# Patient Record
Sex: MALE | Race: WHITE | NOT HISPANIC OR LATINO | Employment: OTHER | URBAN - METROPOLITAN AREA
[De-identification: names, ages, dates, MRNs, and addresses within clinical notes are randomized per-mention and may not be internally consistent; named-entity substitution may affect disease eponyms.]

---

## 2017-04-18 ENCOUNTER — OFFICE VISIT (OUTPATIENT)
Dept: FAMILY MEDICINE CLINIC | Facility: CLINIC | Age: 67
End: 2017-04-18

## 2017-04-18 VITALS
SYSTOLIC BLOOD PRESSURE: 166 MMHG | DIASTOLIC BLOOD PRESSURE: 100 MMHG | HEART RATE: 68 BPM | BODY MASS INDEX: 24.14 KG/M2 | WEIGHT: 178 LBS | RESPIRATION RATE: 16 BRPM | TEMPERATURE: 97.8 F

## 2017-04-18 DIAGNOSIS — R05.9 COUGH: Primary | ICD-10-CM

## 2017-04-18 PROCEDURE — 99213 OFFICE O/P EST LOW 20 MIN: CPT | Performed by: FAMILY MEDICINE

## 2017-04-18 RX ORDER — PROMETHAZINE HYDROCHLORIDE AND CODEINE PHOSPHATE 6.25; 1 MG/5ML; MG/5ML
5 SYRUP ORAL EVERY 6 HOURS PRN
Qty: 118 ML | Refills: 0 | Status: SHIPPED | OUTPATIENT
Start: 2017-04-18 | End: 2018-08-31
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
## 2017-06-06 ENCOUNTER — TELEPHONE (OUTPATIENT)
Dept: FAMILY MEDICINE CLINIC | Facility: CLINIC | Age: 67
End: 2017-06-06

## 2017-06-06 RX ORDER — SILDENAFIL 100 MG/1
100 TABLET, FILM COATED ORAL DAILY PRN
Qty: 5 TABLET | Refills: 1 | Status: SHIPPED | OUTPATIENT
Start: 2017-06-06 | End: 2018-08-31

## 2017-06-06 NOTE — TELEPHONE ENCOUNTER
----- Message from Stevo Ledesma Rep sent at 6/6/2017  9:44 AM EDT -----  Regarding: med request  Contact: [phone]  Pt called and said thinks he wants the Viagra rx now    Going on a cruise next week and says needs that before then     [facility] [place]     Per Dr. Leyva, rx sent in for viagra 100 mg qty of 5.  BBmohsen, CMA

## 2017-06-29 RX ORDER — LEVOTHYROXINE SODIUM 0.03 MG/1
25 TABLET ORAL DAILY
Qty: 30 TABLET | Refills: 3 | Status: SHIPPED | OUTPATIENT
Start: 2017-06-29 | End: 2017-08-12 | Stop reason: SDUPTHER

## 2017-08-14 RX ORDER — LEVOTHYROXINE SODIUM 0.03 MG/1
TABLET ORAL
Qty: 30 TABLET | Refills: 3 | Status: SHIPPED | OUTPATIENT
Start: 2017-08-14 | End: 2018-03-03 | Stop reason: SDUPTHER

## 2018-03-04 RX ORDER — LEVOTHYROXINE SODIUM 0.03 MG/1
TABLET ORAL
Qty: 30 TABLET | Refills: 3 | Status: SHIPPED | OUTPATIENT
Start: 2018-03-04 | End: 2018-06-13 | Stop reason: SDUPTHER

## 2018-06-13 RX ORDER — LEVOTHYROXINE SODIUM 0.03 MG/1
TABLET ORAL
Qty: 90 TABLET | Refills: 0 | Status: SHIPPED | OUTPATIENT
Start: 2018-06-13 | End: 2018-08-31 | Stop reason: SDUPTHER

## 2018-08-30 RX ORDER — LEVOTHYROXINE SODIUM 0.03 MG/1
TABLET ORAL
Qty: 90 TABLET | Refills: 0 | OUTPATIENT
Start: 2018-08-30

## 2018-08-31 ENCOUNTER — OFFICE VISIT (OUTPATIENT)
Dept: FAMILY MEDICINE CLINIC | Facility: CLINIC | Age: 68
End: 2018-08-31

## 2018-08-31 VITALS
TEMPERATURE: 97.7 F | DIASTOLIC BLOOD PRESSURE: 70 MMHG | OXYGEN SATURATION: 92 % | WEIGHT: 184 LBS | RESPIRATION RATE: 16 BRPM | BODY MASS INDEX: 24.95 KG/M2 | HEART RATE: 72 BPM | SYSTOLIC BLOOD PRESSURE: 110 MMHG

## 2018-08-31 DIAGNOSIS — E03.9 HYPOTHYROIDISM, UNSPECIFIED TYPE: Primary | ICD-10-CM

## 2018-08-31 PROCEDURE — 84439 ASSAY OF FREE THYROXINE: CPT | Performed by: FAMILY MEDICINE

## 2018-08-31 PROCEDURE — 36415 COLL VENOUS BLD VENIPUNCTURE: CPT | Performed by: FAMILY MEDICINE

## 2018-08-31 PROCEDURE — 99213 OFFICE O/P EST LOW 20 MIN: CPT | Performed by: FAMILY MEDICINE

## 2018-08-31 PROCEDURE — 84443 ASSAY THYROID STIM HORMONE: CPT | Performed by: FAMILY MEDICINE

## 2018-08-31 PROCEDURE — 80048 BASIC METABOLIC PNL TOTAL CA: CPT | Performed by: FAMILY MEDICINE

## 2018-08-31 RX ORDER — LEVOTHYROXINE SODIUM 0.03 MG/1
25 TABLET ORAL DAILY
Qty: 90 TABLET | Refills: 2 | Status: SHIPPED | OUTPATIENT
Start: 2018-08-31 | End: 2019-06-21 | Stop reason: SDUPTHER

## 2019-04-17 ENCOUNTER — HOSPITAL ENCOUNTER (OUTPATIENT)
Dept: GENERAL RADIOLOGY | Facility: HOSPITAL | Age: 69
Discharge: HOME | End: 2019-04-17
Admitting: FAMILY MEDICINE

## 2019-04-17 ENCOUNTER — OFFICE VISIT (OUTPATIENT)
Dept: FAMILY MEDICINE CLINIC | Facility: CLINIC | Age: 69
End: 2019-04-17

## 2019-04-17 VITALS
OXYGEN SATURATION: 97 % | RESPIRATION RATE: 20 BRPM | DIASTOLIC BLOOD PRESSURE: 60 MMHG | HEART RATE: 85 BPM | BODY MASS INDEX: 23.19 KG/M2 | SYSTOLIC BLOOD PRESSURE: 90 MMHG | TEMPERATURE: 97.8 F | WEIGHT: 171 LBS

## 2019-04-17 DIAGNOSIS — J69.0 ASPIRATION PNEUMONIA DUE TO GASTRIC SECRETIONS, UNSPECIFIED LATERALITY, UNSPECIFIED PART OF LUNG (HCC): ICD-10-CM

## 2019-04-17 DIAGNOSIS — M25.552 LEFT HIP PAIN: ICD-10-CM

## 2019-04-17 DIAGNOSIS — M25.552 LEFT HIP PAIN: Primary | ICD-10-CM

## 2019-04-17 PROBLEM — J18.9 PNEUMONIA DUE TO INFECTIOUS ORGANISM: Status: ACTIVE | Noted: 2019-04-17

## 2019-04-17 PROCEDURE — 99214 OFFICE O/P EST MOD 30 MIN: CPT | Performed by: FAMILY MEDICINE

## 2019-04-17 PROCEDURE — 73502 X-RAY EXAM HIP UNI 2-3 VIEWS: CPT

## 2019-04-17 PROCEDURE — 71046 X-RAY EXAM CHEST 2 VIEWS: CPT

## 2019-04-17 RX ORDER — DICLOFENAC SODIUM 75 MG/1
75 TABLET, DELAYED RELEASE ORAL 2 TIMES DAILY
Qty: 14 TABLET | Refills: 0 | Status: SHIPPED | OUTPATIENT
Start: 2019-04-17 | End: 2019-04-25

## 2019-04-17 NOTE — PROGRESS NOTES
Subjective   Lucian Bermudez is a 69 y.o. male seen today for Hip Pain.     Hip Pain    The incident occurred more than 1 week ago (2 weeks ago). Incident location: playing golf. The injury mechanism was a twisting injury. The pain is present in the left hip. The quality of the pain is described as aching. The pain is at a severity of 8/10. Associated symptoms include an inability to bear weight. The symptoms are aggravated by weight bearing. He has tried non-weight bearing, rest, immobilization and NSAIDs for the symptoms. The treatment provided moderate relief.        The following portions of the patient's history were reviewed and updated as appropriate: allergies, current medications, past social history and problem list.    Review of Systems    Objective   BP 90/60   Pulse 85   Temp 97.8 °F (36.6 °C)   Resp 20   Wt 77.6 kg (171 lb)   SpO2 97%   BMI 23.19 kg/m²   Physical Exam   Constitutional: He is oriented to person, place, and time.   The patient is a thin middle-aged  male in no acute distress.  His voice is indistinct.  He has a history of prior surgery for throat cancer.   Pulmonary/Chest:   There are some mild bibasilar rhonchi in both lung fields.  There is no wheezing or actual rales.   Abdominal:   There is a ostomy for PEG tube.   Musculoskeletal:   The patient is in no acute distress.  He does have an antalgic gait.  There is some tenderness with deep compression at the posterior aspect of the left hip and in the area of the initial tuberosity.  There is no tenderness over the lateral aspect or anterior aspect of the left hip.  He does have good range of motion.  He has pain with weightbearing.   Neurological: He is alert and oriented to person, place, and time.       Assessment/Plan   Problem List Items Addressed This Visit        Respiratory    Pneumonia due to infectious organism      Other Visit Diagnoses     Left hip pain    -  Primary      The patient was recently hospitalized  for aspiration pneumonia in [place].  He stayed overnight.  That was about a month ago.  He says his symptoms have resolved including cough and fever.  He finished his course of antibiotics.  He did have bibasilar infiltrates on his chest x-ray in [place].    The patient has left hip pain following an injury that was sustained while playing golf 2 weeks ago.  He did hear a pop at that time.  He has pain with ambulation.  I believe we need to rule out a bony fracture.  We will check an AP and lateral x-ray of the left hip as well as of the pelvis.  We will try him on diclofenac 75 mg twice a day for a week.  If he fails to improve then will need to refer either to orthopedics or physical therapy.        Drink plenty fluids.    Check an xray of the left hip with pelvis. Check a chest xray. Report results by letter.    Rx for Diclofenac 75 mg twice a day #14+0.  Stay off the Aleve while on the Diclofenac.    Follow up in August for a 45 minute visit fasting for general lab studies.                  Scribed for Dr Neil Hui by Nayely Vila [facility].  \        I, Neil Hui MD, personally performed the services described in this documentation, as scribed by Nayely Vila in my presence, and is both accurate and complete.        (Please note that portions of this note were completed with a voice recognition program. Efforts were made to edit the dictations,but occasionally words are mis transcribed.)

## 2019-04-19 DIAGNOSIS — M25.552 LEFT HIP PAIN: Primary | ICD-10-CM

## 2019-04-25 ENCOUNTER — OFFICE VISIT (OUTPATIENT)
Dept: FAMILY MEDICINE CLINIC | Facility: CLINIC | Age: 69
End: 2019-04-25

## 2019-04-25 VITALS
HEART RATE: 76 BPM | OXYGEN SATURATION: 96 % | TEMPERATURE: 97.7 F | BODY MASS INDEX: 23.73 KG/M2 | DIASTOLIC BLOOD PRESSURE: 60 MMHG | SYSTOLIC BLOOD PRESSURE: 94 MMHG | WEIGHT: 175 LBS | RESPIRATION RATE: 16 BRPM

## 2019-04-25 DIAGNOSIS — M16.12 PRIMARY OSTEOARTHRITIS OF LEFT HIP: Primary | ICD-10-CM

## 2019-04-25 PROCEDURE — 20552 NJX 1/MLT TRIGGER POINT 1/2: CPT | Performed by: FAMILY MEDICINE

## 2019-04-25 PROCEDURE — 99213 OFFICE O/P EST LOW 20 MIN: CPT | Performed by: FAMILY MEDICINE

## 2019-04-25 RX ORDER — TRAMADOL HYDROCHLORIDE 50 MG/1
50 TABLET ORAL EVERY 6 HOURS PRN
Qty: 25 TABLET | Refills: 0
Start: 2019-04-25 | End: 2019-05-08

## 2019-04-25 RX ORDER — METHYLPREDNISOLONE ACETATE 80 MG/ML
80 INJECTION, SUSPENSION INTRA-ARTICULAR; INTRALESIONAL; INTRAMUSCULAR; SOFT TISSUE ONCE
Status: COMPLETED | OUTPATIENT
Start: 2019-04-25 | End: 2019-04-25

## 2019-04-25 RX ADMIN — METHYLPREDNISOLONE ACETATE 80 MG: 80 INJECTION, SUSPENSION INTRA-ARTICULAR; INTRALESIONAL; INTRAMUSCULAR; SOFT TISSUE at 15:43

## 2019-04-25 NOTE — PROGRESS NOTES
Subjective   Lucian Bermudez is a 69 y.o. male seen today for Hip Pain.     Hip Pain    The incident occurred more than 1 week ago (about 3 -4 weeks.). Incident location: playing golf. The injury mechanism was a twisting injury. The pain is present in the left hip. The pain is at a severity of 8/10. The pain is moderate. The pain has been worsening since onset. The symptoms are aggravated by movement. He has tried NSAIDs for the symptoms. The treatment provided no relief.        The following portions of the patient's history were reviewed and updated as appropriate: allergies, current medications, past social history and problem list.    Review of Systems   Respiratory: Negative for shortness of breath.    Cardiovascular: Negative for chest pain.       Objective   BP 94/60   Pulse 76   Temp 97.7 °F (36.5 °C)   Resp 16   Wt 79.4 kg (175 lb)   SpO2 96%   BMI 23.73 kg/m²   Physical Exam   Constitutional: He appears well-developed and well-nourished.   Nursing note and vitals reviewed.      Assessment/Plan   Problem List Items Addressed This Visit     None      Visit Diagnoses     Primary osteoarthritis of left hip    -  Primary    Relevant Medications    methylPREDNISolone acetate (DEPO-medrol) injection 80 mg (Completed)    traMADol (ULTRAM) 50 MG tablet    Other Relevant Orders    Inject Trigger Point, 1 or 2        Inject Trigger Point, 1 or 2  Date/Time: 4/25/2019 2:58 PM  Performed by: Neil Hui MD  Authorized by: Neil Hui MD   Consent: Verbal consent obtained.  Consent given by: patient  Patient understanding: patient states understanding of the procedure being performed  Patient consent: the patient's understanding of the procedure matches consent given  Patient identity confirmed: verbally with patient  Local anesthesia used: yes    Anesthesia:  Local anesthesia used: yes  Local Anesthetic: bupivacaine 0.25% without epinephrine  Anesthetic total: 0.2 mL    Sedation:  Patient sedated:  no    Patient tolerance: Patient tolerated the procedure well with no immediate complications  Comments: Posterior aspect of left hip injected depo medrol 80 mg and 0.25% Marcaine without epinephrine.            Drink plenty fluids.    Continue medications as doing.    Written Rx for Tramadol 50 mg every 6 hours if needed #25+0.  Navin# [id number] reviewed.    Refer to physical therapy.    Follow up as needed.                Scribed for Dr Neil Hui by Nayely Vila CMA.          I, Neil Hui MD, personally performed the services described in this documentation, as scribed by Nayely Vila in my presence, and is both accurate and complete.        (Please note that portions of this note were completed with a voice recognition program. Efforts were made to edit the dictations,but occasionally words are mis transcribed.)

## 2019-05-01 ENCOUNTER — HOSPITAL ENCOUNTER (OUTPATIENT)
Dept: PHYSICAL THERAPY | Facility: HOSPITAL | Age: 69
Setting detail: THERAPIES SERIES
Discharge: HOME | End: 2019-05-01

## 2019-05-01 ENCOUNTER — TELEPHONE (OUTPATIENT)
Dept: FAMILY MEDICINE CLINIC | Facility: CLINIC | Age: 69
End: 2019-05-01

## 2019-05-01 DIAGNOSIS — M25.552 LEFT HIP PAIN: Primary | ICD-10-CM

## 2019-05-01 PROCEDURE — 97161 PT EVAL LOW COMPLEX 20 MIN: CPT | Performed by: PHYSICAL THERAPIST

## 2019-05-01 NOTE — TELEPHONE ENCOUNTER
----- Message from Stevo Evans Rep sent at 5/1/2019  9:12 AM EDT -----  Regarding: [facility] REFERRAL  Contact: [phone]  PT SAID THE PHYSICAL THERAPY IS NOT HELPING MUCH FOR HIS HIP AND WOULD LIKE TO KNOW IF HE CAN BE REFERRED TO ORTHO?      Referral has been placed and appt has been made. Nichole, CMA

## 2019-05-08 ENCOUNTER — OFFICE VISIT (OUTPATIENT)
Dept: ORTHOPEDIC SURGERY | Facility: CLINIC | Age: 69
End: 2019-05-08

## 2019-05-08 VITALS — HEART RATE: 77 BPM | HEIGHT: 72 IN | BODY MASS INDEX: 23.71 KG/M2 | OXYGEN SATURATION: 96 % | WEIGHT: 175.04 LBS

## 2019-05-08 DIAGNOSIS — S76.012A HIP STRAIN, LEFT, INITIAL ENCOUNTER: Primary | ICD-10-CM

## 2019-05-08 PROCEDURE — 99203 OFFICE O/P NEW LOW 30 MIN: CPT | Performed by: ORTHOPAEDIC SURGERY

## 2019-05-08 NOTE — PROGRESS NOTES
[facility] [place] [facility]    Subjective     Chief Complaint   Patient presents with   • Left Hip - Pain     Patient says he was playing golf while in [place], around five weeks ago. Patient felt a pain then, but it was gotten worse. Saw PCP who did Xray. Cortisone injection given around two weeks ago.        HPI      Lucian Bermudez is a 69 y.o. male.  He injured his left hip playing golf in 4 to 5 weeks ago.  He felt a pop in his posterior hip buttock area.  Pain is 3-8 out of 10 at times.  He went to physical therapy one day.  It is throbbing stabbing and unchanged.        Past Medical History:   Diagnosis Date   • Cancer (CMS/HCC)    • H/O colonoscopy       Past Surgical History:   Procedure Laterality Date   • CATARACT EXTRACTION     • COLONOSCOPY        History reviewed. No pertinent family history.  Social History     Socioeconomic History   • Marital status:      Spouse name: Not on file   • Number of children: Not on file   • Years of education: Not on file   • Highest education level: Not on file   Tobacco Use   • Smoking status: Never Smoker   • Smokeless tobacco: Never Used      Current Outpatient Medications on File Prior to Visit   Medication Sig Dispense Refill   • levothyroxine (SYNTHROID, LEVOTHROID) 25 MCG tablet Take 1 tablet by mouth Daily. 90 tablet 2   • [DISCONTINUED] traMADol (ULTRAM) 50 MG tablet Take 1 tablet by mouth Every 6 (Six) Hours As Needed for Moderate Pain . 25 tablet 0     No current facility-administered medications on file prior to visit.       No Known Allergies     The following portions of the patient's history were reviewed and updated as appropriate: allergies, current medications, past family history, past medical history, past social history, past surgical history and problem list.    Review of Systems   Constitutional: Positive for activity change.   HENT: Negative.    Eyes: Negative.    Respiratory: Negative.    Cardiovascular: Negative.   "  Gastrointestinal: Negative.    Endocrine: Negative.    Genitourinary: Negative.    Musculoskeletal: Positive for arthralgias and gait problem.   Skin: Negative.    Allergic/Immunologic: Negative.    Hematological: Negative.    Psychiatric/Behavioral: Negative.         Objective      Physical Exam  Pulse 77   Ht 182.9 cm (72\")   Wt 79.4 kg (175 lb 0.7 oz)   SpO2 96%   BMI 23.74 kg/m²     Body mass index is 23.74 kg/m².        GENERAL APPEARANCE: awake, alert & oriented x 3, in no acute distress and well developed, well nourished  PSYCH: normal mood and affect  LUNGS:  breathing nonlabored, no wheezing  EYES: sclera anicteric, pupils equal  CARDIOVASCULAR: palpable pulses dorsalis pedis, palpable posterior tibial bilaterally. Capillary refill less than 2 seconds  INTEGUMENTARY: skin intact, no clubbing, cyanosis  NEUROLOGIC:  Normal Sensation and reflexes             Ortho Exam  Peripheral Vascular  Lower Extremity   Edema - None bilaterally    Musculoskeletal  Lower Extremity   Left Hip    Normal sensation and coordination    No crepitus, instability, subluxation or laxity    No known fractures or dislocations   Right Hip    Normal sensation and coordination    No crepitus, instability, subluxation or laxity    No known fractures or dislocations     Inspection and palpation    Tenderness -      Right - none     Left - none    Tissue tension/texture is pliable and soft bilaterally     Normal warmth bilaterally   Strength and Tone    Left hip flexors - 5/5     Right hip flexors - 5/5   Deformities/Postures/Misalignments/Discrepancies    No leg length discrepancy   Functional Testing    Stinchfield test negative bilaterally    90-90 straight leg raise negative bilaterally    Imaging/Studies  Imaging Results (last 7 days)     ** No results found for the last 168 hours. **      I viewed his x-rays from April 17 which are negative    Assessment/Plan        ICD-10-CM ICD-9-CM   1. Hip strain, left, initial encounter " S76.012A 843.9       Orders Placed This Encounter   Procedures   • Ambulatory Referral to Physical Therapy      He will go to physical therapy and follow-up in 4 weeks.  He should get better for the soft tissue injury within a few months.    Medical Decision Making  Management Options : over-the-counter medicine and physical/occupational therapy  Data/Risk: radiology tests and independent visualization of imaging, lab tests, or EMG/NCV    Neil Hyman MD  05/08/19  10:07 AM         EMR Dragon/Transcription disclaimer:  Much of this encounter note is an electronic transcription of spoken language to printed text. Electronic transcription of spoken language may permit erroneous, or at times, nonsensical words or phrases to be inadvertently transcribed. Although I have reviewed the note for such errors, some may still exist.

## 2019-05-09 ENCOUNTER — HOSPITAL ENCOUNTER (OUTPATIENT)
Dept: PHYSICAL THERAPY | Facility: HOSPITAL | Age: 69
Setting detail: THERAPIES SERIES
Discharge: HOME | End: 2019-05-09

## 2019-05-09 DIAGNOSIS — M25.552 LEFT HIP PAIN: Primary | ICD-10-CM

## 2019-05-09 PROCEDURE — 97110 THERAPEUTIC EXERCISES: CPT | Performed by: PHYSICAL THERAPIST

## 2019-05-09 NOTE — THERAPY TREATMENT NOTE
Outpatient Physical Therapy Ortho Treatment Note  [facility]     Patient Name: Lucian Bermudez  : 1950  MRN: [id number]  Today's Date: 2019      Visit Date: 2019    Visit Dx:    ICD-10-CM ICD-9-CM   1. Left hip pain M25.552 719.45       Patient Active Problem List   Diagnosis   • Hypothyroidism   • Pneumonia due to infectious organism        Past Medical History:   Diagnosis Date   • Cancer (CMS/HCC)    • H/O colonoscopy         Past Surgical History:   Procedure Laterality Date   • CATARACT EXTRACTION     • COLONOSCOPY         PT Ortho     Row Name 19 1100       Subjective Comments    Subjective Comments  Pt. saw ortho MD and is assured that he does not need surgery on his hip.  He has done his HEP, some exercises more than others.  He reports no pain when he is not on his feet.  He continues to have hip pain with walking and distal LE symptoms with prolonged walking.  -ANDRÉS      User Key  (r) = Recorded By, (t) = Taken By, (c) = Cosigned By    Initials Name Provider Type    Louann Batista, FRANSISCO Physical Therapist                      PT Assessment/Plan     Row Name 19 1100          PT Assessment    Assessment Comments  Possible facet restriction with intermittent nerve compression/irritation.  Positions of flexion and contralateral sidebending provide relief while extension and ipsilateral sidebend increase symptoms.  This would suggest lumbar origin of symptoms.  -ANDRÉS        PT Plan    PT Plan Comments  Continue PT.  Progress and modify as symptoms indicate.  -ADNRÉS       User Key  (r) = Recorded By, (t) = Taken By, (c) = Cosigned By    Initials Name Provider Type    Louann Batista PT Physical Therapist            Exercises     Row Name 19 1100             Subjective Comments    Subjective Comments  Pt. saw ortho MD and is assured that he does not need surgery on his hip.  He has done his HEP, some exercises more than others.  He reports no pain when he is not on  his feet.  He continues to have hip pain with walking and distal LE symptoms with prolonged walking.  -ANDRÉS         Total Minutes    05048 - PT Therapeutic Exercise Minutes  35  -ANDRÉS         Exercise 1    Exercise Name 1  Reviewed existing HEP and practiced new exercises as a part of ongoing assessment of symptoms.  Added lumbar right rotation/L posterior lateral hip stretch, sidelying IT band stretch off edge of bed, and abdominal exercises to HEP after practice in clinic today.  -ANDRÉS        User Key  (r) = Recorded By, (t) = Taken By, (c) = Cosigned By    Initials Name Provider Type    Louann Batista, PT Physical Therapist                                          Time Calculation:   Start Time: 1115  Therapy Charges for Today     Code Description Service Date Service Provider Modifiers Qty    [id number]  PT THER PROC EA 15 MIN 5/9/2019 Louann Machado, PT GP 2                    Louann Machado, PT  5/9/2019

## 2019-05-22 ENCOUNTER — HOSPITAL ENCOUNTER (OUTPATIENT)
Dept: PHYSICAL THERAPY | Facility: HOSPITAL | Age: 69
Setting detail: THERAPIES SERIES
Discharge: HOME | End: 2019-05-22

## 2019-05-22 DIAGNOSIS — M25.552 LEFT HIP PAIN: Primary | ICD-10-CM

## 2019-05-22 PROCEDURE — 97110 THERAPEUTIC EXERCISES: CPT | Performed by: PHYSICAL THERAPIST

## 2019-05-22 NOTE — THERAPY TREATMENT NOTE
Outpatient Physical Therapy Ortho Treatment Note  [facility]     Patient Name: Lucian Bermudez  : 1950  MRN: [id number]  Today's Date: 2019      Visit Date: 2019    Visit Dx:    ICD-10-CM ICD-9-CM   1. Left hip pain M25.552 719.45       Patient Active Problem List   Diagnosis   • Hypothyroidism   • Pneumonia due to infectious organism        Past Medical History:   Diagnosis Date   • Cancer (CMS/HCC)    • H/O colonoscopy         Past Surgical History:   Procedure Laterality Date   • CATARACT EXTRACTION     • COLONOSCOPY         PT Ortho     Row Name 19 1600       Subjective Comments    Subjective Comments  Pt. continues to report LLE pain with standing and walking.  HE has begun seeing a chiropractor.  He thinks the treatments help his symptoms at least temporarily.  He continues to do his home exercises and they do not hurt, but he is not sure that they help.  He wants to know what he should do.  -ANDRÉS      User Key  (r) = Recorded By, (t) = Taken By, (c) = Cosigned By    Initials Name Provider Type    Louann Batista, PT Physical Therapist                      PT Assessment/Plan     Row Name 19 1600          PT Assessment    Assessment Comments  No significant change in symptom presentation after 2 weeks of HEP performance.  Pt. seems to want to try chiropractic care.    -ANDRÉS        PT Plan    PT Plan Comments  PT follow up scheduled in 4 weeks.  Pt. will explore chiropractic treatment per his request and will return for reassessment if symptoms have not resolved or improved in 4 weeks.  -ANDRÉS       User Key  (r) = Recorded By, (t) = Taken By, (c) = Cosigned By    Initials Name Provider Type    Louann Batista PT Physical Therapist            Exercises     Row Name 19 1600             Subjective Comments    Subjective Comments  Pt. continues to report LLE pain with standing and walking.  HE has begun seeing a chiropractor.  He thinks the treatments help his  symptoms at least temporarily.  He continues to do his home exercises and they do not hurt, but he is not sure that they help.  He wants to know what he should do.  -ANDRÉS         Total Minutes    29003 - PT Therapeutic Exercise Minutes  25  -ANDRÉS         Exercise 1    Exercise Name 1  Mobility assessment in a variety of positions continue to indicate symptoms reduction in flexion and symptom exacerbation in extension or with standing or walking.  -ANDRÉS        User Key  (r) = Recorded By, (t) = Taken By, (c) = Cosigned By    Initials Name Provider Type    Louann Batista, PT Physical Therapist                                          Time Calculation:   Start Time: 1430  Total Timed Code Minutes- PT: 25 minute(s)  Therapy Charges for Today     Code Description Service Date Service Provider Modifiers Qty    81435975382  PT THER PROC EA 15 MIN 5/22/2019 Louann Machado, PT GP 2                    Louann Machado, PT  5/22/2019

## 2019-05-30 ENCOUNTER — APPOINTMENT (OUTPATIENT)
Dept: PHYSICAL THERAPY | Facility: HOSPITAL | Age: 69
End: 2019-05-30

## 2019-06-04 ENCOUNTER — APPOINTMENT (OUTPATIENT)
Dept: PHYSICAL THERAPY | Facility: HOSPITAL | Age: 69
End: 2019-06-04

## 2019-06-05 ENCOUNTER — OFFICE VISIT (OUTPATIENT)
Dept: ORTHOPEDIC SURGERY | Facility: CLINIC | Age: 69
End: 2019-06-05

## 2019-06-05 VITALS — HEART RATE: 83 BPM | OXYGEN SATURATION: 98 % | WEIGHT: 175.04 LBS | BODY MASS INDEX: 23.71 KG/M2 | HEIGHT: 72 IN

## 2019-06-05 DIAGNOSIS — S76.012A HIP STRAIN, LEFT, INITIAL ENCOUNTER: ICD-10-CM

## 2019-06-05 DIAGNOSIS — M51.36 LUMBAR DEGENERATIVE DISC DISEASE: ICD-10-CM

## 2019-06-05 DIAGNOSIS — M54.5 LOW BACK PAIN, UNSPECIFIED BACK PAIN LATERALITY, UNSPECIFIED CHRONICITY, WITH SCIATICA PRESENCE UNSPECIFIED: Primary | ICD-10-CM

## 2019-06-05 PROCEDURE — 99213 OFFICE O/P EST LOW 20 MIN: CPT | Performed by: ORTHOPAEDIC SURGERY

## 2019-06-05 NOTE — PROGRESS NOTES
[facility] [place] [facility]    Subjective     Chief Complaint   Patient presents with   • Follow-up     4 weeks - Left Hip Strain        HPI      Lucian Bermudez is a 69 y.o. male.  His pain has continued for 2 and half months.  It radiates from his hip down towards his foot.  Is worse with walking standing climbing stairs and better with sitting.  He is tried physical therapy and a chiropractor.  Pain is 8 out of 10 at its worse.  He has minimal back pain.  He has a history of thyroid cancer.        Past Medical History:   Diagnosis Date   • Cancer (CMS/HCC)    • H/O colonoscopy       Past Surgical History:   Procedure Laterality Date   • CATARACT EXTRACTION     • COLONOSCOPY        History reviewed. No pertinent family history.  Social History     Socioeconomic History   • Marital status:      Spouse name: Not on file   • Number of children: Not on file   • Years of education: Not on file   • Highest education level: Not on file   Tobacco Use   • Smoking status: Never Smoker   • Smokeless tobacco: Never Used      Current Outpatient Medications on File Prior to Visit   Medication Sig Dispense Refill   • levothyroxine (SYNTHROID, LEVOTHROID) 25 MCG tablet Take 1 tablet by mouth Daily. 90 tablet 2     No current facility-administered medications on file prior to visit.       No Known Allergies     The following portions of the patient's history were reviewed and updated as appropriate: allergies, current medications, past family history, past medical history, past social history, past surgical history and problem list.    Review of Systems   Constitutional: Negative.    HENT: Negative.    Eyes: Negative.    Respiratory: Negative.    Cardiovascular: Negative.    Gastrointestinal: Negative.    Endocrine: Negative.    Genitourinary: Negative.    Musculoskeletal: Positive for arthralgias.   Skin: Negative.    Allergic/Immunologic: Negative.    Hematological: Negative.    Psychiatric/Behavioral:  "Negative.         Objective      Physical Exam  Pulse 83   Ht 182.9 cm (72.01\")   Wt 79.4 kg (175 lb 0.7 oz)   SpO2 98%   BMI 23.74 kg/m²     Body mass index is 23.74 kg/m².        GENERAL APPEARANCE: awake, alert & oriented x 3, in no acute distress and well developed, well nourished  PSYCH: normal mood and affect      Ortho Exam  Peripheral Vascular   Upper Extremities    No cyanotic nail beds    Pink nail beds and rapid capillary refill   Palpation    Radial Pulse - Bilaterally normal    Neurologic   Sensory - normal   Muscle Strength and Tone    Right iliopsoas - 5/5    Left iliopsoas - 5/5    Right quadriceps - 5/5     Left quadriceps - 5/5    Right hamstrings - 5/5     Left hamstrings - 5/5    Right tibialis anterior - 5/5    Left tibialis anterior - 5/5    Right gastroc-soleus - 5/5    Left gastroc-soleus - 5/5    Right EHL - 5/5    Left EHL 5/5     Reflexes    Right knee- 2+     Left knee - 2+     Right ankle - 2+    Left ankle - 2+    Musculoskeletal   Lumbosacral Spine    Inspection and Palpation - no swelling   ROJM    Flexion - 70 degrees   Deformities/Malalignments/Discrepancies  - lumbar lordosis   Functional Testing    Straight Leg Raising Test negative     Hips   Right Hip - no tenderness to palpation, no pain    Left Hip - no tenderness to palpation, no pain           Imaging/Studies  Imaging Results (last 7 days)     Procedure Component Value Units Date/Time    XR Spine Lumbar 2 or 3 View [[id number]] Resulted:  06/05/19 0949     Updated:  06/05/19 0950    Narrative:       Lumbar Spine X-Ray  Indication: Pain  AP and Lateral views    Findings: Degenerative changes  No fracture  No bony lesion  Normal soft tissues    No prior studies were available for comparison.            Assessment/Plan        ICD-10-CM ICD-9-CM   1. Low back pain, unspecified back pain laterality, unspecified chronicity, with sciatica presence unspecified M54.5 724.2   2. Hip strain, left, initial encounter S76.012A 843.9 "   3. Lumbar degenerative disc disease M51.36 722.52       Orders Placed This Encounter   Procedures   • XR Spine Lumbar 2 or 3 View   • MRI Lumbar Spine Without Contrast      I have ordered an MRI of his lumbar spine.  Not only has he had sciatica radiculopathy he has had a history of cancer.  I am concerned about that and the possibility of metastases.  I will see him back after the MRI of the lumbar spine.  He may hold off on therapy in the meantime.    Medical Decision Making  Management Options : over-the-counter medicine  Data/Risk: radiology tests and independent visualization of imaging, lab tests, or EMG/NCV    Neil Hyman MD  06/05/19  9:52 AM         EMR Dragon/Transcription disclaimer:  Much of this encounter note is an electronic transcription of spoken language to printed text. Electronic transcription of spoken language may permit erroneous, or at times, nonsensical words or phrases to be inadvertently transcribed. Although I have reviewed the note for such errors, some may still exist.

## 2019-06-18 ENCOUNTER — HOSPITAL ENCOUNTER (OUTPATIENT)
Dept: MRI IMAGING | Facility: HOSPITAL | Age: 69
Discharge: HOME | End: 2019-06-18
Admitting: ORTHOPAEDIC SURGERY

## 2019-06-18 DIAGNOSIS — M51.36 LUMBAR DEGENERATIVE DISC DISEASE: ICD-10-CM

## 2019-06-18 PROCEDURE — 72148 MRI LUMBAR SPINE W/O DYE: CPT

## 2019-06-19 ENCOUNTER — OFFICE VISIT (OUTPATIENT)
Dept: ORTHOPEDIC SURGERY | Facility: CLINIC | Age: 69
End: 2019-06-19

## 2019-06-19 ENCOUNTER — HOSPITAL ENCOUNTER (OUTPATIENT)
Dept: PHYSICAL THERAPY | Facility: HOSPITAL | Age: 69
Setting detail: THERAPIES SERIES
Discharge: HOME | End: 2019-06-19

## 2019-06-19 VITALS — HEART RATE: 88 BPM | WEIGHT: 175.04 LBS | BODY MASS INDEX: 23.71 KG/M2 | OXYGEN SATURATION: 98 % | HEIGHT: 72 IN

## 2019-06-19 DIAGNOSIS — M51.36 DDD (DEGENERATIVE DISC DISEASE), LUMBAR: Primary | ICD-10-CM

## 2019-06-19 DIAGNOSIS — M54.16 RADICULOPATHY OF LUMBAR REGION: ICD-10-CM

## 2019-06-19 DIAGNOSIS — M25.552 LEFT HIP PAIN: Primary | ICD-10-CM

## 2019-06-19 PROCEDURE — 99213 OFFICE O/P EST LOW 20 MIN: CPT | Performed by: ORTHOPAEDIC SURGERY

## 2019-06-19 PROCEDURE — 97110 THERAPEUTIC EXERCISES: CPT | Performed by: PHYSICAL THERAPIST

## 2019-06-19 NOTE — PROGRESS NOTES
[facility]opaedic Surgery Clinic Note    Subjective     Chief Complaint   Patient presents with   • Follow-up     after MRI L spine 06/18/2019        HPI  Lucian Bermudez is a 69 y.o. male.  His follow-up MRI lumbar spine.  Now his whole left leg is hurting.  He is here with his wife.  Pain is 3-8 out of 10 radiating down his left leg.  It is dull.  Is getting worse.  He has no true back pain today.  He has normal bowel bladder function.    Past Medical History:   Diagnosis Date   • Cancer (CMS/HCC)    • H/O colonoscopy       Past Surgical History:   Procedure Laterality Date   • CATARACT EXTRACTION     • COLONOSCOPY        History reviewed. No pertinent family history.  Social History     Socioeconomic History   • Marital status:      Spouse name: Not on file   • Number of children: Not on file   • Years of education: Not on file   • Highest education level: Not on file   Tobacco Use   • Smoking status: Never Smoker   • Smokeless tobacco: Never Used      Current Outpatient Medications on File Prior to Visit   Medication Sig Dispense Refill   • levothyroxine (SYNTHROID, LEVOTHROID) 25 MCG tablet Take 1 tablet by mouth Daily. 90 tablet 2     No current facility-administered medications on file prior to visit.       No Known Allergies     The following portions of the patient's history were reviewed and updated as appropriate: allergies, current medications, past family history, past medical history, past social history, past surgical history and problem list.    Review of Systems   Constitutional: Negative.    HENT: Negative.    Eyes: Negative.    Respiratory: Negative.    Cardiovascular: Negative.    Gastrointestinal: Negative.    Endocrine: Negative.    Genitourinary: Negative.    Musculoskeletal: Positive for arthralgias.   Skin: Negative.    Allergic/Immunologic: Negative.    Neurological: Negative.    Hematological: Negative.    Psychiatric/Behavioral: Negative.         Objective      Physical  "Exam  Pulse 88   Ht 182.9 cm (72.01\")   Wt 79.4 kg (175 lb 0.7 oz)   SpO2 98%   BMI 23.74 kg/m²     Body mass index is 23.74 kg/m².    GENERAL APPEARANCE: awake, alert & oriented x 3, in no acute distress and well developed, well nourished  PSYCH: normal mood and affect      Ortho Exam  Peripheral Vascular   Upper Extremities    No cyanotic nail beds    Pink nail beds and rapid capillary refill   Palpation    Radial Pulse - Bilaterally normal    Neurologic   Sensory - normal   Muscle Strength and Tone    Right iliopsoas - 5/5    Left iliopsoas - 5/5    Right quadriceps - 5/5     Left quadriceps - 5/5    Right hamstrings - 5/5     Left hamstrings - 5/5    Right tibialis anterior - 5/5    Left tibialis anterior - 5/5    Right gastroc-soleus - 5/5    Left gastroc-soleus - 5/5    Right EHL - 5/5    Left EHL 5/5     Reflexes    Right knee- 2+     Left knee - 2+     Right ankle - 2+    Left ankle - 2+    Musculoskeletal   Lumbosacral Spine    Inspection and Palpation - no swelling   ROJM    Flexion - 70 degrees   Deformities/Malalignments/Discrepancies  - lumbar lordosis   Functional Testing    Straight Leg Raising Test negative     Hips   Right Hip - no tenderness to palpation, no pain    Left Hip - no tenderness to palpation, no pain           Imaging/Studies  Imaging Results (last 7 days)     ** No results found for the last 168 hours. **        I viewed his MRI of the lumbar spine from June 18 which shows an L4-5 disc protrusion and degenerative changes throughout the lumbar spine  Assessment/Plan        ICD-10-CM ICD-9-CM   1. DDD (degenerative disc disease), lumbar M51.36 722.52   2. Radiculopathy of lumbar region M54.16 724.4       Orders Placed This Encounter   Procedures   • Ambulatory Referral to Spine Surgery      I referred him to spine.  Patient and his family would like to see Dr. Kevin Swann.  I will see him back as needed.  He will continue physical therapy.    Medical Decision Making  Management " Options : over-the-counter medicine and physical/occupational therapy  Data/Risk: radiology tests and independent visualization of imaging, lab tests, or EMG/NCV    Neil Hyman MD  06/19/19  10:11 AM         EMR Dragon/Transcription disclaimer:  Much of this encounter note is an electronic transcription of spoken language to printed text. Electronic transcription of spoken language may permit erroneous, or at times, nonsensical words or phrases to be inadvertently transcribed. Although I have reviewed the note for such errors, some may still exist.

## 2019-06-19 NOTE — THERAPY PROGRESS REPORT/RE-CERT
Outpatient Physical Therapy Ortho Progress Note   [facility]     Patient Name: Lucian Bermudez  : 1950  MRN: [id number]  Today's Date: 2019      Visit Date: 2019    Visit Dx:    ICD-10-CM ICD-9-CM   1. Left hip pain M25.552 719.45       Patient Active Problem List   Diagnosis   • Hypothyroidism   • Pneumonia due to infectious organism        Past Medical History:   Diagnosis Date   • Cancer (CMS/HCC)    • H/O colonoscopy         Past Surgical History:   Procedure Laterality Date   • CATARACT EXTRACTION     • COLONOSCOPY         PT Ortho     Row Name 19 1600       Subjective Comments    Subjective Comments  Pt. followed up with ortho MD after MRI.  He has been referred to neurosurgeon.  He is to continue PT.  -ANDRÉS       DTR- Lower Quarter Clearing    Patellar tendon (L2-4)  Bilateral:;2- Normal response  -ANDRÉS    Achilles tendon (S1-2)  Bilateral:;2- Normal response  -ANDRÉS       Sensory Screen for Light Touch- Lower Quarter Clearing    L1 (inguinal area)  Intact  -ANDRÉS    L2 (anterior mid thigh)  Intact  -ANDRÉS    L3 (distal anterior thigh)  Intact  -ANDRÉS    L4 (medial lower leg/foot)  Intact  -ANDRÉS    L5 (lateral lower leg/great toe)  Intact  -ANDRÉS    S1 (bottom of foot)  Intact  -ANDRÉS       Myotomal Screen- Lower Quarter Clearing    Hip flexion (L2)  Bilateral:;5 (Normal)  -ANDRÉS    Knee extension (L3)  Bilateral:;5 (Normal)  -ANDRÉS    Ankle DF (L4)  Bilateral:;5 (Normal)  -ANDRÉS    Great toe extension (L5)  Bilateral:;5 (Normal)  -ANDRÉS    Ankle PF (S1)  Bilateral:;5 (Normal)  -ANDRÉS    Knee flexion (S2)  Bilateral:;5 (Normal)  -ANDRÉS       Lumbar ROM Screen- Lower Quarter Clearing    Lumbar Flexion  Normal  -ANDRÉS    Lumbar Extension  Impaired limited ROM and increases LE pain  -ANDRÉS    Lumbar Lateral Flexion  Impaired To L increases L pain, to R reduces L LE pain.  -ANDRÉS      User Key  (r) = Recorded By, (t) = Taken By, (c) = Cosigned By    Initials Name Provider Type    Louann Batista PT Physical Therapist                       PT Assessment/Plan     Row Name 06/19/19 1643 06/19/19 1631       PT Assessment    Functional Limitations  --  Impaired gait;Impaired locomotion;Performance in work activities;Performance in sport activities;Performance in leisure activities  -    Impairments  --  Poor body mechanics;Posture;Muscle strength;Range of motion;Pain;Joint mobility  -    Assessment Comments  --  Pt. was last seen nearly 1 month ago and reports no improvement in symptoms.  Today is his fourth visit since May 1, 2019.  He is here to resume care after recent MRI and MD follow up.  MRI indicates lateral disc protrusion and facet DJD producing L foraminal narrowing.  Best pain relief in clinic today was in prone over pillow .He may benefit from PT intervention if he is able to consistently comply with recommendations for symptoms management.    -ANDRÉS    Please refer to paper survey for additional self-reported information  Yes  -ANDRÉS  --    Rehab Potential  Fair  -ANDRÉS  --    Patient/caregiver participated in establishment of treatment plan and goals  Yes  -ANDRÉS  --    Patient would benefit from skilled therapy intervention  Yes  -ANDRÉS  --       PT Plan    PT Frequency  2x/week  -ANDRÉS  --    Predicted Duration of Therapy Intervention (Therapy Eval)  8 visits  -ANDRÉS  --    Planned CPT's?  PT EVAL LOW COMPLEXITY: 81254;PT THER PROC EA 15 MIN: 61800;PT MANUAL THERAPY EA 15 MIN: 81578;PT NEUROMUSC RE-EDUCATION EA 15 MIN: 17481;PT HOT OR COLD PACK TREAT MCARE  -ANDRÉS  --    PT Plan Comments  --  Resume PT when pt. returns from vacation.  -ANDRÉS      User Key  (r) = Recorded By, (t) = Taken By, (c) = Cosigned By    Initials Name Provider Type    Louann Batista, PT Physical Therapist            Exercises     Row Name 06/19/19 1600             Subjective Comments    Subjective Comments  Pt. followed up with ortho MD after MRI.  He has been referred to neurosurgeon.  He is to continue PT.  -         Total Minutes    26950 - PT Therapeutic  Exercise Minutes  45  -ANDRÉS         Exercise 1    Exercise Name 1  Reassessment completed today.  Pt. indicates no improvement with current HEP.  He continues to complain of LLE pain in hip nad anterior thigh  and front of leg to ankle.  He is leaving for [place] later this week and will be gone until after July 4.  Best pain relief achieved today laying prone over pillow.  -ANDRÉS        User Key  (r) = Recorded By, (t) = Taken By, (c) = Cosigned By    Initials Name Provider Type    Louann Batista PT Physical Therapist                       PT OP Goals     Row Name 06/19/19 1600          PT Short Term Goals    STG Date to Achieve  07/17/19  -ANDRÉS     STG 1  client will be independent with initial HEP program  -ANDRÉS     STG 1 Progress  Ongoing  -ANDRÉS     STG 2  cleint will complete ADLs without pain  -ANDRÉS     STG 2 Progress  Ongoing  -ANDRÉS     STG 3  client will demonstrate left hip strength 4+/5  -ANDRÉS     STG 3 Progress  Ongoing  -ANDRÉS     STG 4  Pt. reports reduction in LLE pain intensity and distribution.  -ANDRÉS        Long Term Goals    LTG Date to Achieve  08/14/19  -ANDRÉS     LTG 1  client will be independent with comprehensive program  -ANDRÉS     LTG 1 Progress  Ongoing  -ANDRÉS     LTG 2  client will demonstrate normalized gait mechanics without pain   -ANDRÉS     LTG 2 Progress  Ongoing  -ANDRÉS     LTG 3  client will report LEFS increased to > 45/80  -ANDRÉS     LTG 3 Progress  Progressing;Ongoing  -ANDRÉS        Time Calculation    PT Goal Re-Cert Due Date  09/17/19  -ANDRÉS       User Key  (r) = Recorded By, (t) = Taken By, (c) = Cosigned By    Initials Name Provider Type    Louann Batista, PT Physical Therapist          Therapy Education  Given: HEP, Symptoms/condition management  Program: New  How Provided: Verbal, Demonstration, Written  Provided to: Patient  Level of Understanding: Verbalized, Demonstrated    Outcome Measure Options: Lower Extremity Functional Scale (LEFS), Anurag Perez  Lower Extremity Functional Index  Any of  your usual work, housework or school activities: Moderate difficulty  Your usual hobbies, recreational or sporting activities: Extreme difficulty or unable to perform activity  Getting into or out of the bath: Moderate difficulty  Walking between rooms: A little bit of difficulty  Putting on your shoes or socks: No difficulty  Squatting: A little bit of difficulty  Lifting an object, like a bag of groceries from the floor: A little bit of difficulty  Performing light activities around your home: A little bit of difficulty  Performing heavy activities around your home: Extreme difficulty or unable to perform activity  Getting into or out of a car: A little bit of difficulty  Walking 2 blocks: Quite a bit of difficulty  Walking a mile: Extreme difficulty or unable to perform activity  Going up or down 10 stairs (about 1 flight of stairs): Moderate difficulty  Standing for 1 hour: Extreme difficulty or unable to perform activity  Sitting for 1 hour: No difficulty  Running on even ground: Extreme difficulty or unable to perform activity  Running on uneven ground: Extreme difficulty or unable to perform activity  Making sharp turns while running fast: Extreme difficulty or unable to perform activity  Hopping: Quite a bit of difficulty  Rolling over in bed: A little bit of difficulty  Total: 34  Modified Oswestry  Modified Oswestry Score/Comments: 22/50=44%      Time Calculation:   Start Time: 1430  Total Timed Code Minutes- PT: 45 minute(s)  Therapy Charges for Today     Code Description Service Date Service Provider Modifiers Qty    [id number]  PT THER PROC EA 15 MIN 6/19/2019 Louann Machado, PT GP 3          PT G-Codes  Outcome Measure Options: Lower Extremity Functional Scale (LEFS), Anurag Perez  Total: 34  Modified Oswestry Score/Comments: 22/50=44%         Louann Machado, PT  6/19/2019

## 2019-06-21 DIAGNOSIS — E03.9 HYPOTHYROIDISM, UNSPECIFIED TYPE: ICD-10-CM

## 2019-06-21 RX ORDER — LEVOTHYROXINE SODIUM 0.03 MG/1
25 TABLET ORAL DAILY
Qty: 90 TABLET | Refills: 0 | Status: SHIPPED | OUTPATIENT
Start: 2019-06-21 | End: 2019-09-23 | Stop reason: SDUPTHER

## 2019-06-26 ENCOUNTER — APPOINTMENT (OUTPATIENT)
Dept: PHYSICAL THERAPY | Facility: HOSPITAL | Age: 69
End: 2019-06-26

## 2019-07-15 ENCOUNTER — DOCUMENTATION (OUTPATIENT)
Dept: PHYSICAL THERAPY | Facility: HOSPITAL | Age: 69
End: 2019-07-15

## 2019-07-15 DIAGNOSIS — M25.552 LEFT HIP PAIN: Primary | ICD-10-CM

## 2019-07-15 NOTE — THERAPY DISCHARGE NOTE
Outpatient Physical Therapy Discharge Summary         Patient Name: Lucian Bermudez  : 1950  MRN: [id number]    Today's Date: 7/15/2019    Visit Dx:    ICD-10-CM ICD-9-CM   1. Left hip pain M25.552 719.45       PT OP Goals     Row Name 07/15/19 0900          PT Short Term Goals    STG Date to Achieve  19  -ANDRÉS     STG 1  client will be independent with initial HEP program  -ANDRÉS     STG 1 Progress  Ongoing  -ANDRÉS     STG 2  cleint will complete ADLs without pain  -ANDRÉS     STG 2 Progress  Ongoing  -ANDRÉS     STG 3  client will demonstrate left hip strength 4+/5  -ANDRÉS     STG 3 Progress  Ongoing  -ANDRÉS     STG 4  Pt. reports reduction in LLE pain intensity and distribution.  -ANDRÉS        Long Term Goals    LTG Date to Achieve  19  -ANDRÉS     LTG 1  client will be independent with comprehensive program  -ANDRÉS     LTG 1 Progress  Ongoing  -ANDRÉS     LTG 2  client will demonstrate normalized gait mechanics without pain   -ADNRÉS     LTG 2 Progress  Ongoing  -ANDRÉS     LTG 3  client will report LEFS increased to > 45/80  -ANDRÉS     LTG 3 Progress  Progressing;Ongoing  -ANDRÉS       User Key  (r) = Recorded By, (t) = Taken By, (c) = Cosigned By    Initials Name Provider Type    Louann Batista, PT Physical Therapist          OP PT Discharge Summary  Date of Discharge: 07/15/19  Reason for Discharge: Lack of progress  Outcomes Achieved: Unable to make functional progress toward goals at this time  Discharge Destination: Home with home program      Time Calculation:                    Louann Machado, FRANSISCO  7/15/2019

## 2019-09-23 DIAGNOSIS — E03.9 HYPOTHYROIDISM, UNSPECIFIED TYPE: ICD-10-CM

## 2019-09-24 RX ORDER — LEVOTHYROXINE SODIUM 0.03 MG/1
TABLET ORAL
Qty: 90 TABLET | Refills: 0 | Status: SHIPPED | OUTPATIENT
Start: 2019-09-24 | End: 2019-12-19

## 2019-12-19 DIAGNOSIS — E03.9 HYPOTHYROIDISM, UNSPECIFIED TYPE: ICD-10-CM

## 2019-12-19 RX ORDER — LEVOTHYROXINE SODIUM 0.03 MG/1
TABLET ORAL
Qty: 90 TABLET | Refills: 0 | Status: SHIPPED | OUTPATIENT
Start: 2019-12-19 | End: 2020-01-17 | Stop reason: SDUPTHER

## 2019-12-19 NOTE — TELEPHONE ENCOUNTER
Patient left a message stating that he will be leaving [place] and won't be back until the middle of April.    [facility], [place]

## 2019-12-22 RX ORDER — AMOXICILLIN AND CLAVULANATE POTASSIUM 400; 57 MG/5ML; MG/5ML
POWDER, FOR SUSPENSION ORAL
Qty: 220 ML | Refills: 0 | Status: SHIPPED | OUTPATIENT
Start: 2019-12-22 | End: 2020-07-23

## 2020-01-17 ENCOUNTER — TELEPHONE (OUTPATIENT)
Dept: FAMILY MEDICINE CLINIC | Facility: CLINIC | Age: 70
End: 2020-01-17

## 2020-01-17 DIAGNOSIS — E03.9 HYPOTHYROIDISM, UNSPECIFIED TYPE: ICD-10-CM

## 2020-01-17 RX ORDER — LEVOTHYROXINE SODIUM 0.03 MG/1
25 TABLET ORAL DAILY
Qty: 90 TABLET | Refills: 0 | Status: CANCELLED | OUTPATIENT
Start: 2020-01-17

## 2020-01-17 RX ORDER — LEVOTHYROXINE SODIUM 0.03 MG/1
25 TABLET ORAL DAILY
Qty: 90 TABLET | Refills: 1 | Status: SHIPPED | OUTPATIENT
Start: 2020-01-17 | End: 2020-07-14

## 2020-01-17 NOTE — TELEPHONE ENCOUNTER
Patient called to request a refill for the levothyroxine (SYNTHROID, LEVOTHROID) 25 MCG tablet. The patient said that his pharmacy informed him that there are no more refills left for his current prescription and they could only refill it if they received authorization to do so from Dr. Hui. The patient said that he only has 5 or 6 tablets left of this medication.    The patient said that he is currently in [place], [place] and will be there until the end of April, so he cannot come in office to be seen until May. The patient made an appointment to come in office when he gets back to [place] - the appointment is scheduled for 5/4/20 at 10:00 AM with Dr. Hui.    If his medication can be refilled before he returns to [place], the patient requested for the prescription to be sent to the pharmacy at [facility] in [place], [place] on [address].    If there are any questions or concerns please call the patient back at [phone] or the pharmacy at [facility] at [phone].

## 2020-02-07 DIAGNOSIS — R05.9 COUGH: Primary | ICD-10-CM

## 2020-07-14 DIAGNOSIS — E03.9 HYPOTHYROIDISM, UNSPECIFIED TYPE: ICD-10-CM

## 2020-07-14 RX ORDER — LEVOTHYROXINE SODIUM 0.03 MG/1
TABLET ORAL
Qty: 30 TABLET | Refills: 0 | Status: SHIPPED | OUTPATIENT
Start: 2020-07-14 | End: 2020-07-23 | Stop reason: SDUPTHER

## 2020-07-14 NOTE — TELEPHONE ENCOUNTER
Caller: Lucian Bermudez    Relationship: Self    Best call back number: [phone]     Medication needed:   Requested Prescriptions     Pending Prescriptions Disp Refills   • levothyroxine (SYNTHROID, LEVOTHROID) 25 MCG tablet [Pharmacy Med Name: Levothyroxine Sodium 25 MCG Oral Tablet] 90 tablet 0     Sig: TAKE 1 TABLET BY MOUTH ONCE DAILY . APPOINTMENT REQUIRED FOR FUTURE REFILLS       When do you need the refill by: SOON    What details did the patient provide when requesting the medication: PT HAS AN APPT ON 7/23.    Does the patient have less than a 3 day supply:  [] Yes  [x] No    What is the patient's preferred pharmacy: [facility] [address] [place] [address] [phone] [place] [phone] FX

## 2020-07-23 ENCOUNTER — OFFICE VISIT (OUTPATIENT)
Dept: FAMILY MEDICINE CLINIC | Facility: CLINIC | Age: 70
End: 2020-07-23

## 2020-07-23 VITALS
HEART RATE: 76 BPM | WEIGHT: 164 LBS | RESPIRATION RATE: 18 BRPM | TEMPERATURE: 97.3 F | HEIGHT: 71 IN | OXYGEN SATURATION: 99 % | BODY MASS INDEX: 22.96 KG/M2 | DIASTOLIC BLOOD PRESSURE: 70 MMHG | SYSTOLIC BLOOD PRESSURE: 100 MMHG

## 2020-07-23 DIAGNOSIS — Z13.220 ENCOUNTER FOR LIPID SCREENING FOR CARDIOVASCULAR DISEASE: ICD-10-CM

## 2020-07-23 DIAGNOSIS — Z12.5 SCREENING FOR PROSTATE CANCER: Primary | ICD-10-CM

## 2020-07-23 DIAGNOSIS — E03.9 HYPOTHYROIDISM, UNSPECIFIED TYPE: ICD-10-CM

## 2020-07-23 DIAGNOSIS — Z13.6 ENCOUNTER FOR LIPID SCREENING FOR CARDIOVASCULAR DISEASE: ICD-10-CM

## 2020-07-23 LAB
ALBUMIN SERPL-MCNC: 4 G/DL (ref 3.5–5.2)
ALBUMIN/GLOB SERPL: 1.3 G/DL
ALP SERPL-CCNC: 65 U/L (ref 39–117)
ALT SERPL W P-5'-P-CCNC: 22 U/L (ref 1–41)
ANION GAP SERPL CALCULATED.3IONS-SCNC: 9.2 MMOL/L (ref 5–15)
AST SERPL-CCNC: 24 U/L (ref 1–40)
BASOPHILS # BLD AUTO: 0.02 10*3/MM3 (ref 0–0.2)
BASOPHILS NFR BLD AUTO: 0.3 % (ref 0–1.5)
BILIRUB SERPL-MCNC: 1.2 MG/DL (ref 0–1.2)
BUN SERPL-MCNC: 22 MG/DL (ref 8–23)
BUN/CREAT SERPL: 25 (ref 7–25)
CALCIUM SPEC-SCNC: 9.7 MG/DL (ref 8.6–10.5)
CHLORIDE SERPL-SCNC: 99 MMOL/L (ref 98–107)
CHOLEST SERPL-MCNC: 114 MG/DL (ref 0–200)
CO2 SERPL-SCNC: 30.8 MMOL/L (ref 22–29)
CREAT SERPL-MCNC: 0.88 MG/DL (ref 0.76–1.27)
DEPRECATED RDW RBC AUTO: 43.7 FL (ref 37–54)
EOSINOPHIL # BLD AUTO: 0.13 10*3/MM3 (ref 0–0.4)
EOSINOPHIL NFR BLD AUTO: 2.3 % (ref 0.3–6.2)
ERYTHROCYTE [DISTWIDTH] IN BLOOD BY AUTOMATED COUNT: 12.4 % (ref 12.3–15.4)
GFR SERPL CREATININE-BSD FRML MDRD: 86 ML/MIN/1.73
GLOBULIN UR ELPH-MCNC: 3.2 GM/DL
GLUCOSE SERPL-MCNC: 86 MG/DL (ref 65–99)
HCT VFR BLD AUTO: 44.1 % (ref 37.5–51)
HDLC SERPL-MCNC: 49 MG/DL (ref 40–60)
HGB BLD-MCNC: 14.2 G/DL (ref 13–17.7)
IMM GRANULOCYTES # BLD AUTO: 0.01 10*3/MM3 (ref 0–0.05)
IMM GRANULOCYTES NFR BLD AUTO: 0.2 % (ref 0–0.5)
LDLC SERPL CALC-MCNC: 44 MG/DL (ref 0–100)
LDLC/HDLC SERPL: 0.9 {RATIO}
LYMPHOCYTES # BLD AUTO: 0.96 10*3/MM3 (ref 0.7–3.1)
LYMPHOCYTES NFR BLD AUTO: 16.8 % (ref 19.6–45.3)
MCH RBC QN AUTO: 30.2 PG (ref 26.6–33)
MCHC RBC AUTO-ENTMCNC: 32.2 G/DL (ref 31.5–35.7)
MCV RBC AUTO: 93.8 FL (ref 79–97)
MONOCYTES # BLD AUTO: 0.35 10*3/MM3 (ref 0.1–0.9)
MONOCYTES NFR BLD AUTO: 6.1 % (ref 5–12)
NEUTROPHILS NFR BLD AUTO: 4.25 10*3/MM3 (ref 1.7–7)
NEUTROPHILS NFR BLD AUTO: 74.3 % (ref 42.7–76)
NRBC BLD AUTO-RTO: 0 /100 WBC (ref 0–0.2)
PLATELET # BLD AUTO: 217 10*3/MM3 (ref 140–450)
PMV BLD AUTO: 11.1 FL (ref 6–12)
POTASSIUM SERPL-SCNC: 4.5 MMOL/L (ref 3.5–5.2)
PROT SERPL-MCNC: 7.2 G/DL (ref 6–8.5)
PSA SERPL-MCNC: 2.4 NG/ML (ref 0–4)
RBC # BLD AUTO: 4.7 10*6/MM3 (ref 4.14–5.8)
SODIUM SERPL-SCNC: 139 MMOL/L (ref 136–145)
TRIGL SERPL-MCNC: 104 MG/DL (ref 0–150)
VLDLC SERPL-MCNC: 20.8 MG/DL (ref 5–40)
WBC # BLD AUTO: 5.72 10*3/MM3 (ref 3.4–10.8)

## 2020-07-23 PROCEDURE — G0103 PSA SCREENING: HCPCS | Performed by: FAMILY MEDICINE

## 2020-07-23 PROCEDURE — 85025 COMPLETE CBC W/AUTO DIFF WBC: CPT | Performed by: FAMILY MEDICINE

## 2020-07-23 PROCEDURE — 80061 LIPID PANEL: CPT | Performed by: FAMILY MEDICINE

## 2020-07-23 PROCEDURE — 99213 OFFICE O/P EST LOW 20 MIN: CPT | Performed by: FAMILY MEDICINE

## 2020-07-23 PROCEDURE — 80053 COMPREHEN METABOLIC PANEL: CPT | Performed by: FAMILY MEDICINE

## 2020-07-23 RX ORDER — LEVOTHYROXINE SODIUM 0.05 MG/1
25 TABLET ORAL DAILY
Qty: 90 TABLET | Refills: 3 | Status: SHIPPED | OUTPATIENT
Start: 2020-07-23 | End: 2020-09-16 | Stop reason: ALTCHOICE

## 2020-07-23 NOTE — PROGRESS NOTES
"Subjective   Lucian Bermudez is a 70 y.o. male seen today for Hypothyroidism.     History of Present Illness   The patient is here today for a follow up on Hypothyroidism.    States he is doing better. He lives in [facility] in the winter months. States he had pneumonia in December and was in the hospital for 5 days then released with antibiotics.  States he started to feel bad again in February and went to his doctor in [place]. He did another chest xray and then MRI of the chest that revealed a baseball size spot. 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 Saw his Oncologist last month and he did another MRI that was slightly improved. He is scheduled for a repeat MRI with contrast next month.  Denies any chest pain or shortness of breath.    Taking Levothyroxine 25 mcg daily.    The following portions of the patient's history were reviewed and updated as appropriate: allergies, current medications, past social history and problem list.    Review of Systems   Respiratory: Negative for shortness of breath.    Cardiovascular: Negative for chest pain.       Objective   /70   Pulse 76   Temp 97.3 °F (36.3 °C)   Resp 18   Ht 180.3 cm (71\")   Wt 74.4 kg (164 lb)   SpO2 99%   BMI 22.87 kg/m²   Physical Exam   Constitutional: He appears well-developed and well-nourished.   Cardiovascular: Normal rate and regular rhythm.   Pulmonary/Chest: Effort normal.   Some congestion in   Nursing note and vitals reviewed.      Assessment/Plan   Problem List Items Addressed This Visit        Endocrine    Hypothyroidism              Drink plenty fluids.    Increase the Levothyroxine to 50 mcg daily. #30+11.    Check a CBC,CMP,Lipids,PSA, Free T 4,and TSH. Report results by letter.    With regards to lab results, patient is instructed to call the office if they have not received test results within 2 weeks time.    Follow up in 6 months.          Scribed for Dr Neli Hui by " Nayely Vila CMA.          I, Neil Hui MD, personally performed the services described in this documentation, as scribed by Nayely Vila in my presence, and is both accurate and complete.        (Please note that portions of this note were completed with a voice recognition program. Efforts were made to edit the dictations,but occasionally words are mis transcribed.)

## 2020-09-02 ENCOUNTER — LAB (OUTPATIENT)
Dept: FAMILY MEDICINE CLINIC | Facility: CLINIC | Age: 70
End: 2020-09-02

## 2020-09-02 DIAGNOSIS — E03.9 HYPOTHYROIDISM, UNSPECIFIED TYPE: Primary | ICD-10-CM

## 2020-09-02 LAB
T4 FREE SERPL-MCNC: 0.98 NG/DL (ref 0.93–1.7)
TSH SERPL DL<=0.05 MIU/L-ACNC: 11.9 UIU/ML (ref 0.27–4.2)

## 2020-09-02 PROCEDURE — 84439 ASSAY OF FREE THYROXINE: CPT | Performed by: FAMILY MEDICINE

## 2020-09-02 PROCEDURE — 84443 ASSAY THYROID STIM HORMONE: CPT | Performed by: FAMILY MEDICINE

## 2020-09-16 ENCOUNTER — TELEPHONE (OUTPATIENT)
Dept: FAMILY MEDICINE CLINIC | Facility: CLINIC | Age: 70
End: 2020-09-16

## 2020-09-16 DIAGNOSIS — E03.9 ACQUIRED HYPOTHYROIDISM: Primary | ICD-10-CM

## 2020-09-16 RX ORDER — LEVOTHYROXINE SODIUM 0.1 MG/1
100 TABLET ORAL DAILY
Qty: 30 TABLET | Refills: 2 | Status: SHIPPED | OUTPATIENT
Start: 2020-09-16 | End: 2020-12-16 | Stop reason: DRUGHIGH

## 2020-09-16 NOTE — TELEPHONE ENCOUNTER
Patient requested a call back. Patient stated he was in on 9/2/20 and did lab work that showed his TSH was very high. Patient would like to know if he needs to be seen about this or if he needs medication called in.     Please call and advise. Patient call back [phone]    [facility] [address] [place] [address] [phone] [facility] [phone] FX

## 2020-12-10 ENCOUNTER — TELEPHONE (OUTPATIENT)
Dept: FAMILY MEDICINE CLINIC | Facility: CLINIC | Age: 70
End: 2020-12-10

## 2020-12-10 NOTE — TELEPHONE ENCOUNTER
----- Message from Nayely Vila MA sent at 12/10/2020  3:21 PM EST -----  Regarding: FW: LABS  Contact: [phone]  Dr Felix wants to see him in the office for a visit and to recheck his labs/JJcma  ----- Message -----  From: Angy Curran  Sent: 12/10/2020   2:49 PM EST  To: Nayely Vila MA  Subject: FW: LABS                                         Can you ask Ez about this please   ----- Message -----  From: Shelly Acuna  Sent: 12/10/2020   8:29 AM EST  To: Breann Calvert MA  Subject: LABS                                             PT STATES THAT DR FELIX WAS GOING TO PUT LAB ORDERS IN, BUT I DON'T SEE ANY IN HIS CHART.

## 2020-12-16 ENCOUNTER — OFFICE VISIT (OUTPATIENT)
Dept: FAMILY MEDICINE CLINIC | Facility: CLINIC | Age: 70
End: 2020-12-16

## 2020-12-16 VITALS
OXYGEN SATURATION: 96 % | HEART RATE: 70 BPM | WEIGHT: 158 LBS | TEMPERATURE: 97.3 F | DIASTOLIC BLOOD PRESSURE: 90 MMHG | HEIGHT: 71 IN | BODY MASS INDEX: 22.12 KG/M2 | SYSTOLIC BLOOD PRESSURE: 118 MMHG | RESPIRATION RATE: 16 BRPM

## 2020-12-16 DIAGNOSIS — E03.9 HYPOTHYROIDISM, UNSPECIFIED TYPE: Primary | ICD-10-CM

## 2020-12-16 PROCEDURE — 84443 ASSAY THYROID STIM HORMONE: CPT | Performed by: FAMILY MEDICINE

## 2020-12-16 PROCEDURE — 36415 COLL VENOUS BLD VENIPUNCTURE: CPT | Performed by: FAMILY MEDICINE

## 2020-12-16 PROCEDURE — 99213 OFFICE O/P EST LOW 20 MIN: CPT | Performed by: FAMILY MEDICINE

## 2020-12-16 PROCEDURE — 84439 ASSAY OF FREE THYROXINE: CPT | Performed by: FAMILY MEDICINE

## 2020-12-16 RX ORDER — LEVOTHYROXINE SODIUM 0.07 MG/1
75 TABLET ORAL DAILY
Qty: 90 TABLET | Refills: 1 | Status: SHIPPED | OUTPATIENT
Start: 2020-12-16 | End: 2021-06-16 | Stop reason: SDUPTHER

## 2020-12-16 NOTE — PROGRESS NOTES
"Subjective   Lucian Bermudez is a 70 y.o. male seen today for Hypothyroidism.     History of Present Illness   The patient is here for a follow up on Hypothyroidism.    States he is doing good.  Denies any chest pain or shortness of breath.    TSH in September was elevated at 11.90 and Free T 4 was 0.98.  Taking Levothyroxine 100 mcg daily.    States he had a follow up CT at  and the spot on his lung has improved.    He has a feeding tube due to carcinoma of his tongue. States he is doing about 5 nutritional drinks daily.    The following portions of the patient's history were reviewed and updated as appropriate: allergies, current medications, past social history and problem list.    Review of Systems   Respiratory: Negative for shortness of breath.    Cardiovascular: Negative for chest pain.       Objective   /90   Pulse 70   Temp 97.3 °F (36.3 °C)   Resp 16   Ht 180.3 cm (71\")   Wt 71.7 kg (158 lb)   SpO2 96%   BMI 22.04 kg/m²   Physical Exam  Vitals signs and nursing note reviewed.   Constitutional:       Appearance: Normal appearance.   Cardiovascular:      Rate and Rhythm: Normal rate and regular rhythm.   Pulmonary:      Effort: Pulmonary effort is normal.      Breath sounds: Normal breath sounds.   Neurological:      Mental Status: He is alert.         Assessment/Plan   Problems Addressed this Visit        Endocrine    Hypothyroidism - Primary      Diagnoses       Codes Comments    Hypothyroidism, unspecified type    -  Primary ICD-10-CM: E03.9  ICD-9-CM: 244.9               Drink plenty fluids.    Decrease Levothyroxine to 75 mcg daily #90+1.    Check a Free T 4 and TSH.    Follow up in 6 months. Sooner if needed.            Scribed for Dr Neil Hui by Nayely Vila CMA.          I, Neil Hui MD, personally performed the services described in this documentation, as scribed by Nayely Vila in my presence, and is both accurate and complete.        (Please note that portions of this " note were completed with a voice recognition program. Efforts were made to edit the dictations,but occasionally words are mis transcribed.)

## 2020-12-17 LAB
T4 FREE SERPL-MCNC: 1.24 NG/DL (ref 0.93–1.7)
TSH SERPL DL<=0.05 MIU/L-ACNC: 2.26 UIU/ML (ref 0.27–4.2)

## 2021-03-15 PROBLEM — J96.01 ACUTE RESPIRATORY FAILURE WITH HYPOXIA: Status: ACTIVE | Noted: 2017-08-20

## 2021-03-15 PROBLEM — J42 CHRONIC BRONCHITIS (HCC): Status: ACTIVE | Noted: 2021-03-15

## 2021-03-15 PROBLEM — Z85.819 HISTORY OF THROAT CANCER: Status: ACTIVE | Noted: 2019-11-13

## 2021-03-15 PROBLEM — E03.9 HYPOTHYROID: Status: ACTIVE | Noted: 2019-03-18

## 2021-03-15 PROBLEM — M17.9 OSTEOARTHRITIS OF KNEE: Status: ACTIVE | Noted: 2021-03-15

## 2021-03-15 PROBLEM — J69.0 ASPIRATION PNEUMONIA: Status: ACTIVE | Noted: 2019-03-18

## 2021-03-15 PROBLEM — J18.9 PARAPNEUMONIC EFFUSION: Status: ACTIVE | Noted: 2019-11-15

## 2021-03-15 PROBLEM — R91.8 ABNORMAL FINDINGS ON DIAGNOSTIC IMAGING OF LUNG: Status: ACTIVE | Noted: 2021-03-15

## 2021-03-15 PROBLEM — I10 HTN (HYPERTENSION): Status: ACTIVE | Noted: 2019-03-18

## 2021-03-15 PROBLEM — Z97.8 USES FEEDING TUBE: Status: ACTIVE | Noted: 2019-03-18

## 2021-03-15 PROBLEM — J91.8 PARAPNEUMONIC EFFUSION: Status: ACTIVE | Noted: 2019-11-15

## 2021-05-10 ENCOUNTER — OFFICE VISIT (OUTPATIENT)
Dept: FAMILY MEDICINE CLINIC | Facility: CLINIC | Age: 71
End: 2021-05-10

## 2021-05-10 VITALS
TEMPERATURE: 97.7 F | RESPIRATION RATE: 18 BRPM | OXYGEN SATURATION: 98 % | SYSTOLIC BLOOD PRESSURE: 120 MMHG | DIASTOLIC BLOOD PRESSURE: 70 MMHG | BODY MASS INDEX: 22.57 KG/M2 | WEIGHT: 166.6 LBS | HEART RATE: 76 BPM | HEIGHT: 72 IN

## 2021-05-10 DIAGNOSIS — J42 CHRONIC BRONCHITIS, UNSPECIFIED CHRONIC BRONCHITIS TYPE (HCC): ICD-10-CM

## 2021-05-10 DIAGNOSIS — L03.211 CELLULITIS DIFFUSE, FACE: Primary | ICD-10-CM

## 2021-05-10 PROBLEM — R59.0 MEDIASTINAL ADENOPATHY: Status: ACTIVE | Noted: 2019-11-14

## 2021-05-10 PROCEDURE — 99214 OFFICE O/P EST MOD 30 MIN: CPT | Performed by: FAMILY MEDICINE

## 2021-05-10 RX ORDER — 1.1% SODIUM FLUORIDE PRESCRIPTION DENTAL CREAM 5 MG/G
CREAM DENTAL
COMMUNITY
Start: 2021-04-20 | End: 2021-12-09

## 2021-05-10 RX ORDER — ALBUTEROL SULFATE 2.5 MG/3ML
2.5 SOLUTION RESPIRATORY (INHALATION) EVERY 4 HOURS PRN
Qty: 30 EACH | Refills: 3 | Status: SHIPPED | OUTPATIENT
Start: 2021-05-10 | End: 2022-12-29 | Stop reason: SDUPTHER

## 2021-05-10 RX ORDER — ALBUTEROL SULFATE 2.5 MG/3ML
2.5 SOLUTION RESPIRATORY (INHALATION) EVERY 4 HOURS PRN
COMMUNITY
End: 2021-05-10 | Stop reason: SDUPTHER

## 2021-05-10 RX ORDER — SULFAMETHOXAZOLE AND TRIMETHOPRIM 200; 40 MG/5ML; MG/5ML
20 SUSPENSION ORAL 2 TIMES DAILY
Qty: 400 ML | Refills: 0 | Status: SHIPPED | OUTPATIENT
Start: 2021-05-10 | End: 2021-12-09

## 2021-05-10 NOTE — PROGRESS NOTES
Lucian Bermudez is a 71 y.o. male who presents today for Ear Problem (left ear redness and face)      He is a previously established Dr. Hui patient who also wants to establish care with a new PCP. It began last Thursday, when he had a headache and a fever up to 102. He notes his hearing has been fine throughout his illness but he noticed some ringing in his ear last Friday. On that Friday started taking Bactrim BID given to him in December by his oncologist Dr. Ingram for prevention for aspiration pneumonia when he felt the infection coming on. The headache and fever have since resolved. He is still having persistent redness, pain, swelling, and flaking of the skin to the left lateral side of his face which does not extend to the eye. He has also been taking children's Tylenol through his feeding tube for the fever and pain which gave him mild relief. The pain today is a 1/10, down from a 5/10 a when it started. He also notes he needs a refill on his Albuterol inhaler while he is here today.        Review of Systems   Constitutional: Positive for chills and fever. Negative for activity change, appetite change, diaphoresis, fatigue, unexpected weight gain and unexpected weight loss.   HENT: Positive for facial swelling, sinus pressure and tinnitus. Negative for dental problem, ear discharge, ear pain, hearing loss, mouth sores, rhinorrhea and swollen glands.    Eyes: Negative for blurred vision and visual disturbance.   Respiratory: Negative for chest tightness and shortness of breath.    Cardiovascular: Negative for chest pain and palpitations.   Genitourinary: Negative for decreased urine volume, frequency, nocturia and urgency.   Musculoskeletal: Negative for arthralgias and myalgias.   Skin: Positive for color change (erythema and swelling left side of face ) and rash. Negative for dry skin, skin lesions and bruise.   Allergic/Immunologic: Negative for environmental allergies.   Neurological: Negative for  "seizures, syncope, weakness and confusion.   Psychiatric/Behavioral: Negative for sleep disturbance and suicidal ideas.        The following portions of the patient's history were reviewed and updated as appropriate: allergies, current medications, past family history, past medical history, past social history, past surgical history and problem list.    Current Outpatient Medications on File Prior to Visit   Medication Sig Dispense Refill   • levothyroxine (Synthroid) 75 MCG tablet Take 1 tablet by mouth Daily. 90 tablet 1   • SF 5000 Plus 1.1 % cream      • [DISCONTINUED] albuterol (PROVENTIL) (2.5 MG/3ML) 0.083% nebulizer solution Take 2.5 mg by nebulization Every 4 (Four) Hours As Needed.     • [DISCONTINUED] NON FORMULARY Take 10 ml per G tube twice a day.       No current facility-administered medications on file prior to visit.       No Known Allergies     Visit Vitals  /70 (BP Location: Right arm, Patient Position: Sitting, Cuff Size: Adult)   Pulse 76   Temp 97.7 °F (36.5 °C) (Temporal)   Resp 18   Ht 182.9 cm (72\")   Wt 75.6 kg (166 lb 9.6 oz)   SpO2 98%   BMI 22.60 kg/m²        Physical Exam  Constitutional:       Appearance: Normal appearance.   HENT:      Head: Normocephalic and atraumatic.      Right Ear: Tympanic membrane, ear canal and external ear normal.      Left Ear: Tympanic membrane, ear canal and external ear normal.   Eyes:      Extraocular Movements: Extraocular movements intact.      Conjunctiva/sclera: Conjunctivae normal.   Cardiovascular:      Rate and Rhythm: Normal rate and regular rhythm.      Pulses: Normal pulses.      Heart sounds: Normal heart sounds.   Pulmonary:      Effort: Pulmonary effort is normal.      Breath sounds: Normal breath sounds.   Musculoskeletal:      Cervical back: Normal range of motion and neck supple.   Skin:     General: Skin is warm and dry.      Findings: Erythema and rash present. Rash is scaling (covering erythematous patch).          Neurological: "      General: No focal deficit present.      Mental Status: He is alert and oriented to person, place, and time. Mental status is at baseline.   Psychiatric:         Behavior: Behavior normal.          Results for orders placed or performed in visit on 12/16/20   T4, Free    Specimen: Arm, Left; Blood   Result Value Ref Range    Free T4 1.24 0.93 - 1.70 ng/dL   TSH    Specimen: Arm, Left; Blood   Result Value Ref Range    TSH 2.260 0.270 - 4.200 uIU/mL        Problems Addressed this Visit        Pulmonary and Pneumonias    Chronic bronchitis (CMS/HCC)     Patient has chronic bronchitis which he was given albuterol nebulizer by physician down in [place] which she feels is helped significantly.  Albuterol was refilled.         Relevant Medications    albuterol (PROVENTIL) (2.5 MG/3ML) 0.083% nebulizer solution       Other    Cellulitis diffuse, face - Primary     Patient will continue taking Bactrim but will take it at increased dose of 800mg/160mh twice a day to complete a 14-day course.  Patient was also advised to contact his oncologist and let them know that he had recent infection.  RTC/ED precautions given.         Relevant Medications    sulfamethoxazole-trimethoprim (BACTRIM,SEPTRA) 200-40 MG/5ML suspension      Diagnoses       Codes Comments    Cellulitis diffuse, face    -  Primary ICD-10-CM: L03.211  ICD-9-CM: 682.0     Chronic bronchitis, unspecified chronic bronchitis type (CMS/HCC)     ICD-10-CM: J42  ICD-9-CM: 491.9           Return in about 3 months (around 8/10/2021) for Medicare Wellness.    Parts of this office note have been dictated by voice recognition software. Grammatical and/or spelling errors may be present.    Wei Simmons MD   5/10/2021

## 2021-05-10 NOTE — ASSESSMENT & PLAN NOTE
Patient has chronic bronchitis which he was given albuterol nebulizer by physician down in [place] which she feels is helped significantly.  Albuterol was refilled.

## 2021-05-10 NOTE — ASSESSMENT & PLAN NOTE
Patient will continue taking Bactrim but will take it at increased dose of 800mg/160mh twice a day to complete a 14-day course.  Patient was also advised to contact his oncologist and let them know that he had recent infection.  RTC/ED precautions given.

## 2021-06-16 DIAGNOSIS — E03.9 HYPOTHYROIDISM, UNSPECIFIED TYPE: ICD-10-CM

## 2021-06-16 RX ORDER — LEVOTHYROXINE SODIUM 0.07 MG/1
75 TABLET ORAL DAILY
Qty: 90 TABLET | Refills: 1 | Status: SHIPPED | OUTPATIENT
Start: 2021-06-16 | End: 2021-12-09 | Stop reason: SDUPTHER

## 2021-06-16 NOTE — TELEPHONE ENCOUNTER
Caller: Lucian Bermudez    Relationship: Self    Best call back number: [phone]    Medication needed:   Requested Prescriptions     Pending Prescriptions Disp Refills   • levothyroxine (Synthroid) 75 MCG tablet 90 tablet 1     Sig: Take 1 tablet by mouth Daily.       When do you need the refill by: 06/16/2021    What additional details did the patient provide when requesting the medication: PATIENT IS COMPLETELY OUT OF MEDICATION.     Does the patient have less than a 3 day supply:  [x] Yes  [] No    What is the patient's preferred pharmacy: [facility] [address] [place] [address] [phone] [place] [phone] FX

## 2021-12-09 ENCOUNTER — OFFICE VISIT (OUTPATIENT)
Dept: FAMILY MEDICINE CLINIC | Facility: CLINIC | Age: 71
End: 2021-12-09

## 2021-12-09 VITALS
TEMPERATURE: 98 F | SYSTOLIC BLOOD PRESSURE: 110 MMHG | HEART RATE: 73 BPM | BODY MASS INDEX: 22.75 KG/M2 | DIASTOLIC BLOOD PRESSURE: 70 MMHG | HEIGHT: 72 IN | WEIGHT: 168 LBS | OXYGEN SATURATION: 98 %

## 2021-12-09 DIAGNOSIS — Z13.6 ENCOUNTER FOR LIPID SCREENING FOR CARDIOVASCULAR DISEASE: ICD-10-CM

## 2021-12-09 DIAGNOSIS — Z23 ENCOUNTER FOR IMMUNIZATION: ICD-10-CM

## 2021-12-09 DIAGNOSIS — R39.9 LOWER URINARY TRACT SYMPTOMS (LUTS): ICD-10-CM

## 2021-12-09 DIAGNOSIS — E03.9 HYPOTHYROIDISM, UNSPECIFIED TYPE: ICD-10-CM

## 2021-12-09 DIAGNOSIS — Z00.00 MEDICARE ANNUAL WELLNESS VISIT, SUBSEQUENT: Primary | ICD-10-CM

## 2021-12-09 DIAGNOSIS — Z12.5 SCREENING FOR PROSTATE CANCER: ICD-10-CM

## 2021-12-09 DIAGNOSIS — Z13.220 ENCOUNTER FOR LIPID SCREENING FOR CARDIOVASCULAR DISEASE: ICD-10-CM

## 2021-12-09 DIAGNOSIS — N52.03 COMBINED ARTERIAL INSUFFICIENCY AND CORPORO-VENOUS OCCLUSIVE ERECTILE DYSFUNCTION: ICD-10-CM

## 2021-12-09 PROBLEM — T17.800A ASPIRATION INTO LOWER RESPIRATORY TRACT: Status: ACTIVE | Noted: 2021-12-09

## 2021-12-09 PROBLEM — Z86.39 PERSONAL HISTORY OF OTHER ENDOCRINE, NUTRITIONAL AND METABOLIC DISEASE: Status: ACTIVE | Noted: 2021-12-09

## 2021-12-09 PROBLEM — C44.42 SQUAMOUS CELL CARCINOMA OF SKIN OF SCALP: Status: ACTIVE | Noted: 2021-12-09

## 2021-12-09 PROBLEM — Z85.819 PERSONAL HISTORY OF MALIGNANT NEOPLASM OF UNSPECIFIED SITE OF LIP, ORAL CAVITY, AND PHARYNX: Status: ACTIVE | Noted: 2021-12-09

## 2021-12-09 PROBLEM — C10.9 MALIGNANT NEOPLASM OF OROPHARYNX (HCC): Status: ACTIVE | Noted: 2021-06-19

## 2021-12-09 PROBLEM — R13.14 PHARYNGOESOPHAGEAL DYSPHAGIA: Status: ACTIVE | Noted: 2021-06-07

## 2021-12-09 LAB
BASOPHILS # BLD AUTO: 0.02 10*3/MM3 (ref 0–0.2)
BASOPHILS NFR BLD AUTO: 0.3 % (ref 0–1.5)
DEPRECATED RDW RBC AUTO: 40.5 FL (ref 37–54)
EOSINOPHIL # BLD AUTO: 0.18 10*3/MM3 (ref 0–0.4)
EOSINOPHIL NFR BLD AUTO: 2.5 % (ref 0.3–6.2)
ERYTHROCYTE [DISTWIDTH] IN BLOOD BY AUTOMATED COUNT: 11.8 % (ref 12.3–15.4)
HCT VFR BLD AUTO: 39 % (ref 37.5–51)
HGB BLD-MCNC: 12.9 G/DL (ref 13–17.7)
IMM GRANULOCYTES # BLD AUTO: 0.02 10*3/MM3 (ref 0–0.05)
IMM GRANULOCYTES NFR BLD AUTO: 0.3 % (ref 0–0.5)
LYMPHOCYTES # BLD AUTO: 0.81 10*3/MM3 (ref 0.7–3.1)
LYMPHOCYTES NFR BLD AUTO: 11 % (ref 19.6–45.3)
MCH RBC QN AUTO: 30.9 PG (ref 26.6–33)
MCHC RBC AUTO-ENTMCNC: 33.1 G/DL (ref 31.5–35.7)
MCV RBC AUTO: 93.5 FL (ref 79–97)
MONOCYTES # BLD AUTO: 0.58 10*3/MM3 (ref 0.1–0.9)
MONOCYTES NFR BLD AUTO: 7.9 % (ref 5–12)
NEUTROPHILS NFR BLD AUTO: 5.73 10*3/MM3 (ref 1.7–7)
NEUTROPHILS NFR BLD AUTO: 78 % (ref 42.7–76)
NRBC BLD AUTO-RTO: 0 /100 WBC (ref 0–0.2)
PLATELET # BLD AUTO: 211 10*3/MM3 (ref 140–450)
PMV BLD AUTO: 11.2 FL (ref 6–12)
RBC # BLD AUTO: 4.17 10*6/MM3 (ref 4.14–5.8)
WBC NRBC COR # BLD: 7.34 10*3/MM3 (ref 3.4–10.8)

## 2021-12-09 PROCEDURE — 1159F MED LIST DOCD IN RCRD: CPT | Performed by: FAMILY MEDICINE

## 2021-12-09 PROCEDURE — 1126F AMNT PAIN NOTED NONE PRSNT: CPT | Performed by: FAMILY MEDICINE

## 2021-12-09 PROCEDURE — 84439 ASSAY OF FREE THYROXINE: CPT | Performed by: FAMILY MEDICINE

## 2021-12-09 PROCEDURE — 80061 LIPID PANEL: CPT | Performed by: FAMILY MEDICINE

## 2021-12-09 PROCEDURE — 99214 OFFICE O/P EST MOD 30 MIN: CPT | Performed by: FAMILY MEDICINE

## 2021-12-09 PROCEDURE — 84443 ASSAY THYROID STIM HORMONE: CPT | Performed by: FAMILY MEDICINE

## 2021-12-09 PROCEDURE — 1170F FXNL STATUS ASSESSED: CPT | Performed by: FAMILY MEDICINE

## 2021-12-09 PROCEDURE — G0009 ADMIN PNEUMOCOCCAL VACCINE: HCPCS | Performed by: FAMILY MEDICINE

## 2021-12-09 PROCEDURE — 80053 COMPREHEN METABOLIC PANEL: CPT | Performed by: FAMILY MEDICINE

## 2021-12-09 PROCEDURE — G0439 PPPS, SUBSEQ VISIT: HCPCS | Performed by: FAMILY MEDICINE

## 2021-12-09 PROCEDURE — 90732 PPSV23 VACC 2 YRS+ SUBQ/IM: CPT | Performed by: FAMILY MEDICINE

## 2021-12-09 PROCEDURE — 85025 COMPLETE CBC W/AUTO DIFF WBC: CPT | Performed by: FAMILY MEDICINE

## 2021-12-09 PROCEDURE — G0103 PSA SCREENING: HCPCS | Performed by: FAMILY MEDICINE

## 2021-12-09 RX ORDER — TAMSULOSIN HYDROCHLORIDE 0.4 MG/1
1 CAPSULE ORAL DAILY
Qty: 30 CAPSULE | Refills: 3 | Status: SHIPPED | OUTPATIENT
Start: 2021-12-09 | End: 2022-11-10

## 2021-12-09 RX ORDER — SILDENAFIL 100 MG/1
100 TABLET, FILM COATED ORAL DAILY PRN
Qty: 30 TABLET | Refills: 3 | Status: SHIPPED | OUTPATIENT
Start: 2021-12-09 | End: 2022-12-29 | Stop reason: SDUPTHER

## 2021-12-09 RX ORDER — LEVOTHYROXINE SODIUM 0.07 MG/1
75 TABLET ORAL DAILY
Qty: 90 TABLET | Refills: 3 | Status: SHIPPED | OUTPATIENT
Start: 2021-12-09 | End: 2021-12-21 | Stop reason: SDUPTHER

## 2021-12-09 NOTE — ASSESSMENT & PLAN NOTE
Will check PSA and give trial of Flomax. He will ask pharmacy if this is able to be crushed up or dissolved to put in feeding tube. If LUTS persists will refer to urology.

## 2021-12-09 NOTE — PATIENT INSTRUCTIONS
Medicare Wellness  Personal Prevention Plan of Service     Date of Office Visit:  2021  Encounter Provider:  Wei Simmons MD  Place of Service:  [facility]  Patient Name: Lucian Bermudez  :  1950    As part of the Medicare Wellness portion of your visit today, we are providing you with this personalized preventive plan of services (PPPS). This plan is based upon recommendations of the [place] Preventive Services Task Force (USPSTF) and the Advisory Committee on Immunization Practices (ACIP).    This lists the preventive care services that should be considered, and provides dates of when you are due. Items listed as completed are up-to-date and do not require any further intervention.    Health Maintenance   Topic Date Due   • TDAP/TD VACCINES (1 - Tdap) Never done   • ZOSTER VACCINE (1 of 2) Never done   • HEPATITIS C SCREENING  Never done   • ANNUAL WELLNESS VISIT  Never done   • Pneumococcal Vaccine 65+ (1 of 2 - PPSV23) 2017   • COVID-19 Vaccine (2 - Moderna 3-dose booster series) 2021   • COLORECTAL CANCER SCREENING  05/15/2022   • INFLUENZA VACCINE  Completed       Orders Placed This Encounter   Procedures   • Pneumococcal Polysaccharide Vaccine 23-Valent Greater Than or Equal To 3yo Subcutaneous / IM   • Comprehensive Metabolic Panel     Order Specific Question:   Release to patient     Answer:   Immediate   • Lipid Panel   • PSA Screen     Order Specific Question:   Release to patient     Answer:   Immediate   • TSH Rfx On Abnormal To Free T4     Order Specific Question:   Release to patient     Answer:   Immediate   • CBC & Differential     Order Specific Question:   Manual Differential     Answer:   No       Return in about 1 year (around 2022) for Medicare Wellness.        Preventive Care 65 Years and Older, Male  Preventive care refers to lifestyle choices and visits with your health care provider that can promote health and  wellness. This includes:  · A yearly physical exam. This is also called an annual well check.  · Regular dental and eye exams.  · Immunizations.  · Screening for certain conditions.  · Healthy lifestyle choices, such as diet and exercise.  What can I expect for my preventive care visit?  Physical exam  Your health care provider will check:  · Height and weight. These may be used to calculate body mass index (BMI), which is a measurement that tells if you are at a healthy weight.  · Heart rate and blood pressure.  · Your skin for abnormal spots.  Counseling  Your health care provider may ask you questions about:  · Alcohol, tobacco, and drug use.  · Emotional well-being.  · Home and relationship well-being.  · Sexual activity.  · Eating habits.  · History of falls.  · Memory and ability to understand (cognition).  · Work and work environment.  What immunizations do I need?    Influenza (flu) vaccine  · This is recommended every year.  Tetanus, diphtheria, and pertussis (Tdap) vaccine  · You may need a Td booster every 10 years.  Varicella (chickenpox) vaccine  · You may need this vaccine if you have not already been vaccinated.  Zoster (shingles) vaccine  · You may need this after age 60.  Pneumococcal conjugate (PCV13) vaccine  · One dose is recommended after age 65.  Pneumococcal polysaccharide (PPSV23) vaccine  · One dose is recommended after age 65.  Measles, mumps, and rubella (MMR) vaccine  · You may need at least one dose of MMR if you were born in 1957 or later. You may also need a second dose.  Meningococcal conjugate (MenACWY) vaccine  · You may need this if you have certain conditions.  Hepatitis A vaccine  · You may need this if you have certain conditions or if you travel or work in places where you may be exposed to hepatitis A.  Hepatitis B vaccine  · You may need this if you have certain conditions or if you travel or work in places where you may be exposed to hepatitis B.  Haemophilus influenzae type  b (Hib) vaccine  · You may need this if you have certain conditions.  You may receive vaccines as individual doses or as more than one vaccine together in one shot (combination vaccines). Talk with your health care provider about the risks and benefits of combination vaccines.  What tests do I need?  Blood tests  · Lipid and cholesterol levels. These may be checked every 5 years, or more frequently depending on your overall health.  · Hepatitis C test.  · Hepatitis B test.  Screening  · Lung cancer screening. You may have this screening every year starting at age 55 if you have a 30-pack-year history of smoking and currently smoke or have quit within the past 15 years.  · Colorectal cancer screening. All adults should have this screening starting at age 50 and continuing until age 75. Your health care provider may recommend screening at age 45 if you are at increased risk. You will have tests every 1-10 years, depending on your results and the type of screening test.  · Prostate cancer screening. Recommendations will vary depending on your family history and other risks.  · Diabetes screening. This is done by checking your blood sugar (glucose) after you have not eaten for a while (fasting). You may have this done every 1-3 years.  · Abdominal aortic aneurysm (AAA) screening. You may need this if you are a current or former smoker.  · Sexually transmitted disease (STD) testing.  Follow these instructions at home:  Eating and drinking  · Eat a diet that includes fresh fruits and vegetables, whole grains, lean protein, and low-fat dairy products. Limit your intake of foods with high amounts of sugar, saturated fats, and salt.  · Take vitamin and mineral supplements as recommended by your health care provider.  · Do not drink alcohol if your health care provider tells you not to drink.  · If you drink alcohol:  ? Limit how much you have to 0-2 drinks a day.  ? Be aware of how much alcohol is in your drink. In the [place]  one drink equals one 12 oz bottle of beer (355 mL), one 5 oz glass of wine (148 mL), or one 1½ oz glass of hard liquor (44 mL).  Lifestyle  · Take daily care of your teeth and gums.  · Stay active. Exercise for at least 30 minutes on 5 or more days each week.  · Do not use any products that contain nicotine or tobacco, such as cigarettes, e-cigarettes, and chewing tobacco. If you need help quitting, ask your health care provider.  · If you are sexually active, practice safe sex. Use a condom or other form of protection to prevent STIs (sexually transmitted infections).  · Talk with your health care provider about taking a low-dose aspirin or statin.  What's next?  · Visit your health care provider once a year for a well check visit.  · Ask your health care provider how often you should have your eyes and teeth checked.  · Stay up to date on all vaccines.  This information is not intended to replace advice given to you by your health care provider. Make sure you discuss any questions you have with your health care provider.  Document Revised: 12/12/2019 Document Reviewed: 12/12/2019  Elsevier Patient Education © 2020 [employer]

## 2021-12-09 NOTE — PROGRESS NOTES
fThe ABCs of the Annual Wellness Visit  Subsequent Medicare Wellness Visit    Chief Complaint   Patient presents with   • Medicare Wellness-subsequent      Subjective    History of Present Illness:  Lucian Bermudez is a 71 y.o. male who presents for a Subsequent Medicare Wellness Visit.    The following portions of the patient's history were reviewed and   updated as appropriate: allergies, current medications, past family history, past medical history, past social history, past surgical history and problem list.    Compared to one year ago, the patient feels his physical   health is the same.    He has some concerns about erectile dysfuntion for several years and states he would like to try Sildenafil. He had a prior prescription for Viagra years ago but states he never picked it up from the pharmacy due to expense. He has a feeding tube and cannot take anything PO and is inquiring whether a tablet form can be dissolved in water and inserted through his feeding tube. Patient also reports nocturia and decreased stream. He has occasional incomplete emptying and dribbling. He denies daytime frequency, urgency,  trouble with stopping or starting, and burning urination.     He has a PMHx of recurrent pneumonia, however he has not been admitted to the hospital for this problem for 1.5 years. Today he endorses daily persistent cough productive of thick greenish sputum that is chronic and ongoing for several years. He states that Dr. Valentino (Head and [facility]) took a sputum culture during his last visit in June of this year but he has not heard anything back from their office. He uses his albuterol inhaler as needed and states it helps him produce sputum. He denies fevers, dyspnea, hemoptysis, chest pain, fatigue, weakness.     Compared to one year ago, the patient feels his mental   health is the same.    Recent Hospitalizations:  He was not admitted to the hospital during the last year.       Current Medical  Providers:  Patient Care Team:  Wei Simmons MD as PCP - General (Family Medicine)  Valentino, Joseph, MD as Consulting Physician (Otolaryngology)  Kilo Valle MD as Consulting Physician (Dermatology)  Jamey Swann MD as Consulting Physician (Orthopedic Surgery)  Manisha Funk MD as Consulting Physician (Dermatology)    Outpatient Medications Prior to Visit   Medication Sig Dispense Refill   • albuterol (PROVENTIL) (2.5 MG/3ML) 0.083% nebulizer solution Take 2.5 mg by nebulization Every 4 (Four) Hours As Needed for Wheezing. 30 each 3   • levothyroxine (Synthroid) 75 MCG tablet Take 1 tablet by mouth Daily. 90 tablet 1   • SF 5000 Plus 1.1 % cream      • sulfamethoxazole-trimethoprim (BACTRIM,SEPTRA) 200-40 MG/5ML suspension Take 20 mL by mouth 2 (Two) Times a Day. 400 mL 0     No facility-administered medications prior to visit.       No opioid medication identified on active medication list. I have reviewed chart for other potential  high risk medication/s and harmful drug interactions in the elderly.          Aspirin is not on active medication list.  Aspirin use is not indicated based on review of current medical condition/s. Risk of harm outweighs potential benefits.  .    Patient Active Problem List   Diagnosis   • Hypothyroidism   • Pneumonia due to infectious organism   • Abnormal findings on diagnostic imaging of lung   • Acute respiratory failure with hypoxia (HCC)   • Chronic bronchitis (HCC)   • Uses feeding tube   • Hypothyroid   • Aspiration pneumonia (HCC)   • Parapneumonic effusion   • Osteoarthritis of knee   • HTN (hypertension)   • History of throat cancer   • Mediastinal adenopathy   • Cellulitis diffuse, face   • Aspiration into lower respiratory tract   • Inguinal hernia, left   • Malignant neoplasm of oropharynx (HCC)   • Personal history of malignant neoplasm of unspecified site of lip, oral cavity, and pharynx   • Personal history of other endocrine,  "nutritional and metabolic disease   • Pharyngoesophageal dysphagia   • Squamous cell carcinoma of base of tongue (HCC)   • Squamous cell carcinoma of skin of scalp   • Status post inguinal hernia repair   • Medicare annual wellness visit, subsequent   • Lower urinary tract symptoms (LUTS)   • Combined arterial insufficiency and corporo-venous occlusive erectile dysfunction     Advance Care Planning  Advance Directive is not on file.  ACP discussion was held with the patient during this visit. Patient has an advance directive (not in EMR), copy requested.    Review of Systems   Constitutional: Negative for chills, diaphoresis, fatigue and fever.   HENT: Negative for congestion, ear pain, postnasal drip, sinus pressure and sore throat.    Eyes: Negative for visual disturbance.   Respiratory: Positive for cough. Negative for chest tightness and shortness of breath.    Cardiovascular: Negative for chest pain, palpitations and leg swelling.   Gastrointestinal: Negative for abdominal pain, blood in stool, constipation, diarrhea, nausea and vomiting.   Genitourinary: Negative for difficulty urinating, frequency and hematuria.   Musculoskeletal: Negative for arthralgias and myalgias.   Skin: Negative for rash.   Neurological: Negative for dizziness, seizures, weakness and confusion.   Hematological: Does not bruise/bleed easily.   Psychiatric/Behavioral: Negative for dysphoric mood, sleep disturbance and suicidal ideas. The patient is not nervous/anxious.         Objective    Vitals:    12/09/21 0927   BP: 110/70   Pulse: 73   Temp: 98 °F (36.7 °C)   SpO2: 98%   Weight: 76.2 kg (168 lb)   Height: 182.9 cm (72\")   PainSc: 0-No pain     BMI Readings from Last 1 Encounters:   12/09/21 22.78 kg/m²   BMI is within normal parameters. No follow-up required.    Does the patient have evidence of cognitive impairment? No    Physical Exam  Constitutional:       General: He is not in acute distress.     Appearance: He is " well-developed. He is not diaphoretic.   HENT:      Head: Atraumatic.   Cardiovascular:      Rate and Rhythm: Normal rate and regular rhythm.      Heart sounds: Normal heart sounds. No murmur heard.  No friction rub. No gallop.    Pulmonary:      Effort: Pulmonary effort is normal. No tachypnea, bradypnea, prolonged expiration or respiratory distress.      Breath sounds: No stridor. Examination of the left-middle field reveals rales. Examination of the left-lower field reveals rales. Rales (Left lower lobe) present. No wheezing or rhonchi.      Comments: No dullness to percussion or decreased tactile fremitus  Abdominal:      General: The ostomy site is clean. Bowel sounds are normal. There is no distension.      Palpations: Abdomen is soft. There is no mass.      Tenderness: There is no abdominal tenderness. There is no guarding or rebound.      Hernia: No hernia is present.       Musculoskeletal:      Cervical back: Normal range of motion and neck supple.   Skin:     General: Skin is warm and dry.   Neurological:      Mental Status: He is alert and oriented to person, place, and time.   Psychiatric:         Behavior: Behavior normal.                 HEALTH RISK ASSESSMENT    Smoking Status:  Social History     Tobacco Use   Smoking Status Never Smoker   Smokeless Tobacco Never Used     Alcohol Consumption:  Social History     Substance and Sexual Activity   Alcohol Use Yes    Comment: occasional     Fall Risk Screen:    [facility] Fall Risk Assessment was completed, and patient is at LOW risk for falls.Assessment completed on:5/10/2021    Depression Screening:  PHQ-2/PHQ-9 Depression Screening 12/9/2021   Little interest or pleasure in doing things 0   Feeling down, depressed, or hopeless 0   Total Score 0       Health Habits and Functional and Cognitive Screening:  Functional & Cognitive Status 12/9/2021   Do you have difficulty preparing food and eating? Yes   Do you have difficulty bathing yourself, getting dressed  or grooming yourself? No   Do you have difficulty using the toilet? No   Do you have difficulty moving around from place to place? No   Do you have trouble with steps or getting out of a bed or a chair? No   Current Diet Well Balanced Diet   Dental Exam Not up to date   Eye Exam Up to date   Exercise (times per week) 4 times per week   Current Exercises Include Walking   Do you need help using the phone?  No   Are you deaf or do you have serious difficulty hearing?  No   Do you need help with transportation? No   Do you need help shopping? No   Do you need help preparing meals?  No   Do you need help with housework?  No   Do you need help with laundry? No   Do you need help taking your medications? No   Do you need help managing money? No   Do you ever drive or ride in a car without wearing a seat belt? No   Have you felt unusual stress, anger or loneliness in the last month? No   Who do you live with? Spouse   If you need help, do you have trouble finding someone available to you? No   Have you been bothered in the last four weeks by sexual problems? No   Do you have difficulty concentrating, remembering or making decisions? No       Age-appropriate Screening Schedule:  Refer to the list below for future screening recommendations based on patient's age, sex and/or medical conditions. Orders for these recommended tests are listed in the plan section. The patient has been provided with a written plan.    Health Maintenance   Topic Date Due   • TDAP/TD VACCINES (1 - Tdap) Never done   • ZOSTER VACCINE (1 of 2) Never done   • INFLUENZA VACCINE  Completed              Assessment/Plan   CMS Preventative Services Quick Reference  Risk Factors Identified During Encounter  Immunizations Discussed/Encouraged (specific Immunizations; Tdap, Influenza, Pneumococcal 23, Shingrix and COVID19     Already received COVID booster, flu vaccine, Pneumococcal, Zoster. None needed today.    The above risks/problems have been discussed  with the patient.  Follow up actions/plans if indicated are seen below in the Assessment/Plan Section.  Pertinent information has been shared with the patient in the After Visit Summary.    Diagnoses and all orders for this visit:    1. Medicare annual wellness visit, subsequent (Primary)  Assessment & Plan:  The patient is here for health maintenance visit.  Currently, the patient consumes a healthy diet and has an adequate exercise regimen.  Screening lab work is ordered.  Immunizations were reviewed today.  Advice and education was given regarding nutrition, aerobic exercise, routine dental evaluations, routine eye exams, reproductive health, cardiovascular risk reduction, sunscreen use, self skin examination (annual dermatology evaluations) and seatbelt use (general overall safety).  Further recommendations will be given if needed after lab evaluation.  Annual wellness evaluation is recommended.      Orders:  -     CBC & Differential  -     Comprehensive Metabolic Panel  -     Lipid Panel  -     PSA Screen  -     Pneumococcal Polysaccharide Vaccine 23-Valent Greater Than or Equal To 1yo Subcutaneous / IM    2. Lower urinary tract symptoms (LUTS)  Assessment & Plan:  Will check PSA and give trial of Flomax. He will ask pharmacy if this is able to be crushed up or dissolved to put in feeding tube. If LUTS persists will refer to urology.     Orders:  -     tamsulosin (FLOMAX) 0.4 MG capsule 24 hr capsule; Take 1 capsule by mouth Daily.  Dispense: 30 capsule; Refill: 3    3. Combined arterial insufficiency and corporo-venous occlusive erectile dysfunction  Assessment & Plan:  Will give viagra which he has used in the past. He will ask pharmacy if this is able to be crushed up or dissolved to put in feeding tube.       Orders:  -     sildenafil (VIAGRA) 100 MG tablet; Take 1 tablet by mouth Daily As Needed for Erectile Dysfunction (Take 1/2 to 1 tablet as needed for ED).  Dispense: 30 tablet; Refill: 3    4.  Hypothyroidism, unspecified type  -     TSH Rfx On Abnormal To Free T4  -     levothyroxine (Synthroid) 75 MCG tablet; Take 1 tablet by mouth Daily.  Dispense: 90 tablet; Refill: 3    5. Screening for prostate cancer  -     PSA Screen    6. Encounter for lipid screening for cardiovascular disease  -     Lipid Panel    7. Encounter for immunization   -     Pneumococcal Polysaccharide Vaccine 23-Valent Greater Than or Equal To 3yo Subcutaneous / IM      Follow Up:   Return in about 1 year (around 12/9/2022) for Medicare Wellness.     An After Visit Summary and PPPS were made available to the patient.    I attest that I have reviewed the student note and that the components of the history of the present illness, the physical exam, and the assessment and plan documented were performed by me or were performed in my presence by the student and verified by me.

## 2021-12-09 NOTE — ASSESSMENT & PLAN NOTE
Will give viagra which he has used in the past. He will ask pharmacy if this is able to be crushed up or dissolved to put in feeding tube.

## 2021-12-10 LAB
ALBUMIN SERPL-MCNC: 3.9 G/DL (ref 3.5–5.2)
ALBUMIN/GLOB SERPL: 1.1 G/DL
ALP SERPL-CCNC: 96 U/L (ref 39–117)
ALT SERPL W P-5'-P-CCNC: 16 U/L (ref 1–41)
ANION GAP SERPL CALCULATED.3IONS-SCNC: 6.3 MMOL/L (ref 5–15)
AST SERPL-CCNC: 21 U/L (ref 1–40)
BILIRUB SERPL-MCNC: 1.1 MG/DL (ref 0–1.2)
BUN SERPL-MCNC: 24 MG/DL (ref 8–23)
BUN/CREAT SERPL: 30.8 (ref 7–25)
CALCIUM SPEC-SCNC: 9.7 MG/DL (ref 8.6–10.5)
CHLORIDE SERPL-SCNC: 97 MMOL/L (ref 98–107)
CHOLEST SERPL-MCNC: 118 MG/DL (ref 0–200)
CO2 SERPL-SCNC: 33.7 MMOL/L (ref 22–29)
CREAT SERPL-MCNC: 0.78 MG/DL (ref 0.76–1.27)
GFR SERPL CREATININE-BSD FRML MDRD: 99 ML/MIN/1.73
GLOBULIN UR ELPH-MCNC: 3.4 GM/DL
GLUCOSE SERPL-MCNC: 103 MG/DL (ref 65–99)
HDLC SERPL-MCNC: 54 MG/DL (ref 40–60)
LDLC SERPL CALC-MCNC: 45 MG/DL (ref 0–100)
LDLC/HDLC SERPL: 0.81 {RATIO}
POTASSIUM SERPL-SCNC: 4.5 MMOL/L (ref 3.5–5.2)
PROT SERPL-MCNC: 7.3 G/DL (ref 6–8.5)
PSA SERPL-MCNC: 3.23 NG/ML (ref 0–4)
SODIUM SERPL-SCNC: 137 MMOL/L (ref 136–145)
T4 FREE SERPL-MCNC: 0.98 NG/DL (ref 0.93–1.7)
TRIGL SERPL-MCNC: 100 MG/DL (ref 0–150)
TSH SERPL DL<=0.05 MIU/L-ACNC: 6.2 UIU/ML (ref 0.27–4.2)
VLDLC SERPL-MCNC: 19 MG/DL (ref 5–40)

## 2021-12-21 DIAGNOSIS — E03.9 HYPOTHYROIDISM, UNSPECIFIED TYPE: ICD-10-CM

## 2021-12-21 RX ORDER — LEVOTHYROXINE SODIUM 88 UG/1
88 TABLET ORAL DAILY
Qty: 90 TABLET | Refills: 1 | Status: SHIPPED | OUTPATIENT
Start: 2021-12-21 | End: 2022-11-10

## 2022-11-09 ENCOUNTER — NURSE TRIAGE (OUTPATIENT)
Dept: CALL CENTER | Facility: HOSPITAL | Age: 72
End: 2022-11-09

## 2022-11-09 NOTE — TELEPHONE ENCOUNTER
"Shortness of breath. Started today. Wife is on the liner. Patient had throat cancer years ago. Permanent PEG. Every fall, tends to get aspiration pneumonia, and this is how he is presenting currently. Pulse is 93 and o2 is now 86%.     Reason for Disposition  • [1] Longstanding difficulty breathing (e.g., CHF, COPD, emphysema) AND [2] WORSE than normal    Additional Information  • Negative: SEVERE difficulty breathing (e.g., struggling for each breath, speaks in single words)  • Negative: [1] Breathing stopped AND [2] hasn't returned  • Negative: Choking on something  • Negative: Bluish (or gray) lips or face now  • Negative: Difficult to awaken or acting confused (e.g., disoriented, slurred speech)  • Negative: Passed out (i.e., lost consciousness, collapsed and was not responding)  • Negative: Wheezing started suddenly after medicine, an allergic food or bee sting  • Negative: Stridor  • Negative: Slow, shallow and weak breathing  • Negative: Sounds like a life-threatening emergency to the triager  • Negative: Chest pain  • Negative: [1] Wheezing (high pitched whistling sound) AND [2] previous asthma attacks or use of asthma medicines  • Negative: [1] Difficulty breathing AND [2] only present when coughing  • Negative: [1] Difficulty breathing AND [2] only from stuffy or runny nose  • Negative: [1] Difficulty breathing AND [2] within 14 days of COVID-19 Exposure  • Negative: [1] MODERATE difficulty breathing (e.g., speaks in phrases, SOB even at rest, pulse 100-120) AND [2] NEW-onset or WORSE than normal  • Negative: Wheezing can be heard across the room  • Negative: Drooling or spitting out saliva (because can't swallow)  • Negative: History of prior \"blood clot\" in leg or lungs (i.e., deep vein thrombosis, pulmonary embolism)  • Negative: History of inherited increased risk of blood clots (e.g., Factor 5 Leiden, Anti-thrombin 3, Protein C or Protein S deficiency, Prothrombin mutation)  • Negative: Major surgery " "in the past month  • Negative: Hip or leg fracture (broken bone) in past month (or had cast on leg or ankle in past month)  • Negative: Illness requiring prolonged bedrest in past month (e.g., immobilization, long hospital stay)  • Negative: Long-distance travel in past month (e.g., car, bus, train, plane; with trip lasting 6 or more hours)  • Negative: Cancer treatment in past six months (or has cancer now)  • Negative: Extra heart beats OR irregular heart beating   (i.e., \"palpitations\")  • Negative: Fever > 103 F (39.4 C)  • Negative: [1] Fever > 101 F (38.3 C) AND [2] age > 60 years  • Negative: [1] Fever > 100.0 F (37.8 C) AND [2] bedridden (e.g., nursing home patient, CVA, chronic illness, recovering from surgery)  • Negative: [1] Fever > 100.0 F (37.8 C) AND [2] diabetes mellitus or weak immune system (e.g., HIV positive, cancer chemo, splenectomy, organ transplant, chronic steroids)  • Negative: [1] Periods where breathing stops and then resumes normally AND [2] bedridden (e.g., nursing home patient, CVA)  • Negative: Pregnant or postpartum (from 0 to 6 weeks after delivery)  • Negative: Patient sounds very sick or weak to the triager  • Negative: [1] MILD difficulty breathing (e.g., minimal/no SOB at rest, SOB with walking, pulse <100) AND [2] NEW-onset or WORSE than normal    Answer Assessment - Initial Assessment Questions  1. RESPIRATORY STATUS: \"Describe your breathing?\" (e.g., wheezing, shortness of breath, unable to speak, severe coughing)       Short of breath- Usually 92-93% / currently at 85% on 2 fingers  2. ONSET: \"When did this breathing problem begin?\"       today  3. PATTERN \"Does the difficult breathing come and go, or has it been constant since it started?\"       Constant but has long lung hx and only one lung left  4. SEVERITY: \"How bad is your breathing?\" (e.g., mild, moderate, severe)     - MILD: No SOB at rest, mild SOB with walking, speaks normally in sentences, can lay down, no " "retractions, pulse < 100.     - MODERATE: SOB at rest, SOB with minimal exertion and prefers to sit, cannot lie down flat, speaks in phrases, mild retractions, audible wheezing, pulse 100-120.     - SEVERE: Very SOB at rest, speaks in single words, struggling to breathe, sitting hunched forward, retractions, pulse > 120       Moderate   5. RECURRENT SYMPTOM: \"Have you had difficulty breathing before?\" If Yes, ask: \"When was the last time?\" and \"What happened that time?\"       yes  6. CARDIAC HISTORY: \"Do you have any history of heart disease?\" (e.g., heart attack, angina, bypass surgery, angioplasty)       no  7. LUNG HISTORY: \"Do you have any history of lung disease?\"  (e.g., pulmonary embolus, asthma, emphysema)      8 years ago had pneumonia and had thoracotomy and thoracentesis at that time.  8. CAUSE: \"What do you think is causing the breathing problem?\"       Aspiration from PEG tube d/t bending over right after he ate  9. OTHER SYMPTOMS: \"Do you have any other symptoms? (e.g., dizziness, runny nose, cough, chest pain, fever)      Cough   10. PREGNANCY: \"Is there any chance you are pregnant?\" \"When was your last menstrual period?\"        no  11. TRAVEL: \"Have you traveled out of the country in the last month?\" (e.g., travel history, exposures)        no    Protocols used: BREATHING DIFFICULTY-ADULT-AH    "

## 2022-11-10 ENCOUNTER — TELEPHONE (OUTPATIENT)
Dept: FAMILY MEDICINE CLINIC | Facility: CLINIC | Age: 72
End: 2022-11-10

## 2022-11-10 ENCOUNTER — OFFICE VISIT (OUTPATIENT)
Dept: FAMILY MEDICINE CLINIC | Facility: CLINIC | Age: 72
End: 2022-11-10

## 2022-11-10 VITALS
WEIGHT: 166.4 LBS | OXYGEN SATURATION: 92 % | SYSTOLIC BLOOD PRESSURE: 88 MMHG | HEIGHT: 72 IN | DIASTOLIC BLOOD PRESSURE: 70 MMHG | RESPIRATION RATE: 26 BRPM | HEART RATE: 80 BPM | BODY MASS INDEX: 22.54 KG/M2 | TEMPERATURE: 97.8 F

## 2022-11-10 DIAGNOSIS — J69.0 ASPIRATION PNEUMONIA DUE TO REGURGITATED FOOD, UNSPECIFIED LATERALITY, UNSPECIFIED PART OF LUNG: Primary | ICD-10-CM

## 2022-11-10 PROCEDURE — 87077 CULTURE AEROBIC IDENTIFY: CPT | Performed by: FAMILY MEDICINE

## 2022-11-10 PROCEDURE — 87205 SMEAR GRAM STAIN: CPT | Performed by: FAMILY MEDICINE

## 2022-11-10 PROCEDURE — 87147 CULTURE TYPE IMMUNOLOGIC: CPT | Performed by: FAMILY MEDICINE

## 2022-11-10 PROCEDURE — 87070 CULTURE OTHR SPECIMN AEROBIC: CPT | Performed by: FAMILY MEDICINE

## 2022-11-10 PROCEDURE — 99214 OFFICE O/P EST MOD 30 MIN: CPT | Performed by: FAMILY MEDICINE

## 2022-11-10 PROCEDURE — 87186 SC STD MICRODIL/AGAR DIL: CPT | Performed by: FAMILY MEDICINE

## 2022-11-10 RX ORDER — NUT TX, LACT-REDUCED, IRON 0.09G-2.25
237 LIQUID (ML) ORAL
COMMUNITY
Start: 2022-05-06 | End: 2023-05-06

## 2022-11-10 RX ORDER — AMOXICILLIN AND CLAVULANATE POTASSIUM 600; 42.9 MG/5ML; MG/5ML
875 POWDER, FOR SUSPENSION ORAL 2 TIMES DAILY
Qty: 200 ML | Refills: 0 | Status: SHIPPED | OUTPATIENT
Start: 2022-11-10 | End: 2022-11-10 | Stop reason: SDUPTHER

## 2022-11-10 RX ORDER — LEVOTHYROXINE SODIUM 0.07 MG/1
75 TABLET ORAL DAILY
COMMUNITY
Start: 2022-09-19 | End: 2022-12-29 | Stop reason: SDUPTHER

## 2022-11-10 RX ORDER — AMOXICILLIN AND CLAVULANATE POTASSIUM 600; 42.9 MG/5ML; MG/5ML
875 POWDER, FOR SUSPENSION ORAL 2 TIMES DAILY
Qty: 146 ML | Refills: 0 | Status: SHIPPED | OUTPATIENT
Start: 2022-11-10 | End: 2022-11-20

## 2022-11-10 NOTE — TELEPHONE ENCOUNTER
Caller: [facility] [address] [place] [address] - [address] [facility] [place] - [phone]  - [phone]    Relationship: Pharmacy    Best call back number: [phone]    What is the best time to reach you: 9AM-9PM    Who are you requesting to speak with (clinical staff, provider,  specific staff member): CLINICAL    Do you know the name of the person who called: SOLEDAD    What was the call regarding: ORIGINAL PRESCRIPTION WRITTEN FOR 14 DAYS.  MEDICATION EXPIRES IN 10 ONCE IT IS COMPOUNDED.(WOULD HAVE TO COME IN FOR REFILL, PLEASE ADVISE.    ORIGINAL DOSAGE 7.2ML 2X A DAY, THIS WOULD NOT ADD UP TO THE 200ML PRESCRIBED, WOULD HAVE TO ALSO COME IN FOR REFILL, PLEASE ADVISE.    Do you require a callback: YES

## 2022-11-10 NOTE — PROGRESS NOTES
"Answers for HPI/ROS submitted by the patient on 11/9/2022  What is the primary reason for your visit?: Shortness of Breath  Chronicity: recurrent  Onset: today  Frequency: intermittently  Progression since onset: unchanged  Episode duration: 60 Minutes  abdominal pain: No  chest pain: No  claudication: No  coryza: No  ear pain: No  fever: No  headaches: No  hemoptysis: No  leg pain: No  leg swelling: No  neck pain: No  orthopnea: No  PND: No  rash: No  rhinorrhea: No  sore throat: No  sputum production: Yes  swollen glands: No  syncope: No  vomiting: Yes  wheezing: No  Aggravating factors: any activity    Chief Complaint  Cough (Pt is on a feeding tube and thinks he aspirated some of his food Monday night when he bent over. Pt also states his oxygen level has been low. )    Subjective        Lucian Bermudez presents to [facility]  History of Present Illness     Mr. Bermudez is a very pleasant 72-year-old male who presents today to the clinic for possible aspiration pneumonia.  He has a feeding tube secondary to oropharyngeal.  He had his feeding days ago and then bent down to pick something up and feels like he aspirated.  He monitors his O2 at home and reports that it has been around 93 which is not baseline 98%.  He is also hypotensive in office today however, he is hypotensive at baseline.  He reports increased shortness of breath and cough with sputum production    Objective   Vital Signs:  BP (!) 88/70   Pulse 80   Temp 97.8 °F (36.6 °C) (Temporal)   Resp 26   Ht 182.9 cm (72.01\")   Wt 75.5 kg (166 lb 6.4 oz)   SpO2 92%   BMI 22.56 kg/m²   Estimated body mass index is 22.56 kg/m² as calculated from the following:    Height as of this encounter: 182.9 cm (72.01\").    Weight as of this encounter: 75.5 kg (166 lb 6.4 oz).    BMI is within normal parameters. No other follow-up for BMI required.      Physical Exam  Constitutional:       Appearance: Normal appearance. He is not " ill-appearing or toxic-appearing.   HENT:      Head: Normocephalic.      Nose: Nose normal.   Cardiovascular:      Rate and Rhythm: Normal rate.      Pulses: Normal pulses.   Pulmonary:      Effort: Pulmonary effort is normal. No respiratory distress.      Breath sounds: Wheezing present.   Chest:      Chest wall: No tenderness.   Abdominal:      General: Abdomen is flat.   Skin:     General: Skin is warm.      Capillary Refill: Capillary refill takes less than 2 seconds.   Neurological:      General: No focal deficit present.      Mental Status: He is alert and oriented to person, place, and time.   Psychiatric:         Mood and Affect: Mood normal.         Behavior: Behavior normal.         Thought Content: Thought content normal.         Judgment: Judgment normal.        Result Review :      XR CHEST PA AND LATERAL-     Date of Exam: 11/10/2022 10:25 AM     Indication: suspected aspiration; J69.0-Pneumonitis due to inhalation of  food and vomit.     Comparison: Radiograph 04/17/2019     Technique: 2 radiographic views of the chest were obtained.     Findings:  The heart appears mildly enlarged. Patchy airspace disease is seen  within the right lower lobe. There may be mild additional patchy  airspace changes present within the left lung base. No significant  pleural effusion.. No acute osseous abnormality identified.     IMPRESSION:  Bibasilar airspace disease, right greater than left, consistent with  pneumonia. Follow-up to resolution recommended.     Assessment and Plan   Diagnoses and all orders for this visit:    1. Aspiration pneumonia due to regurgitated food, unspecified laterality, unspecified part of lung (HCC) (Primary)  Assessment & Plan:  High suspicion for aspiration pneumonia, therefore we will treat with Augmentin x14 days.  And obtain a sputum culture.  I will follow cultures and tailor antibiotic therapy as appropriate.  Chest x-ray obtained in office today shows bibasilar airspace disease right  greater than left.     Patient is nontoxic, he is maintaining O2 sat on room air.  He is not febrile.  He has hypotensive however he is fairly hypotensive at baseline.  He should continue to monitor his O2 and blood pressure at home.  Patient and his wife were given strict instructions that if patient experiences any he should report to the emergency room for possible admission    Orders:  -     XR Chest PA & Lateral (In Office)  -     Respiratory Culture - Sputum, Cough; Future    Other orders  -     amoxicillin-clavulanate (Augmentin ES-600) 600-42.9 MG/5ML suspension; Take 7.3 mL by mouth 2 (Two) Times a Day for 14 days.  Dispense: 200 mL; Refill: 0        Follow Up   No follow-ups on file.  Patient was given instructions and counseling regarding his condition or for health maintenance advice. Please see specific information pulled into the AVS if appropriate.       This document has been electronically signed by Aria Cordova DO   November 10, 2022 11:37 EST    The plan of care was discussed with the patient in detail. Any and all questions answered and pt felt comfortable with treatment plan.         Dictated Utilizing Dragon Dictation: Part of this note may be an electronic transcription/translation of spoken language to printed text using the Dragon Dictation System.     Aria Cordova D.O.  [facility]

## 2022-11-10 NOTE — ASSESSMENT & PLAN NOTE
High suspicion for aspiration pneumonia, therefore we will treat with Augmentin x14 days.  And obtain a sputum culture.  I will follow cultures and tailor antibiotic therapy as appropriate.  Chest x-ray obtained in office today shows bibasilar airspace disease right greater than left.     Patient is nontoxic, he is maintaining O2 sat on room air.  He is not febrile.  He has hypotensive however he is fairly hypotensive at baseline.  He should continue to monitor his O2 and blood pressure at home.  Patient and his wife were given strict instructions that if patient experiences any he should report to the emergency room for possible admission

## 2022-11-14 LAB
BACTERIA SPEC RESP CULT: ABNORMAL
GRAM STN SPEC: ABNORMAL

## 2022-12-13 NOTE — THERAPY EVALUATION
Outpatient Physical Therapy Ortho Initial Evaluation   [place]     Patient Name: Lucian Bermudez  : 1950  MRN: [id number]  Today's Date: 2019      Visit Date: 2019    Patient Active Problem List   Diagnosis   • Hypothyroidism   • Pneumonia due to infectious organism        Past Medical History:   Diagnosis Date   • Cancer (CMS/HCC)    • H/O colonoscopy         Past Surgical History:   Procedure Laterality Date   • CATARACT EXTRACTION     • COLONOSCOPY         Visit Dx:     ICD-10-CM ICD-9-CM   1. Left hip pain M25.552 719.45         Patient History     Row Name 19 0800             History    Chief Complaint  Pain  -CR      Type of Pain  Hip pain  -CR      Date Current Problem(s) Began  19  -CR      Brief Description of Current Complaint  68 yo male reports golfing last month and swinging and feeling a left hip pain.  He received x ray and corrtisone injection.  Injection has not been beneficial.  He is not optimistic therapy is the right course at this time.  Pain is not improving since onset, and is described as left hip down to the left knee.  He does note that when walking with a shopping car/or bending forward he feels releif.   -CR      Previous treatment for THIS PROBLEM  Injections  -CR      Patient/Caregiver Goals  Relieve pain;Improve mobility  -CR      Current Tobacco Use  no  -CR      Smoking Status  no  -CR      Hand Dominance  right-handed  -CR      Patient seeing anyone else for problem(s)?  MD  -CR      What clinical tests have you had for this problem?  X-ray  -CR      Results of Clinical Tests  joint arthritis/spur  -CR         Pain     Pain Location  Hip  -CR      Pain Frequency  Several days a week  -CR      Pain Description  Sore;Sharp  -CR      What Performance Factors Make the Current Problem(s) WORSE?  walking, standing  -CR      What Performance Factors Make the Current Problem(s) BETTER?  sitting, non weight bearing, leaning forwards  -CR      Is your  sleep disturbed?  Yes  -CR      Is medication used to assist with sleep?  No  -CR      Difficulties with ADL's?  yes  -CR      Difficulties with recreational activities?  yes  -CR         Fall Risk Assessment    Any falls in the past year:  No  -CR      Does patient have a fear of falling  No  -CR         Daily Activities    Primary Language  English  -CR      Teaching needs identified  Home Exercise Program;Management of Condition  -CR      Barriers to learning  None  -CR      Pt Participated in POC and Goals  Yes  -CR         Safety    Are you being hurt, hit, or frightened by anyone at home or in your life?  No  -CR      Are you being neglected by a caregiver  No  -CR        User Key  (r) = Recorded By, (t) = Taken By, (c) = Cosigned By    Initials Name Provider Type    CR Jean Overton, PT Physical Therapist          PT Ortho     Row Name 05/01/19 0900       Posture/Observations    Alignment Options  Thoracic kyphosis;Forward head;Scapular winging;Lumbar lordosis  -CR    Forward Head  Moderate  -CR    Thoracic Kyphosis  Severe  -CR    Scapular winging  Moderate  -CR    Lumbar lordosis  Decreased  -CR       Special Tests/Palpation    Special Tests/Palpation  Lumbar/SI;Hip  -CR       Lumbosacral Accessory Motions    Lumbosacral Accessory Motions Tested?  Yes  -CR    PA Glide- L3  Hypomobile  -CR    PA Glide- L4  Hypomobile  -CR    PA Glide- L5  Hypomobile  -CR       Lumbar/SI Special Tests    Stork Test (SI Dysfunction)  Negative  -CR    Trendelenburg Test (Gluteus Medius Weakness)  Positive  -CR    Thigh Thrust/Posterior Shear (SI Dysfunction)  Negative  -CR       Special Lumbosacral Questions    Do you have pain when standing on one leg?  No  -CR    Do you have pain going up/down stairs?  Yes  -CR       Hip/Thigh Palpation    Hip/Thigh Palpation?  Yes  -CR    Gluteus Medius  Tender  -CR    Gluteus Minimus  Tender  -CR       Hip Accessory Motions    Hip Accessory Motions Tested?  Yes  -CR    Compression   WNL  -CR    Posterior glide  Hypomobile  -CR       Hip Special Tests    Juliocesar test (tightness of ITB)  Positive  -CR    Leta’s test (tightness of ITB)  Positive  -CR    Hip scour test (labral vs hip pathology)  Negative  -CR       General ROM    RT Lower Ext  Rt Hip Flexion;Rt Hip Extension;Rt Hip External Rotation;Rt Hip Internal Rotation  -CR    LT Lower Ext  Lt Hip Flexion;Lt Hip External Rotation;Lt Hip Internal Rotation;Lt Hip Extension  -CR       Right Lower Ext    Rt Hip Extension PROM  lacking 8  -CR    Rt Hip Flexion PROM  115  -CR    Rt Hip External Rotation PROM  40  -CR    Rt Hip Internal Rotation PROM  25  -CR       Left Lower Ext    Lt Hip Extension PROM  lacking 10   -CR    Lt Hip Flexion PROM  110  -CR    Lt Hip External Rotation PROM  40  -CR    Lt Hip Internal Rotation PROM  25  -CR      User Key  (r) = Recorded By, (t) = Taken By, (c) = Cosigned By    Initials Name Provider Type    CR Jean Overton, PT Physical Therapist                      Therapy Education  Education Details: Client provided written HEP including SKTC, juliocesar stretch, and lumbar flexion  Given: HEP, Symptoms/condition management, Pain management  Program: New  How Provided: Verbal, Demonstration, Written  Provided to: Patient  Level of Understanding: Verbalized, Demonstrated     PT OP Goals     Row Name 05/01/19 1000          PT Short Term Goals    STG Date to Achieve  05/29/19  -CR     STG 1  client will be independent with initial HEP program  -CR     STG 1 Progress  New  -CR     STG 2  cleint will complete ADLs without pain  -CR     STG 2 Progress  New  -CR     STG 3  client will demonstrate left hip strength 4+/5  -CR     STG 3 Progress  New  -CR        Long Term Goals    LTG Date to Achieve  06/26/19  -CR     LTG 1  client will be independent with comprehensive program  -CR     LTG 1 Progress  New  -CR     LTG 2  client will demonstrate normalized gait mechanics without pain   -CR     LTG 2 Progress  New  -CR      LTG 3  client will report LEFS increased to > 45/80  -CR     LTG 3 Progress  New  -CR        Time Calculation    PT Goal Re-Cert Due Date  07/30/19  -CR       User Key  (r) = Recorded By, (t) = Taken By, (c) = Cosigned By    Initials Name Provider Type    Jean Jha, PT Physical Therapist          PT Assessment/Plan     Row Name 05/01/19 1009          PT Assessment    Functional Limitations  Impaired gait;Impaired locomotion;Performance in work activities;Performance in sport activities;Performance in leisure activities  -CR     Impairments  Poor body mechanics;Posture;Muscle strength;Range of motion;Pain;Joint mobility  -CR     Assessment Comments  68 yo male arrives today with chief complaint of left hip pain onset 1 month ago while golfing.  He reports pain has not resolved and is located in the left hip, altering gait and ADLs, and causing signficant discomfort in LE.  Upon [facility] eval, he does not demonstrate inter articular hip joint findings, however does show TTP and discomfort with hip muscular palpation around left side.  LE streen for myotomal and reflexes appear WNL.  Complaint of worsening symptoms with upright walking, that is improved with forward flexed ambulation may indicated that low back should also be considered in ongoing workup/exam.  Client expresses wishes to follow up with ortho prior to onset of full therapy POC.  Therapist educated on flexibility and initial HEP.  Differential diagnosis moving foward will be left hip pathology vs. low back contribution to left hip and leg pain.   -CR     Please refer to paper survey for additional self-reported information  Yes  -CR     Rehab Potential  Fair  -CR     Patient/caregiver participated in establishment of treatment plan and goals  Yes  -CR     Patient would benefit from skilled therapy intervention  Yes  -CR        PT Plan    PT Frequency  1x/week;2x/week  -CR     Predicted Duration of Therapy Intervention (Therapy Eval)  8 visits   -CR     Planned CPT's?  PT EVAL LOW COMPLEXITY: 66350;PT THER PROC EA 15 MIN: 44269;PT MANUAL THERAPY EA 15 MIN: 62693;PT HOT/COLD PACK WC NONMCARE: 45276;PT NEUROMUSC RE-EDUCATION EA 15 MIN: 42903;PT THER ACT EA 15 MIN: 40427;PT RE-EVAL: 73010;PT SELF CARE/HOME MGMT/TRAIN EA 15: 27566  -CR     Physical Therapy Interventions (Optional Details)  home exercise program;gait training;joint mobilization;manual therapy techniques;lumbar stabilization;neuromuscular re-education;patient/family education;ROM (Range of Motion);stretching;strengthening;transfer training  -CR     PT Plan Comments  Client expresses interest in follow up with ortho prior to continued therapy services.  PT POC will be established, however client may complete ortho consult first.   -CR       User Key  (r) = Recorded By, (t) = Taken By, (c) = Cosigned By    Initials Name Provider Type    Jean Jha, PT Physical Therapist                              Outcome Measure Options: Lower Extremity Functional Scale (LEFS)  Lower Extremity Functional Index  Any of your usual work, housework or school activities: Quite a bit of difficulty  Your usual hobbies, recreational or sporting activities: Extreme difficulty or unable to perform activity  Getting into or out of the bath: A little bit of difficulty  Walking between rooms: Moderate difficulty  Putting on your shoes or socks: A little bit of difficulty  Squatting: A little bit of difficulty  Lifting an object, like a bag of groceries from the floor: Moderate difficulty  Performing light activities around your home: A little bit of difficulty  Performing heavy activities around your home: Quite a bit of difficulty  Getting into or out of a car: A little bit of difficulty  Walking 2 blocks: Extreme difficulty or unable to perform activity  Walking a mile: Extreme difficulty or unable to perform activity  Going up or down 10 stairs (about 1 flight of stairs): Quite a bit of difficulty  Standing for  Detail Level: Zone 1 hour: Extreme difficulty or unable to perform activity  Sitting for 1 hour: No difficulty  Running on even ground: Extreme difficulty or unable to perform activity  Running on uneven ground: Extreme difficulty or unable to perform activity  Making sharp turns while running fast: Extreme difficulty or unable to perform activity  Hopping: Extreme difficulty or unable to perform activity  Rolling over in bed: Moderate difficulty  Total: 28      Time Calculation:     Start Time: 0830     Therapy Charges for Today     Code Description Service Date Service Provider Modifiers Qty    [id number] HC PT EVAL LOW COMPLEXITY 3 5/1/2019 Jean Overton, PT GP 1          PT G-Codes  Outcome Measure Options: Lower Extremity Functional Scale (LEFS)  Total: 28         Jean Overton, PT  5/1/2019       Render In Strict Bullet Format?: No Initiate Treatment: Spironolactone - taken1 po qd. Clinda lotion - aaa bid, cleanse affected area with bpo cleanser

## 2022-12-16 ENCOUNTER — HOSPITAL ENCOUNTER (INPATIENT)
Facility: HOSPITAL | Age: 72
LOS: 5 days | Discharge: HOME | DRG: 579 | End: 2022-12-21
Attending: EMERGENCY MEDICINE | Admitting: OTOLARYNGOLOGY
Payer: MEDICARE

## 2022-12-16 ENCOUNTER — APPOINTMENT (OUTPATIENT)
Dept: CT IMAGING | Facility: HOSPITAL | Age: 72
DRG: 579 | End: 2022-12-16
Payer: MEDICARE

## 2022-12-16 ENCOUNTER — OFFICE VISIT (OUTPATIENT)
Dept: FAMILY MEDICINE CLINIC | Facility: CLINIC | Age: 72
End: 2022-12-16

## 2022-12-16 VITALS
OXYGEN SATURATION: 98 % | WEIGHT: 163 LBS | SYSTOLIC BLOOD PRESSURE: 130 MMHG | TEMPERATURE: 97.6 F | BODY MASS INDEX: 22.08 KG/M2 | DIASTOLIC BLOOD PRESSURE: 80 MMHG | HEIGHT: 72 IN | HEART RATE: 81 BPM

## 2022-12-16 DIAGNOSIS — L03.221 CELLULITIS AND ABSCESS OF NECK: Primary | ICD-10-CM

## 2022-12-16 DIAGNOSIS — Z12.5 SCREENING FOR PROSTATE CANCER: ICD-10-CM

## 2022-12-16 DIAGNOSIS — L02.11 CELLULITIS AND ABSCESS OF NECK: Primary | ICD-10-CM

## 2022-12-16 DIAGNOSIS — R09.02 HYPOXIA: ICD-10-CM

## 2022-12-16 DIAGNOSIS — Z00.00 MEDICARE ANNUAL WELLNESS VISIT, SUBSEQUENT: Primary | ICD-10-CM

## 2022-12-16 DIAGNOSIS — J42 CHRONIC BRONCHITIS, UNSPECIFIED CHRONIC BRONCHITIS TYPE: ICD-10-CM

## 2022-12-16 DIAGNOSIS — I10 PRIMARY HYPERTENSION: ICD-10-CM

## 2022-12-16 DIAGNOSIS — E03.9 HYPOTHYROIDISM, UNSPECIFIED TYPE: ICD-10-CM

## 2022-12-16 DIAGNOSIS — L02.11 CELLULITIS AND ABSCESS OF NECK: ICD-10-CM

## 2022-12-16 DIAGNOSIS — L03.221 CELLULITIS AND ABSCESS OF NECK: ICD-10-CM

## 2022-12-16 PROBLEM — J69.0 ASPIRATION PNEUMONIA (HCC): Status: RESOLVED | Noted: 2019-03-18 | Resolved: 2022-12-16

## 2022-12-16 PROBLEM — L02.91 ABSCESS: Status: ACTIVE | Noted: 2022-12-16

## 2022-12-16 PROBLEM — J18.9 PNEUMONIA DUE TO INFECTIOUS ORGANISM: Status: RESOLVED | Noted: 2019-04-17 | Resolved: 2022-12-16

## 2022-12-16 PROBLEM — J96.01 ACUTE RESPIRATORY FAILURE WITH HYPOXIA: Status: RESOLVED | Noted: 2017-08-20 | Resolved: 2022-12-16

## 2022-12-16 PROBLEM — L03.211 CELLULITIS DIFFUSE, FACE: Status: RESOLVED | Noted: 2021-05-10 | Resolved: 2022-12-16

## 2022-12-16 LAB
ALBUMIN SERPL-MCNC: 3.9 G/DL (ref 3.5–5.2)
ALBUMIN/GLOB SERPL: 0.8 G/DL
ALP SERPL-CCNC: 90 U/L (ref 39–117)
ALT SERPL W P-5'-P-CCNC: 19 U/L (ref 1–41)
ANION GAP SERPL CALCULATED.3IONS-SCNC: 10 MMOL/L (ref 5–15)
AST SERPL-CCNC: 28 U/L (ref 1–40)
BASOPHILS # BLD AUTO: 0.02 10*3/MM3 (ref 0–0.2)
BASOPHILS NFR BLD AUTO: 0.2 % (ref 0–1.5)
BILIRUB SERPL-MCNC: 1.1 MG/DL (ref 0–1.2)
BUN SERPL-MCNC: 29 MG/DL (ref 8–23)
BUN/CREAT SERPL: 37.7 (ref 7–25)
CALCIUM SPEC-SCNC: 9.9 MG/DL (ref 8.6–10.5)
CHLORIDE SERPL-SCNC: 96 MMOL/L (ref 98–107)
CO2 SERPL-SCNC: 32 MMOL/L (ref 22–29)
CREAT BLDA-MCNC: 0.8 MG/DL
CREAT BLDA-MCNC: 0.8 MG/DL (ref 0.6–1.3)
CREAT SERPL-MCNC: 0.77 MG/DL (ref 0.76–1.27)
CRP SERPL-MCNC: 12.45 MG/DL (ref 0–0.5)
D-LACTATE SERPL-SCNC: 0.9 MMOL/L (ref 0.5–2)
DEPRECATED RDW RBC AUTO: 45.1 FL (ref 37–54)
EGFRCR SERPLBLD CKD-EPI 2021: 96.3 ML/MIN/1.73
EOSINOPHIL # BLD AUTO: 0.12 10*3/MM3 (ref 0–0.4)
EOSINOPHIL NFR BLD AUTO: 1.3 % (ref 0.3–6.2)
ERYTHROCYTE [DISTWIDTH] IN BLOOD BY AUTOMATED COUNT: 12.7 % (ref 12.3–15.4)
ERYTHROCYTE [SEDIMENTATION RATE] IN BLOOD: >130 MM/HR (ref 0–20)
GLOBULIN UR ELPH-MCNC: 4.6 GM/DL
GLUCOSE SERPL-MCNC: 108 MG/DL (ref 65–99)
HCT VFR BLD AUTO: 45.1 % (ref 37.5–51)
HGB BLD-MCNC: 14.1 G/DL (ref 13–17.7)
IMM GRANULOCYTES # BLD AUTO: 0.03 10*3/MM3 (ref 0–0.05)
IMM GRANULOCYTES NFR BLD AUTO: 0.3 % (ref 0–0.5)
LYMPHOCYTES # BLD AUTO: 1.08 10*3/MM3 (ref 0.7–3.1)
LYMPHOCYTES NFR BLD AUTO: 12 % (ref 19.6–45.3)
MCH RBC QN AUTO: 30.2 PG (ref 26.6–33)
MCHC RBC AUTO-ENTMCNC: 31.3 G/DL (ref 31.5–35.7)
MCV RBC AUTO: 96.6 FL (ref 79–97)
MONOCYTES # BLD AUTO: 0.62 10*3/MM3 (ref 0.1–0.9)
MONOCYTES NFR BLD AUTO: 6.9 % (ref 5–12)
NEUTROPHILS NFR BLD AUTO: 7.11 10*3/MM3 (ref 1.7–7)
NEUTROPHILS NFR BLD AUTO: 79.3 % (ref 42.7–76)
NRBC BLD AUTO-RTO: 0 /100 WBC (ref 0–0.2)
PLATELET # BLD AUTO: 227 10*3/MM3 (ref 140–450)
PMV BLD AUTO: 10.5 FL (ref 6–12)
POTASSIUM SERPL-SCNC: 4 MMOL/L (ref 3.5–5.2)
PROCALCITONIN SERPL-MCNC: 0.08 NG/ML (ref 0–0.25)
PROT SERPL-MCNC: 8.5 G/DL (ref 6–8.5)
RBC # BLD AUTO: 4.67 10*6/MM3 (ref 4.14–5.8)
SODIUM SERPL-SCNC: 138 MMOL/L (ref 136–145)
WBC NRBC COR # BLD: 8.98 10*3/MM3 (ref 3.4–10.8)

## 2022-12-16 PROCEDURE — 85025 COMPLETE CBC W/AUTO DIFF WBC: CPT | Performed by: PHYSICIAN ASSISTANT

## 2022-12-16 PROCEDURE — 1160F RVW MEDS BY RX/DR IN RCRD: CPT | Performed by: FAMILY MEDICINE

## 2022-12-16 PROCEDURE — 70491 CT SOFT TISSUE NECK W/DYE: CPT

## 2022-12-16 PROCEDURE — 1126F AMNT PAIN NOTED NONE PRSNT: CPT | Performed by: FAMILY MEDICINE

## 2022-12-16 PROCEDURE — 99284 EMERGENCY DEPT VISIT MOD MDM: CPT

## 2022-12-16 PROCEDURE — 82565 ASSAY OF CREATININE: CPT

## 2022-12-16 PROCEDURE — 86140 C-REACTIVE PROTEIN: CPT | Performed by: INTERNAL MEDICINE

## 2022-12-16 PROCEDURE — 80053 COMPREHEN METABOLIC PANEL: CPT | Performed by: PHYSICIAN ASSISTANT

## 2022-12-16 PROCEDURE — 25010000002 PIPERACILLIN SOD-TAZOBACTAM PER 1 G: Performed by: INTERNAL MEDICINE

## 2022-12-16 PROCEDURE — 25010000002 IOPAMIDOL 61 % SOLUTION: Performed by: EMERGENCY MEDICINE

## 2022-12-16 PROCEDURE — 1170F FXNL STATUS ASSESSED: CPT | Performed by: FAMILY MEDICINE

## 2022-12-16 PROCEDURE — 83605 ASSAY OF LACTIC ACID: CPT | Performed by: PHYSICIAN ASSISTANT

## 2022-12-16 PROCEDURE — G0439 PPPS, SUBSEQ VISIT: HCPCS | Performed by: FAMILY MEDICINE

## 2022-12-16 PROCEDURE — 85652 RBC SED RATE AUTOMATED: CPT | Performed by: INTERNAL MEDICINE

## 2022-12-16 PROCEDURE — 84145 PROCALCITONIN (PCT): CPT | Performed by: INTERNAL MEDICINE

## 2022-12-16 PROCEDURE — 99223 1ST HOSP IP/OBS HIGH 75: CPT | Performed by: INTERNAL MEDICINE

## 2022-12-16 PROCEDURE — 87040 BLOOD CULTURE FOR BACTERIA: CPT | Performed by: PHYSICIAN ASSISTANT

## 2022-12-16 RX ORDER — ALBUTEROL SULFATE 2.5 MG/3ML
2.5 SOLUTION RESPIRATORY (INHALATION) EVERY 4 HOURS PRN
Status: DISCONTINUED | OUTPATIENT
Start: 2022-12-16 | End: 2022-12-21 | Stop reason: HOSPADM

## 2022-12-16 RX ORDER — HEPARIN SODIUM 1000 [USP'U]/ML
25 INJECTION, SOLUTION INTRAVENOUS; SUBCUTANEOUS AS NEEDED
Status: DISCONTINUED | OUTPATIENT
Start: 2022-12-16 | End: 2022-12-16

## 2022-12-16 RX ORDER — LEVOTHYROXINE SODIUM 0.07 MG/1
75 TABLET ORAL DAILY
Status: DISCONTINUED | OUTPATIENT
Start: 2022-12-17 | End: 2022-12-17

## 2022-12-16 RX ORDER — NALOXONE HCL 0.4 MG/ML
0.4 VIAL (ML) INJECTION
Status: DISCONTINUED | OUTPATIENT
Start: 2022-12-16 | End: 2022-12-21 | Stop reason: HOSPADM

## 2022-12-16 RX ORDER — ACETAMINOPHEN 325 MG/1
650 TABLET ORAL EVERY 4 HOURS PRN
Status: DISCONTINUED | OUTPATIENT
Start: 2022-12-16 | End: 2022-12-17

## 2022-12-16 RX ORDER — ONDANSETRON 2 MG/ML
4 INJECTION INTRAMUSCULAR; INTRAVENOUS EVERY 6 HOURS PRN
Status: DISCONTINUED | OUTPATIENT
Start: 2022-12-16 | End: 2022-12-21 | Stop reason: HOSPADM

## 2022-12-16 RX ORDER — HEPARIN SODIUM 1000 [USP'U]/ML
50 INJECTION, SOLUTION INTRAVENOUS; SUBCUTANEOUS AS NEEDED
Status: DISCONTINUED | OUTPATIENT
Start: 2022-12-16 | End: 2022-12-16

## 2022-12-16 RX ORDER — HEPARIN SODIUM 10000 [USP'U]/100ML
23 INJECTION, SOLUTION INTRAVENOUS
Status: DISCONTINUED | OUTPATIENT
Start: 2022-12-16 | End: 2022-12-18

## 2022-12-16 RX ORDER — SODIUM CHLORIDE 9 MG/ML
40 INJECTION, SOLUTION INTRAVENOUS AS NEEDED
Status: DISCONTINUED | OUTPATIENT
Start: 2022-12-16 | End: 2022-12-21 | Stop reason: HOSPADM

## 2022-12-16 RX ORDER — MORPHINE SULFATE 2 MG/ML
1 INJECTION, SOLUTION INTRAMUSCULAR; INTRAVENOUS EVERY 4 HOURS PRN
Status: DISCONTINUED | OUTPATIENT
Start: 2022-12-16 | End: 2022-12-21 | Stop reason: HOSPADM

## 2022-12-16 RX ORDER — SODIUM CHLORIDE 0.9 % (FLUSH) 0.9 %
10 SYRINGE (ML) INJECTION EVERY 12 HOURS SCHEDULED
Status: DISCONTINUED | OUTPATIENT
Start: 2022-12-16 | End: 2022-12-21 | Stop reason: HOSPADM

## 2022-12-16 RX ORDER — HYDROCODONE BITARTRATE AND ACETAMINOPHEN 5; 325 MG/1; MG/1
1 TABLET ORAL EVERY 4 HOURS PRN
Status: DISCONTINUED | OUTPATIENT
Start: 2022-12-16 | End: 2022-12-17

## 2022-12-16 RX ORDER — HEPARIN SODIUM 1000 [USP'U]/ML
80 INJECTION, SOLUTION INTRAVENOUS; SUBCUTANEOUS ONCE
Status: COMPLETED | OUTPATIENT
Start: 2022-12-16 | End: 2022-12-17

## 2022-12-16 RX ORDER — SODIUM CHLORIDE 0.9 % (FLUSH) 0.9 %
10 SYRINGE (ML) INJECTION AS NEEDED
Status: DISCONTINUED | OUTPATIENT
Start: 2022-12-16 | End: 2022-12-21 | Stop reason: HOSPADM

## 2022-12-16 RX ADMIN — IOPAMIDOL 80 ML: 612 INJECTION, SOLUTION INTRAVENOUS at 20:19

## 2022-12-16 RX ADMIN — TAZOBACTAM SODIUM AND PIPERACILLIN SODIUM 3.38 G: 375; 3 INJECTION, SOLUTION INTRAVENOUS at 21:46

## 2022-12-16 NOTE — ASSESSMENT & PLAN NOTE
Cellulitis and abscess of the right neck.  I believe the abscess extends up into his neck.  Patient was sent to the ED by private vehicle for further evaluation and treatment.  ED was contacted

## 2022-12-16 NOTE — PROGRESS NOTES
The ABCs of the Annual Wellness Visit  Subsequent Medicare Wellness Visit    Subjective    Lucian Bermudez is a 72 y.o. male who presents for a Subsequent Medicare Wellness Visit.    The following portions of the patient's history were reviewed and   updated as appropriate: allergies, current medications, past family history, past medical history, past social history, past surgical history and problem list.    Compared to one year ago, the patient feels his physical   health is better other than lesion on his neck. Patient has had an area on the right lateral side of the base of the neck that has been present for a couple of months and has waxed and waned in size. It popped and drained and got smaller. In the last 2 weeks it has gotten larger and started draining foul smelling yellowish fluid. It is giving him some pain. Pain radiates into neck and shoulder. He has not had fever or chills.     Compared to one year ago, the patient feels his mental   health is the same.    Review of Systems   Constitutional: Negative for activity change, appetite change, fatigue, fever and unexpected weight change.   HENT: Negative for congestion and ear pain.    Eyes: Negative for visual disturbance.   Respiratory: Negative for cough, shortness of breath and wheezing.    Cardiovascular: Negative for chest pain, palpitations and leg swelling.   Gastrointestinal: Negative for abdominal pain, blood in stool, constipation, diarrhea, nausea and vomiting.   Endocrine: Negative for polydipsia and polyuria.   Genitourinary: Negative for dysuria and hematuria.   Musculoskeletal: Negative for arthralgias, back pain and myalgias.   Skin: Positive for wound (right side of neck). Negative for rash.   Allergic/Immunologic: Negative for immunocompromised state.   Neurological: Negative for dizziness, seizures, syncope, weakness and headaches.   Hematological: Does not bruise/bleed easily.   Psychiatric/Behavioral: Negative for hallucinations,  self-injury and suicidal ideas. The patient is not nervous/anxious.        Recent Hospitalizations:  He was not admitted to the hospital during the last year.       Current Medical Providers:  Patient Care Team:  Wei Simmons MD as PCP - General (Family Medicine)  Valentino, Joseph, MD as Consulting Physician (Otolaryngology)  Kilo Valle MD as Consulting Physician (Dermatology)  Jamey Swann MD as Consulting Physician (Orthopedic Surgery)  Manisha Funk MD as Consulting Physician (Dermatology)    Outpatient Medications Prior to Visit   Medication Sig Dispense Refill   • albuterol (PROVENTIL) (2.5 MG/3ML) 0.083% nebulizer solution Take 2.5 mg by nebulization Every 4 (Four) Hours As Needed for Wheezing. 30 each 3   • levothyroxine (SYNTHROID, LEVOTHROID) 75 MCG tablet Take 1 tablet by mouth Daily.     • Nutritional Supplements (Boost Very High Calorie) liquid Administer 237 mL per G tube 5 (Five) Times a Day.     • sildenafil (VIAGRA) 100 MG tablet Take 1 tablet by mouth Daily As Needed for Erectile Dysfunction (Take 1/2 to 1 tablet as needed for ED). 30 tablet 3     No facility-administered medications prior to visit.       No opioid medication identified on active medication list. I have reviewed chart for other potential  high risk medication/s and harmful drug interactions in the elderly.          Aspirin is not on active medication list.  Aspirin use is not indicated based on review of current medical condition/s. Risk of harm outweighs potential benefits.  .    Patient Active Problem List   Diagnosis   • Hypothyroidism   • Abnormal findings on diagnostic imaging of lung   • Chronic bronchitis (HCC)   • Uses feeding tube   • Hypothyroid   • Parapneumonic effusion   • Osteoarthritis of knee   • HTN (hypertension)   • History of throat cancer   • Mediastinal adenopathy   • Aspiration into lower respiratory tract   • Inguinal hernia, left   • Malignant neoplasm of oropharynx (HCC)  "  • Personal history of malignant neoplasm of unspecified site of lip, oral cavity, and pharynx   • Personal history of other endocrine, nutritional and metabolic disease   • Pharyngoesophageal dysphagia   • Squamous cell carcinoma of base of tongue (HCC)   • Squamous cell carcinoma of skin of scalp   • Status post inguinal hernia repair   • Medicare annual wellness visit, subsequent   • Lower urinary tract symptoms (LUTS)   • Combined arterial insufficiency and corporo-venous occlusive erectile dysfunction   • Cellulitis and abscess of neck   • Screening for prostate cancer     Advance Care Planning  Advance Directive is not on file.  ACP discussion was held with the patient during this visit. Patient does not have an advance directive, information provided.     Objective    Vitals:    12/16/22 1516   BP: 130/80   Pulse: 81   Temp: 97.6 °F (36.4 °C)   SpO2: 98%   Weight: 73.9 kg (163 lb)   Height: 182.9 cm (72.01\")   PainSc: 0-No pain     Estimated body mass index is 22.1 kg/m² as calculated from the following:    Height as of this encounter: 182.9 cm (72.01\").    Weight as of this encounter: 73.9 kg (163 lb).    BMI is within normal parameters. No other follow-up for BMI required.    Physical Exam  Constitutional:       General: He is not in acute distress.     Appearance: He is well-developed. He is not diaphoretic.   HENT:      Head: Atraumatic.   Cardiovascular:      Rate and Rhythm: Normal rate and regular rhythm.      Heart sounds: Normal heart sounds. No murmur heard.    No friction rub. No gallop.   Pulmonary:      Effort: Pulmonary effort is normal. No respiratory distress.      Breath sounds: Normal breath sounds. No stridor. No wheezing, rhonchi or rales.   Musculoskeletal:      Cervical back: Normal range of motion and neck supple.   Skin:     General: Skin is warm and dry.      Findings: Wound present.             Comments: Oval shaped raised area about 2cm in diameter with yellowish head some " surrounding erythema. Tenderness and firmness extended up the neck. When neck palpated purulent drainage come out of opening at base of the neck. Tender and warm to touch. copious foul smelling purulent drainage expressed.    Neurological:      Mental Status: He is alert and oriented to person, place, and time.   Psychiatric:         Behavior: Behavior normal.         Does the patient have evidence of cognitive impairment?   No            HEALTH RISK ASSESSMENT    Smoking Status:  Social History     Tobacco Use   Smoking Status Never   Smokeless Tobacco Never     Alcohol Consumption:  Social History     Substance and Sexual Activity   Alcohol Use Not Currently    Comment: occasional     Fall Risk Screen:    STEADI Fall Risk Assessment was completed, and patient is at LOW risk for falls.Assessment completed on:12/16/2022    Depression Screening:  PHQ-2/PHQ-9 Depression Screening 12/16/2022   Retired PHQ-9 Total Score -   Retired Total Score -   Little Interest or Pleasure in Doing Things 0-->not at all   Feeling Down, Depressed or Hopeless 0-->not at all   PHQ-9: Brief Depression Severity Measure Score 0       Health Habits and Functional and Cognitive Screening:  Functional & Cognitive Status 12/16/2022   Do you have difficulty preparing food and eating? Yes   Do you have difficulty bathing yourself, getting dressed or grooming yourself? No   Do you have difficulty using the toilet? No   Do you have difficulty moving around from place to place? No   Do you have trouble with steps or getting out of a bed or a chair? No   Current Diet Well Balanced Diet   Dental Exam Up to date   Eye Exam Up to date   Exercise (times per week) 3 times per week   Current Exercises Include Walking   Do you need help using the phone?  No   Are you deaf or do you have serious difficulty hearing?  No   Do you need help with transportation? No   Do you need help shopping? No   Do you need help preparing meals?  No   Do you need help with  housework?  No   Do you need help with laundry? No   Do you need help taking your medications? No   Do you need help managing money? No   Do you ever drive or ride in a car without wearing a seat belt? No   Have you felt unusual stress, anger or loneliness in the last month? No   Who do you live with? Spouse   If you need help, do you have trouble finding someone available to you? No   Have you been bothered in the last four weeks by sexual problems? No   Do you have difficulty concentrating, remembering or making decisions? No       Age-appropriate Screening Schedule:  Refer to the list below for future screening recommendations based on patient's age, sex and/or medical conditions. Orders for these recommended tests are listed in the plan section. The patient has been provided with a written plan.    Health Maintenance   Topic Date Due   • TDAP/TD VACCINES (1 - Tdap) 12/16/2022 (Originally 3/29/1969)   • ZOSTER VACCINE (1 of 2) 12/16/2023 (Originally [birth year]/29/2000)   • INFLUENZA VACCINE  Completed                CMS Preventative Services Quick Reference  Risk Factors Identified During Encounter:    Immunizations Discussed/Encouraged: Tdap, Shingrix and COVID19    The above risks/problems have been discussed with the patient.  Pertinent information has been shared with the patient in the After Visit Summary.    Diagnoses and all orders for this visit:    1. Medicare annual wellness visit, subsequent (Primary)  Assessment & Plan:  The patient is here for health maintenance visit.  Currently, the patient consumes a healthy diet and has an adequate exercise regimen.  Screening lab work is ordered.  Immunizations were reviewed today.  Advice and education was given regarding nutrition, aerobic exercise, routine dental evaluations, routine eye exams, reproductive health, cardiovascular risk reduction, sunscreen use, self skin examination (annual dermatology evaluations) and seatbelt use (general overall safety).  Further  recommendations will be given if needed after lab evaluation.  Annual wellness evaluation is recommended.      Orders:  -     TSH Rfx On Abnormal To Free T4; Future  -     Comprehensive Metabolic Panel; Future  -     CBC & Differential; Future  -     Lipid Panel; Future  -     PSA Screen; Future    2. Primary hypertension  -     TSH Rfx On Abnormal To Free T4; Future  -     Comprehensive Metabolic Panel; Future  -     CBC & Differential; Future  -     Lipid Panel; Future    3. Hypothyroidism, unspecified type  -     TSH Rfx On Abnormal To Free T4; Future    4. Screening for prostate cancer  -     PSA Screen; Future    5. Cellulitis and abscess of neck  Assessment & Plan:  Cellulitis and abscess of the right neck.  I believe the abscess extends up into his neck.  Patient was sent to the ED by private vehicle for further evaluation and treatment.  ED was contacted    Orders:  -     Cancel: Wound Culture - Wound, Neck; Future      Follow Up:   Next Medicare Wellness visit to be scheduled in 1 year.      An After Visit Summary and PPPS were made available to the patient.

## 2022-12-16 NOTE — Clinical Note
Level of Care: Telemetry [5]   Diagnosis: Cellulitis and abscess of neck [682.1.ICD-9-CM]   Admitting Physician: CHEPE ANGELES [[id number]]   Attending Physician: CHEPE ANGELES [[id number]]

## 2022-12-17 ENCOUNTER — APPOINTMENT (OUTPATIENT)
Dept: GENERAL RADIOLOGY | Facility: HOSPITAL | Age: 72
DRG: 579 | End: 2022-12-17
Payer: MEDICARE

## 2022-12-17 ENCOUNTER — APPOINTMENT (OUTPATIENT)
Dept: CARDIOLOGY | Facility: HOSPITAL | Age: 72
DRG: 579 | End: 2022-12-17
Payer: MEDICARE

## 2022-12-17 PROBLEM — E44.0 MODERATE MALNUTRITION (HCC): Status: ACTIVE | Noted: 2022-12-17

## 2022-12-17 LAB
ALBUMIN SERPL-MCNC: 2.9 G/DL (ref 3.5–5.2)
ALBUMIN/GLOB SERPL: 0.7 G/DL
ALP SERPL-CCNC: 78 U/L (ref 39–117)
ALT SERPL W P-5'-P-CCNC: 14 U/L (ref 1–41)
ANION GAP SERPL CALCULATED.3IONS-SCNC: 10 MMOL/L (ref 5–15)
APTT PPP: 38.7 SECONDS (ref 60–90)
AST SERPL-CCNC: 19 U/L (ref 1–40)
BASOPHILS # BLD AUTO: 0.02 10*3/MM3 (ref 0–0.2)
BASOPHILS NFR BLD AUTO: 0.3 % (ref 0–1.5)
BILIRUB SERPL-MCNC: 1 MG/DL (ref 0–1.2)
BUN SERPL-MCNC: 22 MG/DL (ref 8–23)
BUN/CREAT SERPL: 36.1 (ref 7–25)
CALCIUM SPEC-SCNC: 8.8 MG/DL (ref 8.6–10.5)
CHLORIDE SERPL-SCNC: 99 MMOL/L (ref 98–107)
CO2 SERPL-SCNC: 28 MMOL/L (ref 22–29)
CREAT SERPL-MCNC: 0.61 MG/DL (ref 0.76–1.27)
DEPRECATED RDW RBC AUTO: 45 FL (ref 37–54)
EGFRCR SERPLBLD CKD-EPI 2021: 103.3 ML/MIN/1.73
EOSINOPHIL # BLD AUTO: 0.14 10*3/MM3 (ref 0–0.4)
EOSINOPHIL NFR BLD AUTO: 1.9 % (ref 0.3–6.2)
ERYTHROCYTE [DISTWIDTH] IN BLOOD BY AUTOMATED COUNT: 12.7 % (ref 12.3–15.4)
GLOBULIN UR ELPH-MCNC: 4.4 GM/DL
GLUCOSE SERPL-MCNC: 96 MG/DL (ref 65–99)
HCT VFR BLD AUTO: 39.5 % (ref 37.5–51)
HGB BLD-MCNC: 12.5 G/DL (ref 13–17.7)
IMM GRANULOCYTES # BLD AUTO: 0.07 10*3/MM3 (ref 0–0.05)
IMM GRANULOCYTES NFR BLD AUTO: 0.9 % (ref 0–0.5)
INR PPP: 1.16 (ref 0.84–1.13)
LYMPHOCYTES # BLD AUTO: 0.81 10*3/MM3 (ref 0.7–3.1)
LYMPHOCYTES NFR BLD AUTO: 10.8 % (ref 19.6–45.3)
MAGNESIUM SERPL-MCNC: 1.8 MG/DL (ref 1.6–2.4)
MCH RBC QN AUTO: 30.4 PG (ref 26.6–33)
MCHC RBC AUTO-ENTMCNC: 31.6 G/DL (ref 31.5–35.7)
MCV RBC AUTO: 96.1 FL (ref 79–97)
MONOCYTES # BLD AUTO: 0.54 10*3/MM3 (ref 0.1–0.9)
MONOCYTES NFR BLD AUTO: 7.2 % (ref 5–12)
MRSA DNA SPEC QL NAA+PROBE: NEGATIVE
NEUTROPHILS NFR BLD AUTO: 5.9 10*3/MM3 (ref 1.7–7)
NEUTROPHILS NFR BLD AUTO: 78.9 % (ref 42.7–76)
NRBC BLD AUTO-RTO: 0 /100 WBC (ref 0–0.2)
PLATELET # BLD AUTO: 202 10*3/MM3 (ref 140–450)
PMV BLD AUTO: 10.5 FL (ref 6–12)
POTASSIUM SERPL-SCNC: 3.8 MMOL/L (ref 3.5–5.2)
PROT SERPL-MCNC: 7.3 G/DL (ref 6–8.5)
PROTHROMBIN TIME: 14.8 SECONDS (ref 11.4–14.4)
RBC # BLD AUTO: 4.11 10*6/MM3 (ref 4.14–5.8)
SODIUM SERPL-SCNC: 137 MMOL/L (ref 136–145)
UFH PPP CHRO-ACNC: 0.1 IU/ML (ref 0.3–0.7)
UFH PPP CHRO-ACNC: 0.18 IU/ML (ref 0.3–0.7)
UFH PPP CHRO-ACNC: 0.24 IU/ML (ref 0.3–0.7)
WBC NRBC COR # BLD: 7.48 10*3/MM3 (ref 3.4–10.8)

## 2022-12-17 PROCEDURE — 71045 X-RAY EXAM CHEST 1 VIEW: CPT

## 2022-12-17 PROCEDURE — 87070 CULTURE OTHR SPECIMN AEROBIC: CPT | Performed by: PHYSICIAN ASSISTANT

## 2022-12-17 PROCEDURE — 94799 UNLISTED PULMONARY SVC/PX: CPT

## 2022-12-17 PROCEDURE — 25010000002 HEPARIN (PORCINE) PER 1000 UNITS: Performed by: INTERNAL MEDICINE

## 2022-12-17 PROCEDURE — 85730 THROMBOPLASTIN TIME PARTIAL: CPT | Performed by: INTERNAL MEDICINE

## 2022-12-17 PROCEDURE — 80053 COMPREHEN METABOLIC PANEL: CPT | Performed by: INTERNAL MEDICINE

## 2022-12-17 PROCEDURE — 85520 HEPARIN ASSAY: CPT | Performed by: INTERNAL MEDICINE

## 2022-12-17 PROCEDURE — 83735 ASSAY OF MAGNESIUM: CPT | Performed by: INTERNAL MEDICINE

## 2022-12-17 PROCEDURE — 25010000002 HEPARIN (PORCINE) PER 1000 UNITS

## 2022-12-17 PROCEDURE — 85025 COMPLETE CBC W/AUTO DIFF WBC: CPT | Performed by: INTERNAL MEDICINE

## 2022-12-17 PROCEDURE — 87070 CULTURE OTHR SPECIMN AEROBIC: CPT | Performed by: NURSE PRACTITIONER

## 2022-12-17 PROCEDURE — 25010000002 VANCOMYCIN 10 G RECONSTITUTED SOLUTION: Performed by: PHYSICIAN ASSISTANT

## 2022-12-17 PROCEDURE — 85520 HEPARIN ASSAY: CPT

## 2022-12-17 PROCEDURE — 87205 SMEAR GRAM STAIN: CPT | Performed by: PHYSICIAN ASSISTANT

## 2022-12-17 PROCEDURE — 99232 SBSQ HOSP IP/OBS MODERATE 35: CPT | Performed by: INTERNAL MEDICINE

## 2022-12-17 PROCEDURE — 87076 CULTURE ANAEROBE IDENT EACH: CPT | Performed by: PHYSICIAN ASSISTANT

## 2022-12-17 PROCEDURE — 0WJ60ZZ INSPECTION OF NECK, OPEN APPROACH: ICD-10-PCS | Performed by: OTOLARYNGOLOGY

## 2022-12-17 PROCEDURE — 87205 SMEAR GRAM STAIN: CPT | Performed by: NURSE PRACTITIONER

## 2022-12-17 PROCEDURE — 25010000002 PIPERACILLIN SOD-TAZOBACTAM PER 1 G: Performed by: INTERNAL MEDICINE

## 2022-12-17 PROCEDURE — 87641 MR-STAPH DNA AMP PROBE: CPT

## 2022-12-17 PROCEDURE — 87076 CULTURE ANAEROBE IDENT EACH: CPT | Performed by: NURSE PRACTITIONER

## 2022-12-17 PROCEDURE — 25010000002 HEPARIN (PORCINE) 25000-0.45 UT/250ML-% SOLUTION

## 2022-12-17 PROCEDURE — 94640 AIRWAY INHALATION TREATMENT: CPT

## 2022-12-17 PROCEDURE — 25010000002 HEPARIN (PORCINE) 25000-0.45 UT/250ML-% SOLUTION: Performed by: INTERNAL MEDICINE

## 2022-12-17 PROCEDURE — 85610 PROTHROMBIN TIME: CPT | Performed by: INTERNAL MEDICINE

## 2022-12-17 PROCEDURE — 25010000002 VANCOMYCIN PER 500 MG: Performed by: INTERNAL MEDICINE

## 2022-12-17 RX ORDER — HYDROCODONE BITARTRATE AND ACETAMINOPHEN 5; 325 MG/1; MG/1
1 TABLET ORAL EVERY 4 HOURS PRN
Status: DISCONTINUED | OUTPATIENT
Start: 2022-12-17 | End: 2022-12-21 | Stop reason: HOSPADM

## 2022-12-17 RX ORDER — ACETAMINOPHEN 325 MG/1
650 TABLET ORAL EVERY 4 HOURS PRN
Status: DISCONTINUED | OUTPATIENT
Start: 2022-12-17 | End: 2022-12-21 | Stop reason: HOSPADM

## 2022-12-17 RX ORDER — AMINO ACIDS/PROTEIN HYDROLYS 11G-40/45
30 LIQUID IN PACKET (ML) ORAL DAILY
Status: DISCONTINUED | OUTPATIENT
Start: 2022-12-17 | End: 2022-12-21 | Stop reason: HOSPADM

## 2022-12-17 RX ORDER — HEPARIN SODIUM 1000 [USP'U]/ML
25 INJECTION, SOLUTION INTRAVENOUS; SUBCUTANEOUS ONCE
Status: DISCONTINUED | OUTPATIENT
Start: 2022-12-17 | End: 2022-12-17

## 2022-12-17 RX ORDER — LEVOTHYROXINE SODIUM 0.07 MG/1
75 TABLET ORAL DAILY
Status: DISCONTINUED | OUTPATIENT
Start: 2022-12-18 | End: 2022-12-21 | Stop reason: HOSPADM

## 2022-12-17 RX ORDER — CHOLECALCIFEROL (VITAMIN D3) 125 MCG
5 CAPSULE ORAL NIGHTLY PRN
Status: DISCONTINUED | OUTPATIENT
Start: 2022-12-17 | End: 2022-12-19

## 2022-12-17 RX ORDER — GUAR GUM
1 PACKET (EA) ORAL DAILY
Status: DISCONTINUED | OUTPATIENT
Start: 2022-12-17 | End: 2022-12-21 | Stop reason: HOSPADM

## 2022-12-17 RX ORDER — HEPARIN SODIUM 1000 [USP'U]/ML
25 INJECTION, SOLUTION INTRAVENOUS; SUBCUTANEOUS ONCE
Status: COMPLETED | OUTPATIENT
Start: 2022-12-17 | End: 2022-12-17

## 2022-12-17 RX ORDER — ACETAMINOPHEN 160 MG/5ML
650 SOLUTION ORAL EVERY 4 HOURS PRN
Status: DISCONTINUED | OUTPATIENT
Start: 2022-12-17 | End: 2022-12-21 | Stop reason: HOSPADM

## 2022-12-17 RX ADMIN — Medication 10 ML: at 20:06

## 2022-12-17 RX ADMIN — VANCOMYCIN HYDROCHLORIDE 750 MG: 750 INJECTION, SOLUTION INTRAVENOUS at 12:27

## 2022-12-17 RX ADMIN — ALBUTEROL SULFATE 2.5 MG: 2.5 SOLUTION RESPIRATORY (INHALATION) at 12:46

## 2022-12-17 RX ADMIN — HEPARIN SODIUM 1860 UNITS: 1000 INJECTION INTRAVENOUS; SUBCUTANEOUS at 10:00

## 2022-12-17 RX ADMIN — HEPARIN SODIUM 18 UNITS/KG/HR: 10000 INJECTION, SOLUTION INTRAVENOUS at 00:39

## 2022-12-17 RX ADMIN — Medication 10 ML: at 00:04

## 2022-12-17 RX ADMIN — TAZOBACTAM SODIUM AND PIPERACILLIN SODIUM 3.38 G: 375; 3 INJECTION, SOLUTION INTRAVENOUS at 05:13

## 2022-12-17 RX ADMIN — HEPARIN SODIUM 21 UNITS/KG/HR: 10000 INJECTION, SOLUTION INTRAVENOUS at 17:43

## 2022-12-17 RX ADMIN — ACETAMINOPHEN 650 MG: 160 SOLUTION ORAL at 17:44

## 2022-12-17 RX ADMIN — TAZOBACTAM SODIUM AND PIPERACILLIN SODIUM 3.38 G: 375; 3 INJECTION, SOLUTION INTRAVENOUS at 12:27

## 2022-12-17 RX ADMIN — HEPARIN SODIUM 5950 UNITS: 1000 INJECTION, SOLUTION INTRAVENOUS; SUBCUTANEOUS at 00:38

## 2022-12-17 RX ADMIN — TAZOBACTAM SODIUM AND PIPERACILLIN SODIUM 3.38 G: 375; 3 INJECTION, SOLUTION INTRAVENOUS at 20:06

## 2022-12-17 RX ADMIN — Medication 1500 MG: at 00:01

## 2022-12-17 NOTE — ED PROVIDER NOTES
[place]    EMERGENCY DEPARTMENT ENCOUNTER      Pt Name: Lucian Bermudez  MRN: [id number]  YOB: 1950  Date of evaluation: 12/16/2022  Provider: HAI Morrison    CHIEF COMPLAINT       Chief Complaint   Patient presents with   • Neck Pain   • Insect Bite         HISTORY OF PRESENT ILLNESS  (Location/Symptom, Timing/Onset, Context/Setting, Quality, Duration, Modifying Factors, Severity.)   Lucian Bermudez is a 72 y.o. male who presents to the emergency department with complaints of an area of swelling and discharge at the base of his right neck.  Patient shares that approximately 2 months ago he had an area on his neck that he he and his wife thought was a spider bite.  He shares that since then he has had some tenderness there but that over the last 5 days he has began to feel stiff in his neck and into his shoulder on the right side.  He was seen and evaluated at his primary care physician today, Dr. Simmons.  While at his primary care's office he shares that they attempted to drain the area which had a significant amount of purulent drainage.  It was then recommended that patient present to the ER for further evaluation.  Patient denies any specific aggravating relieving factors.  He does report associated symptoms of fevers and chills on Monday night.  Patient has past medical history significant for laryngeal cancer and shares that he uses a feeding tube.    HPI   Nursing notes were reviewed.    REVIEW OF SYSTEMS    (2-9 systems for level 4, 10 or more for level 5)   Review of Systems   Constitutional: Positive for chills, fatigue and fever. Negative for activity change.   HENT: Positive for voice change.    Respiratory: Negative.    Cardiovascular: Negative.    Gastrointestinal: Negative.    Genitourinary: Negative.    Musculoskeletal: Positive for neck pain and neck stiffness.   Skin: Positive for wound.   Neurological: Negative.         All systems reviewed and negative except for those  discussed in HPI.   PAST MEDICAL HISTORY     Past Medical History:   Diagnosis Date   • Cancer (HCC)    • H/O colonoscopy    • Hypothyroidism          SURGICAL HISTORY       Past Surgical History:   Procedure Laterality Date   • CATARACT EXTRACTION     • COLONOSCOPY     • LYMPH NODE DISSECTION     • PEG TUBE INSERTION  2000   • PEG TUBE REPLACEMENT  2016         CURRENT MEDICATIONS       Current Facility-Administered Medications:   •  acetaminophen (TYLENOL) tablet 650 mg, 650 mg, Oral, Q4H PRN, Jalil Smith III, DO  •  albuterol (PROVENTIL) nebulizer solution 0.083% 2.5 mg/3mL, 2.5 mg, Nebulization, Q4H PRN, Jalil Smith III, DO  •  heparin 83361 units/250 mL (100 units/mL) in 0.45 % NaCl infusion, 18 Units/kg/hr, Intravenous, Titrated, Roman Morin, DO, Last Rate: 13.39 mL/hr at 12/17/22 0039, 18 Units/kg/hr at 12/17/22 0039  •  HYDROcodone-acetaminophen (NORCO) 5-325 MG per tablet 1 tablet, 1 tablet, Oral, Q4H PRN, Jalil Smith III, DO  •  levothyroxine (SYNTHROID, LEVOTHROID) tablet 75 mcg, 75 mcg, Oral, Daily, Jalil Smith III, DO  •  morphine injection 1 mg, 1 mg, Intravenous, Q4H PRN **AND** naloxone (NARCAN) injection 0.4 mg, 0.4 mg, Intravenous, Q5 Min PRN, Jalil Smith III, DO  •  ondansetron (ZOFRAN) injection 4 mg, 4 mg, Intravenous, Q6H PRN, Jalil Smith III, DO  •  Pharmacy to Dose Heparin, , Does not apply, Continuous PRN, Roman Morin, DO  •  Pharmacy to dose vancomycin, , Does not apply, Continuous PRN, Roman Morin, DO  •  piperacillin-tazobactam (ZOSYN) 3.375 g in iso-osmotic dextrose 50 ml (premix), 3.375 g, Intravenous, Q8H, Roman Morin, DO  •  sodium chloride 0.9 % flush 10 mL, 10 mL, Intravenous, Q12H, Jalil Smith III, DO, 10 mL at 12/17/22 0004  •  sodium chloride 0.9 % flush 10 mL, 10 mL, Intravenous, PRN, Jalil Smith III, DO  •  sodium chloride 0.9 % infusion 40 mL, 40 mL, Intravenous, PRN,  Jalil Smith III, DO  •  vancomycin in dextrose 5% 150 mL (VANCOCIN) IVPB 750 mg, 750 mg, Intravenous, Q12H, Roman Morin, DO    ALLERGIES     Patient has no known allergies.    FAMILY HISTORY       Family History   Problem Relation Age of Onset   • Cancer Mother    • Lung cancer Father    • Cancer Father    • Melanoma Sister    • No Known Problems Son    • No Known Problems Maternal Grandmother    • No Known Problems Maternal Grandfather    • No Known Problems Paternal Grandmother    • No Known Problems Paternal Grandfather           SOCIAL HISTORY       Social History     Socioeconomic History   • Marital status:    Tobacco Use   • Smoking status: Never   • Smokeless tobacco: Never   Vaping Use   • Vaping Use: Never used   Substance and Sexual Activity   • Alcohol use: Not Currently     Comment: occasional   • Drug use: Never   • Sexual activity: Yes     Partners: Female         PHYSICAL EXAM    (up to 7 for level 4, 8 or more for level 5)   Physical Exam  Vitals and nursing note reviewed.   Constitutional:       General: He is not in acute distress.     Appearance: Normal appearance. He is well-developed. He is not ill-appearing or toxic-appearing.   HENT:      Head: Normocephalic and atraumatic.      Nose: Nose normal.      Mouth/Throat:      Mouth: Mucous membranes are moist.   Eyes:      Extraocular Movements: Extraocular movements intact.   Neck:     Cardiovascular:      Rate and Rhythm: Normal rate.      Pulses: Normal pulses.   Pulmonary:      Effort: Pulmonary effort is normal. No respiratory distress.   Abdominal:      General: There is no distension.   Musculoskeletal:         General: No deformity. Normal range of motion.   Skin:     General: Skin is warm and dry.   Neurological:      General: No focal deficit present.      Mental Status: He is alert.   Psychiatric:         Behavior: Behavior normal.         Thought Content: Thought content normal.         Judgment: Judgment normal.           DIAGNOSTIC RESULTS     EKG: All EKGs are interpreted by the Emergency Department Physician who either signs or Co-signs this chart in the absence of a cardiologist.    No orders to display       RADIOLOGY:   Non-plain film images such as CT, Ultrasound and MRI are read by the radiologist. Plain radiographic images are visualized and preliminarily interpreted by the emergency physician with the below findings:      [x] Radiologist's Report Reviewed:  CT Soft Tissue Neck With Contrast   Final Result   Addendum (preliminary) 1 of 1   Findings of neck abscess and poor visualization of the right internal   jugular vein concerning for compression or thrombosis discussed with HAI MARTINI by Dr. Sony Paulson via telephone on 12/16/2022 8:50   PM.       This report was finalized on 12/16/2022 8:50 PM by Sony Paulson.          Final   Rim-enhancing fluid collection in the right base of neck soft tissues   concerning for abscess with soft tissue stranding and inflammatory   changes extending all along the right neck. No evidence of deep soft   tissue fluid collection.       Poor visualization of the right internal jugular vein which may be   compressed or thrombosed.       Frothy material seen within the hypopharynx which places the patient at   risk for aspiration.       Diffuse scattered groundglass opacities throughout the biapical lungs   may represent infection       This report was finalized on 12/16/2022 8:42 PM by Sony Paulson.                ED BEDSIDE ULTRASOUND:   Performed by ED Physician - none    LABS:    I have reviewed and interpreted all of the currently available lab results from this visit (if applicable):  Results for orders placed or performed during the hospital encounter of 12/16/22   Comprehensive Metabolic Panel    Specimen: Blood   Result Value Ref Range    Glucose 108 (H) 65 - 99 mg/dL    BUN 29 (H) 8 - 23 mg/dL    Creatinine 0.77 0.76 - 1.27 mg/dL    Sodium 138 136 - 145  mmol/L    Potassium 4.0 3.5 - 5.2 mmol/L    Chloride 96 (L) 98 - 107 mmol/L    CO2 32.0 (H) 22.0 - 29.0 mmol/L    Calcium 9.9 8.6 - 10.5 mg/dL    Total Protein 8.5 6.0 - 8.5 g/dL    Albumin 3.90 3.50 - 5.20 g/dL    ALT (SGPT) 19 1 - 41 U/L    AST (SGOT) 28 1 - 40 U/L    Alkaline Phosphatase 90 39 - 117 U/L    Total Bilirubin 1.1 0.0 - 1.2 mg/dL    Globulin 4.6 gm/dL    A/G Ratio 0.8 g/dL    BUN/Creatinine Ratio 37.7 (H) 7.0 - 25.0    Anion Gap 10.0 5.0 - 15.0 mmol/L    eGFR 96.3 >60.0 mL/min/1.73   Lactic Acid, Plasma    Specimen: Blood   Result Value Ref Range    Lactate 0.9 0.5 - 2.0 mmol/L   CBC Auto Differential    Specimen: Blood   Result Value Ref Range    WBC 8.98 3.40 - 10.80 10*3/mm3    RBC 4.67 4.14 - 5.80 10*6/mm3    Hemoglobin 14.1 13.0 - 17.7 g/dL    Hematocrit 45.1 37.5 - 51.0 %    MCV 96.6 79.0 - 97.0 fL    MCH 30.2 26.6 - 33.0 pg    MCHC 31.3 (L) 31.5 - 35.7 g/dL    RDW 12.7 12.3 - 15.4 %    RDW-SD 45.1 37.0 - 54.0 fl    MPV 10.5 6.0 - 12.0 fL    Platelets 227 140 - 450 10*3/mm3    Neutrophil % 79.3 (H) 42.7 - 76.0 %    Lymphocyte % 12.0 (L) 19.6 - 45.3 %    Monocyte % 6.9 5.0 - 12.0 %    Eosinophil % 1.3 0.3 - 6.2 %    Basophil % 0.2 0.0 - 1.5 %    Immature Grans % 0.3 0.0 - 0.5 %    Neutrophils, Absolute 7.11 (H) 1.70 - 7.00 10*3/mm3    Lymphocytes, Absolute 1.08 0.70 - 3.10 10*3/mm3    Monocytes, Absolute 0.62 0.10 - 0.90 10*3/mm3    Eosinophils, Absolute 0.12 0.00 - 0.40 10*3/mm3    Basophils, Absolute 0.02 0.00 - 0.20 10*3/mm3    Immature Grans, Absolute 0.03 0.00 - 0.05 10*3/mm3    nRBC 0.0 0.0 - 0.2 /100 WBC   Heparin Anti-Xa    Specimen: Blood   Result Value Ref Range    Heparin Anti-Xa (UFH) 0.10 (L) 0.30 - 0.70 IU/ml   Protime-INR    Specimen: Blood   Result Value Ref Range    Protime 14.8 (H) 11.4 - 14.4 Seconds    INR 1.16 (H) 0.84 - 1.13   aPTT    Specimen: Blood   Result Value Ref Range    PTT 38.7 (L) 60.0 - 90.0 seconds   C-reactive Protein    Specimen: Blood   Result Value Ref Range  "   C-Reactive Protein 12.45 (H) 0.00 - 0.50 mg/dL   Sedimentation Rate    Specimen: Blood   Result Value Ref Range    Sed Rate >130 (H) 0 - 20 mm/hr   Procalcitonin    Specimen: Blood   Result Value Ref Range    Procalcitonin 0.08 0.00 - 0.25 ng/mL   POC Creatinine    Specimen: Blood   Result Value Ref Range    Creatinine 0.80 mg/dL   POC Creatinine    Specimen: Blood   Result Value Ref Range    Creatinine 0.80 0.60 - 1.30 mg/dL        All other labs were within normal range or not returned as of this dictation.      EMERGENCY DEPARTMENT COURSE and DIFFERENTIAL DIAGNOSIS/MDM:   Vitals:    Vitals:    12/16/22 2130 12/16/22 2235 12/17/22 0022 12/17/22 0338   BP: 175/94 132/90 115/72 138/90   BP Location: Right arm Right arm Right arm Right arm   Patient Position: Lying Lying Lying Lying   Pulse: 95 86 56 76   Resp: 18 18 18 18   Temp:  98.3 °F (36.8 °C) 98 °F (36.7 °C) 98 °F (36.7 °C)   TempSrc:  Oral Oral Oral   SpO2: 98% 93%     Weight:       Height:  182.9 cm (72\")         ED Course as of 12/17/22 0412   Fri Dec 16, 2022   2102 Case reviewed with Dr. Kelly and based on findings and location did not recommend I&D in ER. Recommends consult to ENT [JG]   2109 I have consulted Dr. Langford with ENT who recommends admission to the hospitalist and he will see and evaluate patient in the morning. [JG]   Sat Dec 17, 2022   0406 In summary this is a 72-year-old male who presents to the ER for evaluation of a wound to the base of his right neck.  Differential diagnosis includes cyst versus abscess versus cellulitis or tumor or other.  Clinical findings consistent with abscess.  Seen and evaluated outpatient with wound expressed and purulent drainage actively draining from wound.  CRP and sed rate elevated, remainder labs without acute or emergent abnormalities.  CT neck soft tissue demonstrates findings confirming neck abscess and poor visualization of the right internal  jugular vein concerning for compression or thrombosis. "  ENT consulted with recommendations implemented as documented.  Blood and wound cultures obtained.  Patient initiated on broad-spectrum antibiotics.  Hospitalist consulted for admission agreeable accept patient.  Patient to be agreeable to plan of care and hospital admission for further evaluation and treatment.  At time of admission disposition patient was resting in position of comfort, no acute distress, vital signs stable and maintaining oxygen saturation at 98% on room air. [JG]      ED Course User Index  [JG] Farooq Chaney PA       MDM  Number of Diagnoses or Management Options  Cellulitis and abscess of neck: established, worsening     Amount and/or Complexity of Data Reviewed  Clinical lab tests: reviewed  Tests in the radiology section of CPT®: reviewed  Discuss the patient with other providers: yes    Risk of Complications, Morbidity, and/or Mortality  Presenting problems: high  Diagnostic procedures: high  Management options: high    Patient Progress  Patient progress: stable         MEDICATIONS ADMINISTERED IN ED:  Medications   heparin 52841 units/250 mL (100 units/mL) in 0.45 % NaCl infusion ( Intravenous Canceled Entry 12/17/22 0047)   Pharmacy to Dose Heparin (has no administration in time range)   Pharmacy to dose vancomycin (has no administration in time range)   piperacillin-tazobactam (ZOSYN) 3.375 g in iso-osmotic dextrose 50 ml (premix) (has no administration in time range)   sodium chloride 0.9 % flush 10 mL (10 mL Intravenous Given 12/17/22 0004)   sodium chloride 0.9 % flush 10 mL (has no administration in time range)   sodium chloride 0.9 % infusion 40 mL (has no administration in time range)   acetaminophen (TYLENOL) tablet 650 mg (has no administration in time range)   HYDROcodone-acetaminophen (NORCO) 5-325 MG per tablet 1 tablet (has no administration in time range)   morphine injection 1 mg (has no administration in time range)     And   naloxone (NARCAN) injection 0.4 mg (has no  administration in time range)   ondansetron (ZOFRAN) injection 4 mg (has no administration in time range)   vancomycin in dextrose 5% 150 mL (VANCOCIN) IVPB 750 mg (has no administration in time range)   levothyroxine (SYNTHROID, LEVOTHROID) tablet 75 mcg (has no administration in time range)   albuterol (PROVENTIL) nebulizer solution 0.083% 2.5 mg/3mL (has no administration in time range)   iopamidol (ISOVUE-300) 61 % injection 100 mL (80 mL Intravenous Given 12/16/22 2019)   vancomycin 1500 mg/500 mL 0.9% NS IVPB ([facility]) (1,500 mg Intravenous New Bag 12/17/22 0001)   heparin (porcine) injection 5,950 Units (5,950 Units Intravenous Given 12/17/22 0038)   piperacillin-tazobactam (ZOSYN) 3.375 g in iso-osmotic dextrose 50 ml (premix) (3.375 g Intravenous New Bag 12/16/22 2146)       PROCEDURES:  Procedures          CRITICAL CARE TIME    Total Critical Care time was 0 minutes, excluding separately reportable procedures.   There was a high probability of clinically significant/life threatening deterioration in the patient's condition which required my urgent intervention.      FINAL IMPRESSION      1. Cellulitis and abscess of neck          DISPOSITION/PLAN     ED Disposition     ED Disposition   Decision to Admit    Condition   --    Comment   Level of Care: Telemetry [5]   Diagnosis: Abscess [016690]   Admitting Physician: MARIYA RICHEY III [[id number]]   Attending Physician: MARIYA RICHEY III [[id number]]   Bed Request Comments: inpt tele               Comment: Please note this report has been produced using speech recognition software.      HAI Morrison Jason C, PA  12/17/22 0410

## 2022-12-17 NOTE — CONSULTS
Clinical Nutrition     Nutrition Support Assessment  Reason for Visit: Physician consult, EN      Patient Name: Lucian Bermudez  YOB: 1950  MRN: [id number]  Date of Encounter: 12/17/22 09:25 EST  Admission date: 12/16/2022    Comments:    Pt meets criteria for chronic non-severe malnutrition with the indicators of mild/moderate muscle wasting and subcutaneous fat loss. Pt weight remaining stable with VHC nutrition through PEG; however unable to gain weight 2/2 high nutrition needs.      Pt followed by Dr Valentino and Oncology RDs at [facility]. Pt previously doing 5-6 cans of Boost VHC via bolus with ~200mL before and after each feeding. Pt reported feeling bloated with high volume and has since changed to 4-5 bolus daily, continues with some concerns of intolerance (bloating). Takes Benefiber QD at home. Discussed possibility of changing formula to help decrease total volume, current regimen is best to meet protein-energy needs.     EN recommendation:    EN: Boost VHC  Goal Rate: 237mL q bolus Water Flushes: 300mL q bolus  Modular: Prosource -no carb 1/day; Nutrisource fiber 1x/d  Route: PEG  Tube: Unknown    At goal over: 22Hrs/day    Rx will supply:   Goal Volume 948 mL/day     Flush Volume 1200 mL/day     Energy 2180 Kcal/day 104 % Est Need   Protein 103 g/day 89 % Est Need   Fiber 3 g/day     Water in   mL     Total Water 1854 mL     Meet DRI Yes             Nutrition Assessment   Admission Diagnosis:  Cellulitis and abscess of neck [L03.221, L02.11]  Abscess [L02.91]    Problem List:    Cellulitis and abscess of neck    Abscess      PMH:   He  has a past medical history of Cancer (HCC), H/O colonoscopy, and Hypothyroidism.      PSH:  He  has a past surgical history that includes Colonoscopy; Cataract extraction; Lymph node dissection; Peg Tube Insertion (2000); and Peg Tube Replacement (2016).    Applicable Nutrition Concerns:   Skin:  Oral:  GI: PEG    Applicable Interval  "History:         Reported/Observed/Food/Nutrition Related History:     Pt with long term dysphagia 2/2 head/nech cancer followed by RDs at [facility]. Pt has been using Boost VHC to maintain weight - originally using ~6 can daily but had significant N/V and bloating. Currently uses ~4 cans daily and has used this regimen for ~1yr - discussed possibility of changing regimen to decrease volume but after evaluation this will be best option for pt. Currently has many cases. Reports being very active. Remains NPO. Endorsed inconsistent bowels and takes benefiber daily at home in addition to Vit C 1g. Last bolus at 2pm yesterday. Endorsed occasional vomiting following coughing fits.      Labs    Labs Reviewed: Yes   Results from last 7 days   Lab Units 12/17/22  0732 12/16/22 1933 12/16/22  1931   GLUCOSE mg/dL 96  --  108*   BUN mg/dL 22  --  29*   CREATININE mg/dL 0.61* 0.80 0.80  0.77   SODIUM mmol/L 137  --  138   CHLORIDE mmol/L 99  --  96*   POTASSIUM mmol/L 3.8  --  4.0   MAGNESIUM mg/dL 1.8  --   --    ALT (SGPT) U/L 14  --  19       Results from last 7 days   Lab Units 12/17/22  0732 12/16/22 1931   ALBUMIN g/dL 2.90* 3.90   CRP mg/dL  --  12.45*       Medications    Medications Reviewed: Yes  Scheduled: heparin, synthroid, Abx,   GTT: heparin  PRN:    Intake/Ouptut 24 hrs (7:00AM - 6:59 AM)   I/Os Reviewed: Yes       Anthropometrics     Flowsheet Rows    Flowsheet Row First Filed Value   Admission Height 182.9 cm (72\") Documented at 12/16/2022 1635   Admission Weight 74.4 kg (164 lb) Documented at 12/16/2022 1635            Height: Height: 182.9 cm (72\")  Last Filed Weight: Weight: 74.4 kg (164 lb) (12/16/22 1635)  Method: Weight Method: Stated  BMI: BMI (Calculated): 22.2  BMI classification: Normal: 18.5-24.9kg/m2  IBW:  171lbs    UBW:     Last 15 Recorded Weights  View Complete Flowsheet  Weight Weight (kg) Weight (lbs) Weight Method VISIT REPORT   12/16/2022 74.39 kg 164 lb Stated Report   12/16/2022 73.936 " "kg 163 lb - Report   11/10/2022 75.479 kg 166 lb 6.4 oz - Report   12/9/2021 76.204 kg 168 lb - Report   5/10/2021 75.569 kg 166 lb 9.6 oz - Report   12/16/2020 71.668 kg 158 lb - Report   7/23/2020 74.39 kg 164 lb - Report   6/19/2019 79.4 kg 175 lb 0.7 oz - Report   6/5/2019 79.4 kg 175 lb 0.7 oz - Report   5/8/2019 79.4 kg 175 lb 0.7 oz - Report   4/25/2019 79.379 kg 175 lb - Report   4/17/2019 77.565 kg 171 lb - Report   8/31/2018 83.462 kg 184 lb - Report   4/18/2017 80.74 kg 178 lb - Report   10/11/2016 80.74 kg 178 lb - Report     Weight change:   No documented weight change    Nutrition Focused Physical Exam     Date: 12/17    Patient meets criteria for malnutrition diagnosis for mild/moderate wasting/fat loss, see MSA note.    Needs Assessment   Date: 12/17    Height used:Height: 182.9 cm (72\")  Weights used: Weight: 74.4 kg (164 lb) (12/16/22 1635)  IBW:  78kg       Estimated calorie needs: ~ 2100 Kcal/day  Method:Kcals/KG 25-30 = 7670-5455  Method:MSJ x 1.3 = 2000    Estimated protein needs: ~ 115g PRO/day  Method: 1.3-1.5g/Kg IBW = 105-122     Estimated fluid needs: ~ ml/day   Per clinical status      Current Nutrition Prescription     PO: NPO Diet NPO Type: Sips with Meds, Ice Chips  Oral Nutrition Supplement:   Intake: Insufficient data       Nutrition Diagnosis   Date: 12/17 Updated:   Problem Malnutrition non-severe   Etiology Inadequate protein energy intake in the context of chronic illness   Signs/Symptoms Mild/moderate muscle and subcutaneous fat loss   Status:     Date:  12/17 Updated:  Problem Swallowing difficulty   Etiology Head/neck cancer hx   Signs/Symptoms Long term PEG used   Status:    Goal:   General: Nutrition to support treatment  PO: Advace diet as medically feasible/appropriate  EN/PN: Initiate EN, Tolerate EN at goal, Deliver estimated needs    Nutrition Intervention   Follow treatment progress, Care plan reviewed, Nutrition support order placed      EN recommendation:    EN: " Boost VHC  Goal Rate: 237mL q bolus Water Flushes: 300mL q bolus  Modular: Prosource -no carb 1/day; Nutrisource fiber 1x/d  Route: PEG  Tube: Unknown    At goal over: 8am to 8pm    Rx will supply:   Goal Volume 948 mL/day     Flush Volume 1200 mL/day     Energy 2180 Kcal/day 104 % Est Need   Protein 103 g/day 89 % Est Need   Fiber 3 g/day     Water in   mL     Total Water 1854 mL     Meet DRI Yes        Provide Prosource 1x/daily  Provide Nutrisource 1x daily      Monitoring/Evaluation:   Per protocol, I&O, Pertinent labs, EN delivery/tolerance, Weight, Skin status, GI status, Symptoms      Radha Vidal, MS,RD,LD  Time Spent: 45min

## 2022-12-17 NOTE — CONSULTS
Reason for Consultation: Neck abscess    Patient Care Team:  Wei Simmons MD as PCP - General (Family Medicine)  Valentino, Joseph, MD as Consulting Physician (Otolaryngology)  Kilo Valle MD as Consulting Physician (Dermatology)  Jamey Swann MD as Consulting Physician (Orthopedic Surgery)  Manisha Funk MD as Consulting Physician (Dermatology)    Chief complaint neck pain    Subjective .     History of present illness: 72-year-old white male with 2-month history of intermittent pain and drainage from a lesion on the right neck.  Worsened over the last few days and was seen by primary care and sent to the ER.  Was admitted with IV antibiotics after CT showed a deep neck abscess in the right neck.  Has had no dyspnea.  Low-grade fever.  No otalgia.  No hemoptysis.  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  He has been cancer free since then.  He was treated at .    Review of Systems  Pertinent items are noted in HPI    History  Past Medical History:   Diagnosis Date   • Cancer (HCC)    • H/O colonoscopy    • Hypothyroidism    ,   Past Surgical History:   Procedure Laterality Date   • CATARACT EXTRACTION     • COLONOSCOPY     • LYMPH NODE DISSECTION     • PEG TUBE INSERTION  2000   • PEG TUBE REPLACEMENT  2016   ,   Family History   Problem Relation Age of Onset   • Cancer Mother    • Lung cancer Father    • Cancer Father    • Melanoma Sister    • No Known Problems Son    • No Known Problems Maternal Grandmother    • No Known Problems Maternal Grandfather    • No Known Problems Paternal Grandmother    • No Known Problems Paternal Grandfather    ,   Social History     Socioeconomic History   • Marital status:    Tobacco Use   • Smoking status: Never   • Smokeless tobacco: Never   Vaping Use   • Vaping Use: Never used   Substance and Sexual Activity   • Alcohol use: Not Currently     Comment:  occasional   • Drug use: Never   • Sexual activity: Yes     Partners: Female     E-cigarette/Vaping   • E-cigarette/Vaping Use Never User      E-cigarette/Vaping Substances     E-cigarette/Vaping Devices       ,   Medications Prior to Admission   Medication Sig Dispense Refill Last Dose   • albuterol (PROVENTIL) (2.5 MG/3ML) 0.083% nebulizer solution Take 2.5 mg by nebulization Every 4 (Four) Hours As Needed for Wheezing. 30 each 3    • levothyroxine (SYNTHROID, LEVOTHROID) 75 MCG tablet Take 1 tablet by mouth Daily.      • Nutritional Supplements (Boost Very High Calorie) liquid Administer 237 mL per G tube 5 (Five) Times a Day.      • sildenafil (VIAGRA) 100 MG tablet Take 1 tablet by mouth Daily As Needed for Erectile Dysfunction (Take 1/2 to 1 tablet as needed for ED). 30 tablet 3    , Scheduled Meds:  heparin (porcine), 25 Units/kg, Intravenous, Once  levothyroxine, 75 mcg, Oral, Daily  piperacillin-tazobactam, 3.375 g, Intravenous, Q8H  sodium chloride, 10 mL, Intravenous, Q12H  vancomycin, 750 mg, Intravenous, Q12H    , Continuous Infusions:  heparin, 21 Units/kg/hr, Last Rate: 18 Units/kg/hr (12/17/22 0039)  Pharmacy to Dose Heparin,   Pharmacy to dose vancomycin,     , PRN Meds:  •  acetaminophen  •  albuterol  •  HYDROcodone-acetaminophen  •  Morphine **AND** naloxone  •  ondansetron  •  Pharmacy to Dose Heparin  •  Pharmacy to dose vancomycin  •  sodium chloride  •  sodium chloride and Allergies:  Patient has no known allergies.    Objective     Vital Signs   Temp:  [97.6 °F (36.4 °C)-98.3 °F (36.8 °C)] 98 °F (36.7 °C)  Heart Rate:  [56-95] 76  Resp:  [16-18] 18  BP: (115-191)/() 138/90    Physical Exam:  Awake alert no distress  Abnormal voice with abnormal articulation  Significant trismus  No stridor  Head normocephalic  Extraocular movement intact  Auricles tympanic membrane's normal  Nasal dorsum normal  Palate normal.  Woody neck induration with right radical neck dissection scar  2 cm area of  granulation tissue along the right inferior neck  Thyroid not palpable  Cranial nerves intact  Facial nerve grade 1    Results Review:  Personally reviewed and interpreted the CT images      Assessment & Plan       Cellulitis and abscess of neck    Abscess      Neck abscess present in the right neck with a history of chemoradiation and neck dissection causing significant induration and scarring of the neck.  Grossly does not appear infected given the significant woody induration.  Internal jugular vein not visualized on imaging but this could be due to sacrifice from the neck dissection in the past.  I aspirated 2 cc of purulent material from the neck and sent for culture.  Opened further with a hemostat at the bedside and no further purulence obtained.  I recommend treating with culture directed antibiotics as proceeding to the operating room for a washout is complicated by his significant trismus and very difficult intubation.  Could consider washout in the OR if not improved with medical therapy over a few days    I discussed the patients findings and my recommendations with patient, family and consulting provider    Liban Langford MD  12/17/22  09:36 EST

## 2022-12-17 NOTE — H&P
[facility]  HISTORY AND PHYSICAL    Patient Name: Lucian Bermudez  : 1950  MRN: [id number]  Primary Care Physician: Wei Simmons MD  Date of admission: 2022      Subjective   Subjective     Chief Complaint:  Neck pain and abscess    HPI:  Lucian Bermudez is a 72 y.o. male who states that approximately 8 weeks ago he noticed a small bump on the right lateral aspect of his neck which he presumed was a spider bite; it has caused no issues until 3 days ago, at which time he began to increase in size, become red, and become more painful.  He went to his PCP today (Friday) where the area was manipulated and some purulent drainage began to come out.  Culture was obtained there and eventually returned Staph. aureus and Klebsiella.  The patient does confirm he had a fever as high as 99.7F on Monday but it has not recurred.  Also of note, the patient has history of throat cancer and is fed through feeding tube; he evidently has some frequent aspiration issues, sometimes awakening with cough after aspirating during the night.  He also confirms that since the area on the right side of his neck has increased in size, he has had some right shoulder pain as well as tightness on the right side of the neck and superior aspect of the right shoulder.  He denies chest pain, shortness of breath, nausea/emesis, abdominal pain, bowel habit change, focal neurologic deficit beyond baseline, or syncope.  He states aside from the aforementioned throat cancer, his medical history is significant only for hypothyroidism.      Review of Systems   Constitutional: Negative.    HENT: Negative.    Eyes: Negative.    Respiratory: Negative.    Cardiovascular: Negative.    Gastrointestinal: Negative.    Endocrine: Negative.    Genitourinary: Negative.    Musculoskeletal: Positive for neck pain.   Skin:        As in HPI   Allergic/Immunologic: Negative.    Neurological: Negative.     Hematological: Negative.    Psychiatric/Behavioral: Negative.           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 Otherwise pertinent FHx was reviewed and unremarkable.     Social History:  reports that he has never smoked. He has never used smokeless tobacco. He reports that he does not currently use alcohol. He reports that he does not use drugs.  Social History     Social History Narrative   • Not on file       Medications:  Available home medication information reviewed.  (Not in a hospital admission)      No Known Allergies    Objective   Objective     Vital Signs:   Temp:  [97.6 °F (36.4 °C)-98.1 °F (36.7 °C)] 98.1 °F (36.7 °C)  Heart Rate:  [81-95] 95  Resp:  [16-18] 18  BP: (130-191)/() 175/94       Physical Exam   Constitutional: Awake, alert, NAD, pleasant.  Eyes: PERRLA, sclerae anicteric, no conjunctival injection  HENT: NCAT, mucous membranes moist.   Neck: Supple, no thyromegaly, no lymphadenopathy, trachea midline.  See skin exam.  Respiratory: Clear to auscultation bilaterally, nonlabored respirations   Cardiovascular: RRR, no murmurs, rubs, or gallops, palpable pedal pulses bilaterally  Gastrointestinal: Positive bowel sounds, soft,  nontender, nondistended.  PEG insertion site C/D/I.  Musculoskeletal: No bilateral ankle edema, no clubbing or cyanosis to extremities  Psychiatric: Appropriate affect, cooperative  Neurologic: Oriented x 3, strength symmetric in all extremities, Cranial Nerves grossly intact to confrontation, speech clear  Skin: No rashes, normal turgor.  Right side of neck has a 2 x 1 cm ovoid raised lesion, some clear drainage currently, painful to palpation, mild cellulitic/hyperpigmented change surrounding, also single lymph node palpable in the right axilla.    Result Review:  I have personally reviewed the results from the time of this admission to 12/16/2022 22:04 EST and agree with these findings:  [x]  Laboratory list / accordion  []  Microbiology  [x]  Radiology  []  EKG/Telemetry   []  Cardiology/Vascular   []  Pathology  []  Old records  []  Other:  Most notable findings include: I reviewed all lab and radiology report data as delineated below.        LAB RESULTS:      Lab 12/16/22 1931   WBC 8.98   HEMOGLOBIN 14.1   HEMATOCRIT 45.1   PLATELETS 227   NEUTROS ABS 7.11*   IMMATURE GRANS (ABS) 0.03   LYMPHS ABS 1.08   MONOS ABS 0.62   EOS ABS 0.12   MCV 96.6   SED RATE >130*   CRP 12.45*   LACTATE 0.9         Lab 12/16/22 1933 12/16/22 1931   SODIUM  --  138   POTASSIUM  --  4.0   CHLORIDE  --  96*   CO2  --  32.0*   ANION GAP  --  10.0   BUN  --  29*   CREATININE 0.80 0.80  0.77   EGFR  --  96.3   GLUCOSE  --  108*   CALCIUM  --  9.9         Lab 12/16/22 1931   TOTAL PROTEIN 8.5   ALBUMIN 3.90   GLOBULIN 4.6   ALT (SGPT) 19   AST (SGOT) 28   BILIRUBIN 1.1   ALK PHOS 90                         Microbiology Results (last 10 days)     ** No results found for the last 240 hours. **          CT Soft Tissue Neck With Contrast    Addendum Date: 12/16/2022    Findings of neck abscess and poor visualization of the right internal jugular vein concerning for compression or thrombosis discussed with HAI MARTINI by Dr. Cardoza  Khurram via telephone on 12/16/2022 8:50 PM.  This report was finalized on 12/16/2022 8:50 PM by Sony Paulson.      Result Date: 12/16/2022  DATE OF EXAM: 12/16/2022 8:06 PM  PROCEDURE: CT SOFT TISSUE NECK W CONTRAST-  INDICATIONS: abscess/infetion right side at base of neck  COMPARISON: No comparisons available.  TECHNIQUE: After the intravenous administration of 80 mL of Isovue 300, contiguous axial images were obtained through the neck. Coronal and sagittal reconstructions were performed. Automated exposure control and iterative reconstruction methods were used.  The radiation dose reduction device was turned on for each scan per the ALARA (As Low as Reasonably Achievable) protocol.  FINDINGS: Soft tissues: Within the right base of neck soft tissues there is a rim-enhancing collection with surrounding soft tissue stranding. This collection measures approximately 6 x 3.7 x 2.4 cm. No evidence of large rim-enhancing collection in the deeper soft tissues.  Pharynx/airway: There is frothy material seen within the hypopharynx.  Major salivary glands: Salivary glands are overall diminutive in size.  Thyroid: Normal.  Lymph nodes: Scattered subcentimeter lymph nodes are present in the neck bilaterally, none of which is pathological by size or morphologic criteria.  Vessels: The visualized arteries appear patent. There is mild atherosclerotic narrowing of the arteries. The right internal jugular vein is not seen and may be thrombosed or compressed.  Bones: Degenerative changes of the cervical spine.  Lung apices: Diffuse scattered groundglass opacities throughout the biapical lungs may represent infectious or inflammatory process. Biapical pleural parenchymal scarring.  Visualized brain parenchyma: Normal.  Other: Left lens replacement.       Impression: Rim-enhancing fluid collection in the right base of neck soft tissues concerning for abscess with soft tissue stranding and inflammatory changes extending all  along the right neck. No evidence of deep soft tissue fluid collection.  Poor visualization of the right internal jugular vein which may be compressed or thrombosed.  Frothy material seen within the hypopharynx which places the patient at risk for aspiration.  Diffuse scattered groundglass opacities throughout the biapical lungs may represent infection  This report was finalized on 12/16/2022 8:42 PM by Sony Paulson.            Assessment & Plan   Assessment & Plan     Active Hospital Problems    Diagnosis  POA   • **Cellulitis and abscess of neck [L03.221, L02.11]  Yes   • Abscess [L02.91]  Yes       72M with neck abscess, possible thrombosis of right IJ vein    Neck abscess  Possible thrombosis of right internal jugular vein  - IV antibiotic coverage with vancomycin and Zosyn.  - Pain control with oral agents, IV if needed.  - Given the radiology report mention of possible right IJ vein thrombosis, will start heparin drip; ultrasound has been ordered to confirm this and heparin drip can be discontinued if no thrombus is appreciated on the study.  - ENT consult requested, input appreciated, will follow up their recommendations.    Bilateral upper lobe opacities  - Likely from frequent/chronic aspirations which patient states happens to him frequently; he will be on antibiotics as above with as needed DuoNeb treatments.  - No need for supplemental O2, as his saturations have been good on room air in the ED.    Hypothyroidism  - Continue Synthroid from home regimen.    History of throat cancer  - Continue boost/Ensure through PEG tube.    DVT prophylaxis:  On heparin drip      CODE STATUS:  full  Code Status and Medical Interventions:   Ordered at: 12/16/22 5790     Level Of Support Discussed With:    Patient     Code Status (Patient has no pulse and is not breathing):    CPR (Attempt to Resuscitate)     Medical Interventions (Patient has pulse or is breathing):    Full Support       Expected Discharge 12/18        Jalil Smith,III, DO  12/16/22

## 2022-12-17 NOTE — CONSULTS
INFECTIOUS DISEASE CONSULT/INITIAL HOSPITAL VISIT    Lucian Bermudez  1950  [id number]    Date of Consult: 12/17/2022    Admission Date: 12/16/2022      Requesting Provider: No ref. provider found  Evaluating Physician: Ricardo Tabor MD    Reason for Consultation: neck abscess, right    History of present illness:    Patient is a 72 y.o. male, with PMH throat cancer, s/p chemo/XRT/radical neck 2021, evaluated 12/17/2022 for a neck abscess on the right.  He developed the right neck lesion several months ago, which has been spontaneously draining off and on. He was seen by his PCP for a wellness check yesterday, and complained of neck soreness, tenderness, and was referred to the ED. They attempted to drain the lesion obtaining purulent drainage. He has been afebrile. Admitting labs with ESR >>130, CRP 12.45, WBC 8.98, lactate 0.9, Scr 0.77, MRSA PCR negative. CT neck with rim enhancing fluid collection in the right base of neck soft tissues concerning for abscess ( 6x3.7x2.4)  with soft tissue stranding and inflammatory changes extending all along the right neck.  No evidence of deep soft tissue fluid collection. Frothy material seen within the hypopharynx which places the patient at risk of aspiration , diffuse scattered groundglass opacities throughout the biapical lungs. Gram stain of wound with no WBCs, no organisms, culture pending. Currently on Vancomycin and Zosyn and we were consulted for evaluation and treatment on 12/17/2022. He did have a low grade fever Monday with chills. No increased sputum production, nausea, vomiting. Complains of neck soreness, extending down shoulder and arm.   Dr. Langford, ENT, has obtained another culture, and is going to attempt to open the lesion.  He has no other localizing signs or symptoms of infection.  He denies ill contacts, TB or HIV.    Past Medical History:   Diagnosis Date   • Cancer (HCC)    • H/O colonoscopy    • Hypothyroidism        Past Surgical  History:   Procedure Laterality Date   • CATARACT EXTRACTION     • COLONOSCOPY     • LYMPH NODE DISSECTION     • PEG TUBE INSERTION  2000   • PEG TUBE REPLACEMENT  2016       Family History   Problem Relation Age of Onset   • Cancer Mother    • Lung cancer Father    • Cancer Father    • Melanoma Sister    • No Known Problems Son    • No Known Problems Maternal Grandmother    • No Known Problems Maternal Grandfather    • No Known Problems Paternal Grandmother    • No Known Problems Paternal Grandfather        Social History     Socioeconomic History   • Marital status:    Tobacco Use   • Smoking status: Never   • Smokeless tobacco: Never   Vaping Use   • Vaping Use: Never used   Substance and Sexual Activity   • Alcohol use: Not Currently     Comment: occasional   • Drug use: Never   • Sexual activity: Yes     Partners: Female       No Known Allergies      Medication:    Current Facility-Administered Medications:   •  acetaminophen (TYLENOL) tablet 650 mg, 650 mg, Per G Tube, Q4H PRN, Keyla Yuen, DO  •  albuterol (PROVENTIL) nebulizer solution 0.083% 2.5 mg/3mL, 2.5 mg, Nebulization, Q4H PRN, Jalil Smith III, DO, 2.5 mg at 12/17/22 1246  •  heparin 84869 units/250 mL (100 units/mL) in 0.45 % NaCl infusion, 21 Units/kg/hr, Intravenous, Titrated, Troy Washington RPH, Last Rate: 15.62 mL/hr at 12/17/22 1001, 21 Units/kg/hr at 12/17/22 1001  •  HYDROcodone-acetaminophen (NORCO) 5-325 MG per tablet 1 tablet, 1 tablet, Per G Tube, Q4H PRN, Keyla Yuen, DO  •  [START ON 12/18/2022] levothyroxine (SYNTHROID, LEVOTHROID) tablet 75 mcg, 75 mcg, Per G Tube, Daily, Keyla Yuen, DO  •  morphine injection 1 mg, 1 mg, Intravenous, Q4H PRN **AND** naloxone (NARCAN) injection 0.4 mg, 0.4 mg, Intravenous, Q5 Min PRN, Jalil Smith III, DO  •  Nutrisource fiber pack 1 packet, 1 packet, Per G Tube, Daily, Radha Vidal, MS,RD,LD  •  ondansetron (ZOFRAN) injection 4 mg, 4 mg, Intravenous,  Q6H PRN, Jalil Smith III, DO  •  Pharmacy to Dose Heparin, , Does not apply, Continuous PRN, Roman Morin DO  •  Pharmacy to dose vancomycin, , Does not apply, Continuous PRN, Roman Morin DO  •  piperacillin-tazobactam (ZOSYN) 3.375 g in iso-osmotic dextrose 50 ml (premix), 3.375 g, Intravenous, Q8H, Roman Morin DO, 3.375 g at 12/17/22 1227  •  ProSource No Carb oral solution 30 mL, 30 mL, Per G Tube, Daily, Radha Vidal, MS,RD,LD  •  sodium chloride 0.9 % flush 10 mL, 10 mL, Intravenous, Q12H, Jalil Smith III, DO, 10 mL at 12/17/22 0004  •  sodium chloride 0.9 % flush 10 mL, 10 mL, Intravenous, PRN, Jalil Smith III, DO  •  sodium chloride 0.9 % infusion 40 mL, 40 mL, Intravenous, PRN, Jalil Smith III, DO  •  vancomycin in dextrose 5% 150 mL (VANCOCIN) IVPB 750 mg, 750 mg, Intravenous, Q12H, Roman Morin DO, 750 mg at 12/17/22 1227    Antibiotics:  Anti-Infectives (From admission, onward)    Ordered     Dose/Rate Route Frequency Start Stop    12/16/22 2216  vancomycin in dextrose 5% 150 mL (VANCOCIN) IVPB 750 mg        Ordering Provider: Roman Morin DO    750 mg Intravenous Every 12 Hours 12/17/22 1200 12/24/22 1159    12/16/22 2120  piperacillin-tazobactam (ZOSYN) 3.375 g in iso-osmotic dextrose 50 ml (premix)        Ordering Provider: Roman Mroin DO    3.375 g  over 4 Hours Intravenous Every 8 Hours 12/17/22 0400 12/22/22 0359    12/16/22 2140  piperacillin-tazobactam (ZOSYN) 3.375 g in iso-osmotic dextrose 50 ml (premix)        Ordering Provider: Jalil Smith III, DO    3.375 g  over 30 Minutes Intravenous Once 12/16/22 2142 12/16/22 2216 12/16/22 2120  Pharmacy to dose vancomycin        Ordering Provider: Roman Morin,      Does not apply Continuous PRN 12/16/22 2120 12/23/22 2119 12/16/22 2102  vancomycin 1500 mg/500 mL 0.9% NS IVPB ([facility])        Ordering Provider: Farooq Chaney PA    20 mg/kg × 74.4 kg Intravenous  Once 22 0001            Review of Systems:  Constitutional-- Low grade  Fever, chills no sweats.  Appetite fair, and no malaise. No fatigue.  HEENT-- No new vision, hearing or throat complaints.  No epistaxis or oral sores.  Denies odynophagia or dysphagia. No headache, photophobia or neck stiffness.  Neck drainage as per HPI.  CV-- No chest pain, palpitation or syncope  Resp-- No SOB/cough/Hemoptysis  GI- No nausea, vomiting, or diarrhea.  No hematochezia, melena, or hematemesis. Denies jaundice or chronic liver disease.  -- No dysuria, hematuria, or flank pain.  Denies hesitancy, urgency or flank pain.  Lymph- no swollen lymph nodes in neck/axilla or groin.   Heme- No active bruising or bleeding; no Hx of DVT or PE.  MS-- + swelling or pain right neck, shoulder, arm.   No new back pain.  Neuro-- No acute focal weakness or numbness in the arms or legs.  No seizures.  Skin--No rashes.  Right neck lesion draining purulent material.      Physical Exam:   Vital Signs  Temp (24hrs), Av.1 °F (36.7 °C), Min:98 °F (36.7 °C), Max:98.3 °F (36.8 °C)    Temp  Min: 98 °F (36.7 °C)  Max: 98.3 °F (36.8 °C)  BP  Min: 115/72  Max: 175/94  Pulse  Min: 56  Max: 100  Resp  Min: 18  Max: 24  SpO2  Min: 87 %  Max: 98 %    GENERAL: Awake and alert, in moderate distress.  Cachectic resting in bed.  HEENT: Normocephalic, atraumatic.  PERRL. EOMI. No conjunctival injection. No icterus. Oropharynx clear without evidence of thrush or exudate. No evidence of peridontal disease.    NECK: Supple, right neck with increased erythema and warmth along with radiation injury, no crepitus or bullae  HEART: RRR; No murmur, rubs, gallops.  No JVD.  LUNGS: Basilar rales, rhonchi. Normal respiratory effort. Nonlabored.  ABDOMEN: Soft, nontender, nondistended. Positive bowel sounds. No rebound or guarding.   EXT:  No cyanosis, clubbing or edema. No cord.  :  Without Venegas catheter.  MSK: No joint effusions or erythema  SKIN: Warm  and dry.  Right neck lesion approximately 1cm in diameter, with seropurulent drainage, induration, tenderness  NEURO: Oriented to PPT.  Motor 5/5 strength, sensory intact to light touch, DTRs 1+ symmetric, cranial nerves II through XII intact.  PSYCHIATRIC: Normal insight and judgment. Cooperative with PE    Laboratory Data    Results from last 7 days   Lab Units 12/17/22  0732 12/16/22 1931   WBC 10*3/mm3 7.48 8.98   HEMOGLOBIN g/dL 12.5* 14.1   HEMATOCRIT % 39.5 45.1   PLATELETS 10*3/mm3 202 227     Results from last 7 days   Lab Units 12/17/22  0732   SODIUM mmol/L 137   POTASSIUM mmol/L 3.8   CHLORIDE mmol/L 99   CO2 mmol/L 28.0   BUN mg/dL 22   CREATININE mg/dL 0.61*   GLUCOSE mg/dL 96   CALCIUM mg/dL 8.8     Results from last 7 days   Lab Units 12/17/22  0732   ALK PHOS U/L 78   BILIRUBIN mg/dL 1.0   ALT (SGPT) U/L 14   AST (SGOT) U/L 19     Results from last 7 days   Lab Units 12/16/22  1931   SED RATE mm/hr >130*     Results from last 7 days   Lab Units 12/16/22 1931   CRP mg/dL 12.45*     Results from last 7 days   Lab Units 12/16/22 1931   LACTATE mmol/L 0.9             Estimated Creatinine Clearance: 118.6 mL/min (A) (by C-G formula based on SCr of 0.61 mg/dL (L)).      Microbiology:  No results found for: ACANTHNAEG, AFBCX, BPERTUSSISCX, BLOODCX  No results found for: BCIDPCR, CXREFLEX, CSFCX, CULTURETIS  No results found for: CULTURES, HSVCX, URCX  No results found for: EYECULTURE, GCCX, HSVCULTURE, LABHSV  No results found for: LEGIONELLA, MRSACX, MUMPSCX, MYCOPLASCX  No results found for: NOCARDIACX, STOOLCX  No results found for: THROATCX, UNSTIMCULT, URINECX, CULTURE, VZVCULTUR  No results found for: VIRALCULTU, WOUNDCX        Radiology:  Imaging Results (Last 72 Hours)     Procedure Component Value Units Date/Time    XR Chest 1 View [[id number]] Collected: 12/17/22 1348     Updated: 12/17/22 1352    Narrative:      DATE OF EXAM:  12/17/2022 1:28 PM     PROCEDURE:  XR CHEST 1 VW-      INDICATIONS:  aspiration, hypoxia; L03.221-Cellulitis of neck; L02.11-Cutaneous  abscess of neck     COMPARISON:  Chest x-ray 11/10/2022     TECHNIQUE:   Single radiographic view of the chest was obtained.     FINDINGS:  Lungs normal expanded. Cardiomegaly. There is improved aeration of the  right lower lobe compared to previous study. There some linear opacity  in the right midlung right lower lobe likely due to scar. No new  parenchymal opacities. Pulmonary vessels are distinct. No pneumothorax  or pleural effusion.       Impression:      Improved appearance of chest. The airspace opacity right lower lobe has  resolved. There is linear opacity at the lung bases likely due to scar.     This report was finalized on 12/17/2022 1:49 PM by Shayy Manuel MD.       CT Soft Tissue Neck With Contrast [[id number]] Collected: 12/16/22 2028     Updated: 12/16/22 2053    Addenda:        Findings of neck abscess and poor visualization of the right internal  jugular vein concerning for compression or thrombosis discussed with HAI MARTINI by Dr. Sony Paulson via telephone on 12/16/2022 8:50  PM.     This report was finalized on 12/16/2022 8:50 PM by Sony Paulson.     Signed: 12/16/22 2050 by Sony Paulson MD    Narrative:      DATE OF EXAM: 12/16/2022 8:06 PM     PROCEDURE: CT SOFT TISSUE NECK W CONTRAST-     INDICATIONS: abscess/infetion right side at base of neck     COMPARISON: No comparisons available.     TECHNIQUE: After the intravenous administration of 80 mL of Isovue 300,  contiguous axial images were obtained through the neck. Coronal and  sagittal reconstructions were performed. Automated exposure control and  iterative reconstruction methods were used.      The radiation dose reduction device was turned on for each scan per the  ALARA (As Low as Reasonably Achievable) protocol.     FINDINGS:   Soft tissues: Within the right base of neck soft tissues there is a  rim-enhancing collection with  surrounding soft tissue stranding. This  collection measures approximately 6 x 3.7 x 2.4 cm. No evidence of large  rim-enhancing collection in the deeper soft tissues.     Pharynx/airway: There is frothy material seen within the hypopharynx.     Major salivary glands: Salivary glands are overall diminutive in size.     Thyroid: Normal.     Lymph nodes: Scattered subcentimeter lymph nodes are present in the neck  bilaterally, none of which is pathological by size or morphologic  criteria.     Vessels: The visualized arteries appear patent. There is mild  atherosclerotic narrowing of the arteries. The right internal jugular  vein is not seen and may be thrombosed or compressed.     Bones: Degenerative changes of the cervical spine.     Lung apices: Diffuse scattered groundglass opacities throughout the  biapical lungs may represent infectious or inflammatory process.  Biapical pleural parenchymal scarring.     Visualized brain parenchyma: Normal.     Other: Left lens replacement.          Impression:      Rim-enhancing fluid collection in the right base of neck soft tissues  concerning for abscess with soft tissue stranding and inflammatory  changes extending all along the right neck. No evidence of deep soft  tissue fluid collection.     Poor visualization of the right internal jugular vein which may be  compressed or thrombosed.     Frothy material seen within the hypopharynx which places the patient at  risk for aspiration.     Diffuse scattered groundglass opacities throughout the biapical lungs  may represent infection     This report was finalized on 12/16/2022 8:42 PM by Sony Paulson.               Impression:   - Right neck abscess and cellulitis, initial gram stain with no organisms, no cells, culture pending . MRSA PCR negative  - Throat cancer, s/p XRT, chemo/radical neck 2021, related to above issue.  - Hypopharynx with frothy materia, and scattered groundglass opacities biapically - increased risk of  aspiration.  - Elevated inflammatory markers, secondary to above.  ESR greater than 130, C-reactive protein 12.45 on .  Blood cultures x2 negative on 2022.  -Hypoalbuminemia 2.90.  Mild to moderate malnutrition.  -Anemia, chronic disease.  -Acute hypoxic respiratory failure, likely related to above issues and possible aspiration pneumonia.  6 L/min to 40 L/min nasal cannula.    PLAN/RECOMMENDATIONS:   Thank you for asking us to see Lucian Bermudez, I recommend the followin.  Diagnostically, monitor blood cultures, physical examination, radiology imaging, CBC, CMP, ESR, CRP, lactic acid, and procalcitonin, culture which have been obtained.  2.  Therapeutically, continue Zosyn pending cultures, and duration pending cultures and clinical improvement.  Add back vancomycin awaiting further data.  3.  Continue supportive care, oxygen support therapy at 40 L/min on 2022.  4.  ENT surgery Dr. Liban Langofrd following patient.    I discussed the patients findings and my recommendations with patient, and the patient's wife.    Thank you for asking me to see Mr. Bermudez.  Our group would be pleased to follow this patient over the course of their hospitalization and assist with outpatient antimicrobial therapy, as indicated.  Further recommendations depend on the results of the cultures and clinical course.  Side effects of medications discussed.  Increased risk for adverse drug reactions, line complications, need for surgery, readmission.     Dr. Tabor  has obtained the history, performed the physical exam and formulated the above treatment plan.   Ricardo Tabor MD  2022  16:58 EST

## 2022-12-17 NOTE — PROGRESS NOTES
Pharmacy Consult-Vancomycin Dosing  Lucian Bermudez is a  72 y.o. male receiving vancomycin therapy.     Indication: SSTI  Consulting Provider: Hospitalist  ID Consult: No    Goal AUC: 400 - 600 mg/L*hr    Current Antimicrobial Therapy  Anti-Infectives (From admission, onward)      Ordered     Dose/Rate Route Frequency Start Stop    12/16/22 2216  vancomycin in dextrose 5% 150 mL (VANCOCIN) IVPB 750 mg        Ordering Provider: Roman Morin DO    750 mg Intravenous Every 12 Hours 12/17/22 1000 12/24/22 0959    12/16/22 2140  piperacillin-tazobactam (ZOSYN) 3.375 g in iso-osmotic dextrose 50 ml (premix)        Ordering Provider: Jalil Smith III, DO    3.375 g  over 4 Hours Intravenous Every 8 Hours 12/17/22 0425 12/22/22 0424    12/16/22 2120  piperacillin-tazobactam (ZOSYN) 3.375 g in iso-osmotic dextrose 50 ml (premix)        Ordering Provider: Roman Morin DO    3.375 g  over 4 Hours Intravenous Every 8 Hours 12/17/22 0406 12/22/22 0405    12/16/22 2140  piperacillin-tazobactam (ZOSYN) 3.375 g in iso-osmotic dextrose 50 ml (premix)        Ordering Provider: Jalil Smith III, DO    3.375 g  over 30 Minutes Intravenous Once 12/16/22 2142 12/16/22 2216    12/16/22 2140  Pharmacy to dose vancomycin        Ordering Provider: Jalil Smith III, DO     Does not apply Continuous PRN 12/16/22 2139 12/23/22 2138 12/16/22 2120  Pharmacy to dose vancomycin        Ordering Provider: Roman Morin DO     Does not apply Continuous PRN 12/16/22 2120 12/23/22 2119 12/16/22 2102  vancomycin 1500 mg/500 mL 0.9% NS IVPB ([facility])        Ordering Provider: Farooq Chaney PA    20 mg/kg × 74.4 kg Intravenous Once 12/16/22 2104 12/16/22 2102  piperacillin-tazobactam (ZOSYN) 3.375 g in iso-osmotic dextrose 50 ml (premix)        Ordering Provider: Farooq Chaney PA    3.375 g Intravenous Once 12/16/22 2104              Allergies  Allergies as of 12/16/2022    (No Known Allergies)  "      Labs    Results from last 7 days   Lab Units 12/16/22 1933 12/16/22 1931   BUN mg/dL  --  29*   CREATININE mg/dL 0.80 0.80  0.77       Results from last 7 days   Lab Units 12/16/22 1931   WBC 10*3/mm3 8.98       Evaluation of Dosing     Last Dose Received in the ED/Outside Facility: ---  Is Patient on Dialysis or Renal Replacement: No    Ht - 182.9 cm (72\")  Wt - 74.4 kg (164 lb)    Estimated Creatinine Clearance: 90.4 mL/min (by C-G formula based on SCr of 0.8 mg/dL).    Intake & Output (last 3 days)       None            Microbiology and Radiology  Microbiology Results (last 10 days)       ** No results found for the last 240 hours. **            Reported Vancomycin Levels                         Insight[facility] AUC Calculation:    Current AUC:   --- mg/L*hr    Predicted Steady State AUC on Current Dose: --- mg/L*hr  _________________________________    Predicted Steady State AUC on New Dose:   402 mg/L*hr    Assessment/Plan:  1. Pharmacy to dose vancomycin for SSTI. Goal -600 mg/L*hr.  2. Patient scheduled to received a loading dose of vancomycin 1500 mg (~20 mg/kg) IV on 12/16 @ 2100 (has not received as of 2200). Will initiate a maintenance dose of vancomycin 750 mg IV Q12hr on 12/17 @ 1000.  3. Assess clearance by vancomycin level on 12/18 @ 0600, prior to 4th dose.  4. Pharmacy will continue to monitor renal function, cultures and sensitivities, and clinica l status to adjust regimen as necessary.    Ry Love RPH  12/16/2022   22:18 EST      "

## 2022-12-17 NOTE — PROGRESS NOTES
HEPARIN INFUSION  Lucian Bermudez is a  72 y.o. male receiving heparin infusion.     Therapy for (VTE/Cardiac): VTE  Patient Weight: 74.4 kg  Initial Bolus (Y/N):   Yes  Any Bolus (Y/N):   Yes        Signs or Symptoms of Bleeding: None noted    VTE (PE/DVT)   Initial Bolus: 80 units/kg (Max 10,000 units)  Initial rate: 18 units/kg/hr (Max 1,500 units/hr)    Anti Xa Rebolus Infusion Hold time Change infusion Dose (Units/kg/hr) Next Anti Xa Level Due   < 0.11 50 Units/kg  (4000 Units Max) None Increase by  4 Units/kg/hr 6 hours   0.11 - 0.19 25 Units/kg  (2000 Units Max) None Increase by  3 Units/kg/hr 6 hours   0.2 - 0.29 0 None Increase by  2 Units/kg/hr 6 hours   0.3 - 0.7 0 None No Change 6 hours (after 2 consecutive levels in range check qAM)   0.71 - 0.8 0 None Decrease by  1 Units/kg/hr 6 hours   0.81 - 0.9 0 None Decrease by  2 Units/kg/hr 6 hours   0.91 - 1 0 60 Minutes Decrease by  3 Units/kg/hr 6 hours   >1 0 Hold  After Anti Xa less than 0.7 decrease previous rate by  4 Units/kg/hr  Every 2 hours until Anti Xa is less than 0.7 then when infusion restarts in 6 hours     Results from last 7 days   Lab Units 12/17/22  0732 12/17/22  0018 12/16/22  1931   INR   --  1.16*  --    HEMOGLOBIN g/dL 12.5*  --  14.1   HEMATOCRIT % 39.5  --  45.1   PLATELETS 10*3/mm3 202  --  227          Date   Time   Anti-Xa Current Rate (Unit/kg/hr) Bolus   (Units) Rate Change   (Unit/kg/hr) New Rate (Unit/kg/hr) Next   Anti-Xa Comments  Pump Check Daily   12/17 0015 0.10 New Start 5950 +18 18 0630 D/w RN   12/17 0732 0.18 18 1860 +3 21 1600 D/w RN                                                                                                                                                                                                                        Thank you,  Troy Washington, [facility]  12/17/2022  09:35 EST

## 2022-12-17 NOTE — PROGRESS NOTES
[facility] Medicine Services  PROGRESS NOTE    Patient Name: Lucian Bermudez  : 1950  MRN: [id number]    Date of Admission: 2022  Primary Care Physician: Wei Simmons MD    Subjective   Subjective     CC:  Neck pain     HPI:  States he feels ok. Doing his tube feeds. Reviewed ENT recs. Notified later that patient laid down after tube feeds and thought that he aspirated. Coughing and hypoxic. CXR with improved appearance     ROS:  Gen- No fevers, chills  CV- No chest pain, palpitations  Resp- No cough, dyspnea  GI- No N/V/D, abd pain     Objective   Objective     Vital Signs:   Temp:  [97.6 °F (36.4 °C)-98.3 °F (36.8 °C)] 98 °F (36.7 °C)  Heart Rate:  [56-95] 76  Resp:  [16-18] 18  BP: (115-191)/() 138/90  Flow (L/min):  [2] 2     Physical Exam:  Constitutional: No acute distress, awake, alert; frail  HENT: NCAT, mucous membranes moist; bandage right lateral neck   Respiratory: Clear to auscultation bilaterally, respiratory effort normal   Cardiovascular: RRR, no murmurs, rubs, or gallops  Gastrointestinal: Positive bowel sounds, soft, nontender, nondistended; PEG in place   Musculoskeletal: No bilateral ankle edema  Psychiatric: Appropriate affect, cooperative  Neurologic: Oriented x 3, strength symmetric in all extremities, Cranial Nerves grossly intact to confrontation, speech clear  Skin: No rashes    Results Reviewed:  LAB RESULTS:      Lab 22   WBC  --  8.98   HEMOGLOBIN  --  14.1   HEMATOCRIT  --  45.1   PLATELETS  --  227   NEUTROS ABS  --  7.11*   IMMATURE GRANS (ABS)  --  0.03   LYMPHS ABS  --  1.08   MONOS ABS  --  0.62   EOS ABS  --  0.12   MCV  --  96.6   SED RATE  --  >130*   CRP  --  12.45*   PROCALCITONIN  --  0.08   LACTATE  --  0.9   PROTIME 14.8*  --    APTT 38.7*  --    HEPARIN ANTI-XA 0.10*  --          Lab 22   SODIUM  --  138   POTASSIUM  --  4.0   CHLORIDE  --  96*   CO2  --  32.0*    ANION GAP  --  10.0   BUN  --  29*   CREATININE 0.80 0.80  0.77   EGFR  --  96.3   GLUCOSE  --  108*   CALCIUM  --  9.9         Lab 12/16/22  1931   TOTAL PROTEIN 8.5   ALBUMIN 3.90   GLOBULIN 4.6   ALT (SGPT) 19   AST (SGOT) 28   BILIRUBIN 1.1   ALK PHOS 90         Lab 12/17/22  0018   PROTIME 14.8*   INR 1.16*                 Brief Urine Lab Results     None          Microbiology Results Abnormal     None          CT Soft Tissue Neck With Contrast    Addendum Date: 12/16/2022    Findings of neck abscess and poor visualization of the right internal jugular vein concerning for compression or thrombosis discussed with HAI MARTINI by Dr. Sony Paulson via telephone on 12/16/2022 8:50 PM.  This report was finalized on 12/16/2022 8:50 PM by Sony Paulson.      Result Date: 12/16/2022  DATE OF EXAM: 12/16/2022 8:06 PM  PROCEDURE: CT SOFT TISSUE NECK W CONTRAST-  INDICATIONS: abscess/infetion right side at base of neck  COMPARISON: No comparisons available.  TECHNIQUE: After the intravenous administration of 80 mL of Isovue 300, contiguous axial images were obtained through the neck. Coronal and sagittal reconstructions were performed. Automated exposure control and iterative reconstruction methods were used.  The radiation dose reduction device was turned on for each scan per the ALARA (As Low as Reasonably Achievable) protocol.  FINDINGS: Soft tissues: Within the right base of neck soft tissues there is a rim-enhancing collection with surrounding soft tissue stranding. This collection measures approximately 6 x 3.7 x 2.4 cm. No evidence of large rim-enhancing collection in the deeper soft tissues.  Pharynx/airway: There is frothy material seen within the hypopharynx.  Major salivary glands: Salivary glands are overall diminutive in size.  Thyroid: Normal.  Lymph nodes: Scattered subcentimeter lymph nodes are present in the neck bilaterally, none of which is pathological by size or morphologic criteria.   Vessels: The visualized arteries appear patent. There is mild atherosclerotic narrowing of the arteries. The right internal jugular vein is not seen and may be thrombosed or compressed.  Bones: Degenerative changes of the cervical spine.  Lung apices: Diffuse scattered groundglass opacities throughout the biapical lungs may represent infectious or inflammatory process. Biapical pleural parenchymal scarring.  Visualized brain parenchyma: Normal.  Other: Left lens replacement.       Impression: Rim-enhancing fluid collection in the right base of neck soft tissues concerning for abscess with soft tissue stranding and inflammatory changes extending all along the right neck. No evidence of deep soft tissue fluid collection.  Poor visualization of the right internal jugular vein which may be compressed or thrombosed.  Frothy material seen within the hypopharynx which places the patient at risk for aspiration.  Diffuse scattered groundglass opacities throughout the biapical lungs may represent infection  This report was finalized on 12/16/2022 8:42 PM by Sony Paulson.            I have reviewed the medications:  Scheduled Meds:levothyroxine, 75 mcg, Oral, Daily  piperacillin-tazobactam, 3.375 g, Intravenous, Q8H  sodium chloride, 10 mL, Intravenous, Q12H  vancomycin, 750 mg, Intravenous, Q12H      Continuous Infusions:heparin, 18 Units/kg/hr, Last Rate: 18 Units/kg/hr (12/17/22 0039)  Pharmacy to Dose Heparin,   Pharmacy to dose vancomycin,       PRN Meds:.•  acetaminophen  •  albuterol  •  HYDROcodone-acetaminophen  •  Morphine **AND** naloxone  •  ondansetron  •  Pharmacy to Dose Heparin  •  Pharmacy to dose vancomycin  •  sodium chloride  •  sodium chloride    Assessment & Plan   Assessment & Plan     Active Hospital Problems    Diagnosis  POA   • **Cellulitis and abscess of neck [L03.221, L02.11]  Yes   • Abscess [L02.91]  Yes      Resolved Hospital Problems   No resolved problems to display.        Brief Hospital  Course to date:  Lucian Bermudez is a 72 y.o. male with history of throat cancer and hypothyroid who presented with neck pain and abscess. Imaging with rim enhancing fluid collection in the right base of the neck concerning for abscess with no evidence of deep soft tissue fluid collection; R IJ vein compressed vs thrombosed. He was started on vanc and zosyn and admitted to medicine.     Neck abscess   Possible R IJ thrombosis   - Imaging per above  - BCx pending; MRSA pending; wound culture pending   - ENT and ID consulted   - Continue vanc/zosyn   - Continue heparin gtt   - Reviewed imaging with radiology -- uncertain if carotid and IJ will be able to be visualized with overlying abscess. Will attempt. Recommended repeating CT neck with contrast following I/D to be able to assess better. Will continue heparin gtt at this time     Bilateral upper lobe opacities   - CT with infectious vs inflammatory process  - Likely related to aspiration  - Continue vanc/zosyn     Hypoxia   - Likely related to aspiration; CXR with improved appearance  - Wean O2 as tolerated     Hypothyroid   - Synthroid     History of throat cancer   - Nutrition consult. Has PEG     Expected Discharge Location and Transportation: likely home   Expected Discharge        DVT prophylaxis:  Medical DVT prophylaxis orders are present.          CODE STATUS:   Code Status and Medical Interventions:   Ordered at: 12/16/22 4768     Level Of Support Discussed With:    Patient     Code Status (Patient has no pulse and is not breathing):    CPR (Attempt to Resuscitate)     Medical Interventions (Patient has pulse or is breathing):    Full Support       Keyla Yuen, DO  12/17/22

## 2022-12-17 NOTE — NURSING NOTE
Patient had bolus feeding of 1 boost, states 30 minutes later he lowered the head of his bed and began having a coughing episode which led to vomiting of thick mucus. Pt sat dropped, RT was notified and patient was given a neb treatment and put on hfnc. MD notified. Patient has been intermittently having these episodes this afternoon. The last episode around 1600 patient vomited a small amount of eda colored thin liquid. Vitals are stable, o2 sats holding low 90's on hfnc. Will continue to monitor

## 2022-12-17 NOTE — CONSULTS
Malnutrition Severity Assessment    Patient Name:  Lucian Bermudez  YOB: 1950  MRN: [id number]  Admit Date:  12/16/2022    Patient meets criteria for : Moderate (non-severe) Malnutrition    Comments:  Pt meets criteria for chronic non-severe malnutrition with the indicators of mild/moderate muscle wasting and subcutaneous fat loss. Pt weight remaining stable with [facility] nutrition through PEG; however unable to gain weight 2/2 high nutrition needs.    Malnutrition Severity Assessment  Malnutrition Type: Chronic Disease - Related Malnutrition  Malnutrition Type (last 8 hours)     Malnutrition Severity Assessment     Row Name 12/17/22 0942       Malnutrition Severity Assessment    Malnutrition Type Chronic Disease - Related Malnutrition    Row Name 12/17/22 0942       Muscle Loss    Loss of Muscle Mass Findings Moderate    Zoroastrian Region Moderate - slight depression    Clavicle Bone Region Moderate - some protrusion in females, visible in males    Acromion Bone Region Moderate - acromion may slightly protrude    Dorsal Hand Region None    Patellar Region None    Anterior Thigh Region --  mild    Posterior Calf Region --  mild    Row Name 12/17/22 0942       Fat Loss    Subcutaneous Fat Loss Findings Mild    Orbital Region  --  mild    Row Name 12/17/22 0942       Criteria Met (Must meet criteria for severity in at least 2 of these categories: M Wasting, Fat Loss, Fluid, Secondary Signs, Wt. Status, Intake)    Patient meets criteria for  Moderate (non-severe) Malnutrition                Electronically signed by:  Radha Vidal MS,RD,LD  12/17/22 09:52 EST

## 2022-12-18 ENCOUNTER — APPOINTMENT (OUTPATIENT)
Dept: CARDIOLOGY | Facility: HOSPITAL | Age: 72
DRG: 579 | End: 2022-12-18
Payer: MEDICARE

## 2022-12-18 PROBLEM — R09.02 HYPOXIA: Status: ACTIVE | Noted: 2022-12-18

## 2022-12-18 PROBLEM — D72.829 LEUKOCYTOSIS: Status: ACTIVE | Noted: 2022-12-18

## 2022-12-18 LAB
ALBUMIN SERPL-MCNC: 2.7 G/DL (ref 3.5–5.2)
ALBUMIN SERPL-MCNC: 2.7 G/DL (ref 3.5–5.2)
ALBUMIN SERPL-MCNC: 3.3 G/DL (ref 3.5–5.2)
ALBUMIN/GLOB SERPL: 0.7 G/DL
ALP SERPL-CCNC: 67 U/L (ref 39–117)
ALP SERPL-CCNC: 69 U/L (ref 39–117)
ALP SERPL-CCNC: 69 U/L (ref 39–117)
ALT SERPL W P-5'-P-CCNC: 16 U/L (ref 1–41)
ANION GAP SERPL CALCULATED.3IONS-SCNC: 16 MMOL/L (ref 5–15)
ANION GAP SERPL CALCULATED.3IONS-SCNC: 8 MMOL/L (ref 5–15)
ANION GAP SERPL CALCULATED.3IONS-SCNC: 8 MMOL/L (ref 5–15)
AST SERPL-CCNC: 23 U/L (ref 1–40)
AST SERPL-CCNC: 23 U/L (ref 1–40)
AST SERPL-CCNC: 26 U/L (ref 1–40)
BASOPHILS # BLD AUTO: 0.04 10*3/MM3 (ref 0–0.2)
BASOPHILS NFR BLD AUTO: 0.2 % (ref 0–1.5)
BH CV UPPER VENOUS LEFT SUBCLAVIAN AUGMENT: NORMAL
BH CV UPPER VENOUS LEFT SUBCLAVIAN COMPRESS: NORMAL
BH CV UPPER VENOUS RIGHT AXILLARY AUGMENT: NORMAL
BH CV UPPER VENOUS RIGHT AXILLARY COMPRESS: NORMAL
BH CV UPPER VENOUS RIGHT BASILIC FOREARM COMPRESS: NORMAL
BH CV UPPER VENOUS RIGHT BASILIC UPPER COMPRESS: NORMAL
BH CV UPPER VENOUS RIGHT BRACHIAL COMPRESS: NORMAL
BH CV UPPER VENOUS RIGHT CEPHALIC FOREARM COLOR: 1
BH CV UPPER VENOUS RIGHT CEPHALIC FOREARM COMPRESS: NORMAL
BH CV UPPER VENOUS RIGHT CEPHALIC FOREARM THROMBUS: NORMAL
BH CV UPPER VENOUS RIGHT CEPHALIC UPPER COMPRESS: NORMAL
BH CV UPPER VENOUS RIGHT INTERNAL JUGULAR COMPRESS: NORMAL
BH CV UPPER VENOUS RIGHT RADIAL COMPRESS: NORMAL
BH CV UPPER VENOUS RIGHT SUBCLAVIAN COMPRESS: NORMAL
BH CV UPPER VENOUS RIGHT ULNAR COMPRESS: NORMAL
BH CV VAS PRELIMINARY FINDINGS SCRIPTING: 1
BILIRUB SERPL-MCNC: 1 MG/DL (ref 0–1.2)
BILIRUB SERPL-MCNC: 1.1 MG/DL (ref 0–1.2)
BILIRUB SERPL-MCNC: 1.1 MG/DL (ref 0–1.2)
BUN SERPL-MCNC: 25 MG/DL (ref 8–23)
BUN SERPL-MCNC: 25 MG/DL (ref 8–23)
BUN SERPL-MCNC: 27 MG/DL (ref 8–23)
BUN/CREAT SERPL: 32.1 (ref 7–25)
CALCIUM SPEC-SCNC: 8.5 MG/DL (ref 8.6–10.5)
CALCIUM SPEC-SCNC: 8.5 MG/DL (ref 8.6–10.5)
CALCIUM SPEC-SCNC: 8.9 MG/DL (ref 8.6–10.5)
CHLORIDE SERPL-SCNC: 101 MMOL/L (ref 98–107)
CHOLEST SERPL-MCNC: 83 MG/DL (ref 0–200)
CHOLEST SERPL-MCNC: 87 MG/DL (ref 0–200)
CK SERPL-CCNC: 124 U/L (ref 20–200)
CO2 SERPL-SCNC: 24 MMOL/L (ref 22–29)
CO2 SERPL-SCNC: 31 MMOL/L (ref 22–29)
CO2 SERPL-SCNC: 31 MMOL/L (ref 22–29)
CREAT SERPL-MCNC: 0.78 MG/DL (ref 0.76–1.27)
CREAT SERPL-MCNC: 0.78 MG/DL (ref 0.76–1.27)
CREAT SERPL-MCNC: 0.79 MG/DL (ref 0.76–1.27)
D-LACTATE SERPL-SCNC: 1.1 MMOL/L (ref 0.5–2)
DEPRECATED RDW RBC AUTO: 45.2 FL (ref 37–54)
EGFRCR SERPLBLD CKD-EPI 2021: 95.9 ML/MIN/1.73
EOSINOPHIL # BLD AUTO: 0.02 10*3/MM3 (ref 0–0.4)
EOSINOPHIL NFR BLD AUTO: 0.1 % (ref 0.3–6.2)
ERYTHROCYTE [DISTWIDTH] IN BLOOD BY AUTOMATED COUNT: 12.7 % (ref 12.3–15.4)
GLOBULIN UR ELPH-MCNC: 3.7 GM/DL
GLUCOSE SERPL-MCNC: 125 MG/DL (ref 65–99)
GLUCOSE SERPL-MCNC: 125 MG/DL (ref 65–99)
GLUCOSE SERPL-MCNC: 99 MG/DL (ref 65–99)
HCT VFR BLD AUTO: 35.1 % (ref 37.5–51)
HGB BLD-MCNC: 11.3 G/DL (ref 13–17.7)
IMM GRANULOCYTES # BLD AUTO: 0.08 10*3/MM3 (ref 0–0.05)
IMM GRANULOCYTES NFR BLD AUTO: 0.4 % (ref 0–0.5)
L PNEUMO1 AG UR QL IA: NEGATIVE
LYMPHOCYTES # BLD AUTO: 0.97 10*3/MM3 (ref 0.7–3.1)
LYMPHOCYTES NFR BLD AUTO: 5.1 % (ref 19.6–45.3)
MAGNESIUM SERPL-MCNC: 1.8 MG/DL (ref 1.6–2.4)
MAGNESIUM SERPL-MCNC: 2.1 MG/DL (ref 1.6–2.4)
MAXIMAL PREDICTED HEART RATE: 150 BPM
MCH RBC QN AUTO: 31 PG (ref 26.6–33)
MCHC RBC AUTO-ENTMCNC: 32.2 G/DL (ref 31.5–35.7)
MCV RBC AUTO: 96.2 FL (ref 79–97)
MONOCYTES # BLD AUTO: 0.67 10*3/MM3 (ref 0.1–0.9)
MONOCYTES NFR BLD AUTO: 3.6 % (ref 5–12)
NEUTROPHILS NFR BLD AUTO: 17.09 10*3/MM3 (ref 1.7–7)
NEUTROPHILS NFR BLD AUTO: 90.6 % (ref 42.7–76)
NRBC BLD AUTO-RTO: 0 /100 WBC (ref 0–0.2)
PHOSPHATE SERPL-MCNC: 2.9 MG/DL (ref 2.5–4.5)
PHOSPHATE SERPL-MCNC: 4.4 MG/DL (ref 2.5–4.5)
PLATELET # BLD AUTO: 194 10*3/MM3 (ref 140–450)
PMV BLD AUTO: 10.3 FL (ref 6–12)
POTASSIUM SERPL-SCNC: 3.6 MMOL/L (ref 3.5–5.2)
POTASSIUM SERPL-SCNC: 3.7 MMOL/L (ref 3.5–5.2)
POTASSIUM SERPL-SCNC: 3.7 MMOL/L (ref 3.5–5.2)
PROCALCITONIN SERPL-MCNC: 1.26 NG/ML (ref 0–0.25)
PROT SERPL-MCNC: 6.4 G/DL (ref 6–8.5)
PROT SERPL-MCNC: 6.4 G/DL (ref 6–8.5)
PROT SERPL-MCNC: 6.7 G/DL (ref 6–8.5)
QT INTERVAL: 466 MS
QTC INTERVAL: 517 MS
RBC # BLD AUTO: 3.65 10*6/MM3 (ref 4.14–5.8)
SODIUM SERPL-SCNC: 140 MMOL/L (ref 136–145)
SODIUM SERPL-SCNC: 140 MMOL/L (ref 136–145)
SODIUM SERPL-SCNC: 141 MMOL/L (ref 136–145)
STRESS TARGET HR: 128 BPM
TRIGL SERPL-MCNC: 52 MG/DL (ref 0–150)
TRIGL SERPL-MCNC: 53 MG/DL (ref 0–150)
UFH PPP CHRO-ACNC: 0.11 IU/ML (ref 0.3–0.7)
UFH PPP CHRO-ACNC: 0.3 IU/ML (ref 0.3–0.7)
VANCOMYCIN SERPL-MCNC: 10 MCG/ML (ref 5–40)
VANCOMYCIN SERPL-MCNC: 28.9 MCG/ML (ref 5–40)
WBC NRBC COR # BLD: 18.87 10*3/MM3 (ref 3.4–10.8)

## 2022-12-18 PROCEDURE — 93005 ELECTROCARDIOGRAM TRACING: CPT | Performed by: NURSE PRACTITIONER

## 2022-12-18 PROCEDURE — 25010000002 ENOXAPARIN PER 10 MG: Performed by: INTERNAL MEDICINE

## 2022-12-18 PROCEDURE — 83605 ASSAY OF LACTIC ACID: CPT | Performed by: INTERNAL MEDICINE

## 2022-12-18 PROCEDURE — 84145 PROCALCITONIN (PCT): CPT | Performed by: INTERNAL MEDICINE

## 2022-12-18 PROCEDURE — 87070 CULTURE OTHR SPECIMN AEROBIC: CPT | Performed by: INTERNAL MEDICINE

## 2022-12-18 PROCEDURE — 25010000002 PIPERACILLIN SOD-TAZOBACTAM PER 1 G: Performed by: INTERNAL MEDICINE

## 2022-12-18 PROCEDURE — 93971 EXTREMITY STUDY: CPT

## 2022-12-18 PROCEDURE — 83735 ASSAY OF MAGNESIUM: CPT

## 2022-12-18 PROCEDURE — 99232 SBSQ HOSP IP/OBS MODERATE 35: CPT | Performed by: INTERNAL MEDICINE

## 2022-12-18 PROCEDURE — 87449 NOS EACH ORGANISM AG IA: CPT | Performed by: INTERNAL MEDICINE

## 2022-12-18 PROCEDURE — 87205 SMEAR GRAM STAIN: CPT | Performed by: INTERNAL MEDICINE

## 2022-12-18 PROCEDURE — 84478 ASSAY OF TRIGLYCERIDES: CPT

## 2022-12-18 PROCEDURE — 93971 EXTREMITY STUDY: CPT | Performed by: INTERNAL MEDICINE

## 2022-12-18 PROCEDURE — 94799 UNLISTED PULMONARY SVC/PX: CPT

## 2022-12-18 PROCEDURE — 93010 ELECTROCARDIOGRAM REPORT: CPT | Performed by: INTERNAL MEDICINE

## 2022-12-18 PROCEDURE — 82465 ASSAY BLD/SERUM CHOLESTEROL: CPT

## 2022-12-18 PROCEDURE — 80202 ASSAY OF VANCOMYCIN: CPT | Performed by: INTERNAL MEDICINE

## 2022-12-18 PROCEDURE — 25010000002 ALBUMIN HUMAN 25% PER 50 ML: Performed by: NURSE PRACTITIONER

## 2022-12-18 PROCEDURE — 25010000002 HEPARIN (PORCINE) 25000-0.45 UT/250ML-% SOLUTION

## 2022-12-18 PROCEDURE — 80053 COMPREHEN METABOLIC PANEL: CPT

## 2022-12-18 PROCEDURE — 85025 COMPLETE CBC W/AUTO DIFF WBC: CPT | Performed by: INTERNAL MEDICINE

## 2022-12-18 PROCEDURE — 80202 ASSAY OF VANCOMYCIN: CPT

## 2022-12-18 PROCEDURE — 80053 COMPREHEN METABOLIC PANEL: CPT | Performed by: INTERNAL MEDICINE

## 2022-12-18 PROCEDURE — 84100 ASSAY OF PHOSPHORUS: CPT

## 2022-12-18 PROCEDURE — 25010000002 VANCOMYCIN PER 500 MG: Performed by: INTERNAL MEDICINE

## 2022-12-18 PROCEDURE — P9047 ALBUMIN (HUMAN), 25%, 50ML: HCPCS | Performed by: NURSE PRACTITIONER

## 2022-12-18 PROCEDURE — 82550 ASSAY OF CK (CPK): CPT | Performed by: INTERNAL MEDICINE

## 2022-12-18 PROCEDURE — 85520 HEPARIN ASSAY: CPT

## 2022-12-18 RX ORDER — ALBUMIN (HUMAN) 12.5 G/50ML
25 SOLUTION INTRAVENOUS ONCE
Status: COMPLETED | OUTPATIENT
Start: 2022-12-18 | End: 2022-12-18

## 2022-12-18 RX ORDER — DEXTROMETHORPHAN POLISTIREX 30 MG/5ML
60 SUSPENSION ORAL EVERY 12 HOURS PRN
Status: DISCONTINUED | OUTPATIENT
Start: 2022-12-18 | End: 2022-12-19

## 2022-12-18 RX ORDER — MIDODRINE HYDROCHLORIDE 5 MG/1
5 TABLET ORAL ONCE
Status: COMPLETED | OUTPATIENT
Start: 2022-12-18 | End: 2022-12-18

## 2022-12-18 RX ORDER — MIDODRINE HYDROCHLORIDE 5 MG/1
2.5 TABLET ORAL
Status: DISCONTINUED | OUTPATIENT
Start: 2022-12-18 | End: 2022-12-19

## 2022-12-18 RX ORDER — ENOXAPARIN SODIUM 100 MG/ML
40 INJECTION SUBCUTANEOUS DAILY
Status: DISCONTINUED | OUTPATIENT
Start: 2022-12-18 | End: 2022-12-21 | Stop reason: HOSPADM

## 2022-12-18 RX ADMIN — MIDODRINE HYDROCHLORIDE 2.5 MG: 5 TABLET ORAL at 12:20

## 2022-12-18 RX ADMIN — Medication 1 PACKET: at 08:59

## 2022-12-18 RX ADMIN — MIDODRINE HYDROCHLORIDE 5 MG: 5 TABLET ORAL at 04:12

## 2022-12-18 RX ADMIN — TAZOBACTAM SODIUM AND PIPERACILLIN SODIUM 3.38 G: 375; 3 INJECTION, SOLUTION INTRAVENOUS at 13:47

## 2022-12-18 RX ADMIN — TAZOBACTAM SODIUM AND PIPERACILLIN SODIUM 3.38 G: 375; 3 INJECTION, SOLUTION INTRAVENOUS at 21:03

## 2022-12-18 RX ADMIN — SODIUM CHLORIDE 500 ML: 9 INJECTION, SOLUTION INTRAVENOUS at 00:16

## 2022-12-18 RX ADMIN — VANCOMYCIN HYDROCHLORIDE 750 MG: 750 INJECTION, SOLUTION INTRAVENOUS at 13:47

## 2022-12-18 RX ADMIN — Medication 30 ML: at 09:00

## 2022-12-18 RX ADMIN — ENOXAPARIN SODIUM 40 MG: 40 INJECTION SUBCUTANEOUS at 15:08

## 2022-12-18 RX ADMIN — TAZOBACTAM SODIUM AND PIPERACILLIN SODIUM 3.38 G: 375; 3 INJECTION, SOLUTION INTRAVENOUS at 04:53

## 2022-12-18 RX ADMIN — Medication 10 ML: at 08:59

## 2022-12-18 RX ADMIN — VANCOMYCIN HYDROCHLORIDE 750 MG: 750 INJECTION, SOLUTION INTRAVENOUS at 01:28

## 2022-12-18 RX ADMIN — Medication 10 ML: at 21:03

## 2022-12-18 RX ADMIN — ALBUTEROL SULFATE 2.5 MG: 2.5 SOLUTION RESPIRATORY (INHALATION) at 14:10

## 2022-12-18 RX ADMIN — ACETAMINOPHEN 650 MG: 160 SOLUTION ORAL at 15:08

## 2022-12-18 RX ADMIN — SODIUM CHLORIDE 500 ML: 9 INJECTION, SOLUTION INTRAVENOUS at 12:20

## 2022-12-18 RX ADMIN — LEVOTHYROXINE SODIUM 75 MCG: 0.07 TABLET ORAL at 08:58

## 2022-12-18 RX ADMIN — ALBUMIN (HUMAN) 25 G: 0.25 INJECTION, SOLUTION INTRAVENOUS at 04:00

## 2022-12-18 RX ADMIN — HEPARIN SODIUM 23 UNITS/KG/HR: 10000 INJECTION, SOLUTION INTRAVENOUS at 09:20

## 2022-12-18 NOTE — PROGRESS NOTES
HEPARIN INFUSION  Lucian Bermudez is a  72 y.o. male receiving heparin infusion.     Therapy for (VTE/Cardiac): VTE  Patient Weight: 74.4 kg  Initial Bolus (Y/N):   Yes  Any Bolus (Y/N):   Yes        Signs or Symptoms of Bleeding: None noted    VTE (PE/DVT)   Initial Bolus: 80 units/kg (Max 10,000 units)  Initial rate: 18 units/kg/hr (Max 1,500 units/hr)    Anti Xa Rebolus Infusion Hold time Change infusion Dose (Units/kg/hr) Next Anti Xa Level Due   < 0.11 50 Units/kg  (4000 Units Max) None Increase by  4 Units/kg/hr 6 hours   0.11 - 0.19 25 Units/kg  (2000 Units Max) None Increase by  3 Units/kg/hr 6 hours   0.2 - 0.29 0 None Increase by  2 Units/kg/hr 6 hours   0.3 - 0.7 0 None No Change 6 hours (after 2 consecutive levels in range check qAM)   0.71 - 0.8 0 None Decrease by  1 Units/kg/hr 6 hours   0.81 - 0.9 0 None Decrease by  2 Units/kg/hr 6 hours   0.91 - 1 0 60 Minutes Decrease by  3 Units/kg/hr 6 hours   >1 0 Hold  After Anti Xa less than 0.7 decrease previous rate by  4 Units/kg/hr  Every 2 hours until Anti Xa is less than 0.7 then when infusion restarts in 6 hours     Results from last 7 days   Lab Units 12/17/22  0732 12/17/22  0018 12/16/22  1931   INR   --  1.16*  --    HEMOGLOBIN g/dL 12.5*  --  14.1   HEMATOCRIT % 39.5  --  45.1   PLATELETS 10*3/mm3 202  --  227          Date   Time   Anti-Xa Current Rate (Unit/kg/hr) Bolus   (Units) Rate Change   (Unit/kg/hr) New Rate (Unit/kg/hr) Next   Anti-Xa Comments  Pump Check Daily   12/17 0015 0.10 New Start 5950 +18 18 0630 D/w RN   12/17 0732 0.18 18 1860 +3 21 1600 D/w RN   12/17 1832 0.24 21 --- +2 23 0100 D/w RN                                                                                                                                                                                                             Thank you,  Lucian Henderson, PharmD  12/17/2022  19:04 EST

## 2022-12-18 NOTE — PROGRESS NOTES
HEPARIN INFUSION  Lucian Bermudez is a  72 y.o. male receiving heparin infusion.     Therapy for (VTE/Cardiac): VTE  Patient Weight: 74.4 kg  Initial Bolus (Y/N):   Yes  Any Bolus (Y/N):   Yes        Signs or Symptoms of Bleeding: None noted    VTE (PE/DVT)   Initial Bolus: 80 units/kg (Max 10,000 units)  Initial rate: 18 units/kg/hr (Max 1,500 units/hr)    Anti Xa Rebolus Infusion Hold time Change infusion Dose (Units/kg/hr) Next Anti Xa Level Due   < 0.11 50 Units/kg  (4000 Units Max) None Increase by  4 Units/kg/hr 6 hours   0.11 - 0.19 25 Units/kg  (2000 Units Max) None Increase by  3 Units/kg/hr 6 hours   0.2 - 0.29 0 None Increase by  2 Units/kg/hr 6 hours   0.3 - 0.7 0 None No Change 6 hours (after 2 consecutive levels in range check qAM)   0.71 - 0.8 0 None Decrease by  1 Units/kg/hr 6 hours   0.81 - 0.9 0 None Decrease by  2 Units/kg/hr 6 hours   0.91 - 1 0 60 Minutes Decrease by  3 Units/kg/hr 6 hours   >1 0 Hold  After Anti Xa less than 0.7 decrease previous rate by  4 Units/kg/hr  Every 2 hours until Anti Xa is less than 0.7 then when infusion restarts in 6 hours     Results from last 7 days   Lab Units 12/18/22  0149 12/17/22  0732 12/17/22  0018 12/16/22  1931   INR   --   --  1.16*  --    HEMOGLOBIN g/dL 11.3* 12.5*  --  14.1   HEMATOCRIT % 35.1* 39.5  --  45.1   PLATELETS 10*3/mm3 194 202  --  227          Date   Time   Anti-Xa Current Rate (Unit/kg/hr) Bolus   (Units) Rate Change   (Unit/kg/hr) New Rate (Unit/kg/hr) Next   Anti-Xa Comments  Pump Check Daily   12/17 0015 0.10 New Start 5950 +18 18 0630 D/w RN   12/17 0732 0.18 18 1860 +3 21 1600 D/w RN   12/17 1832 0.24 21 --- +2 23 0100 D/w RN   12/18 0148 0.30 23 --- --- 23 0800 Discussed w/ nurse   12/18 1122 0.11 23 1860 +3 26 2000 D/w RN                                                                                                                                                                                     Thank you,  Troy Washington,  [facility]  12/18/2022  13:11 EST

## 2022-12-18 NOTE — PROGRESS NOTES
[facility] Medicine Services  PROGRESS NOTE    Patient Name: Lucian Bermudez  : 1950  MRN: [id number]    Date of Admission: 2022  Primary Care Physician: Wei Simmons MD    Subjective   Subjective     CC:  Neck abscess, resp failure    HPI:  States he felt better this morning. He was doing his morning feed. Neck improved. Reviewed pending duplex. Repeat issue with aspiration and coughing following lunch tube feed.     ROS:  Gen- No fevers, chills  CV- No chest pain, palpitations  Resp- + cough, dyspnea  GI- No N/V/D, abd pain      Objective   Objective     Vital Signs:   Temp:  [97.6 °F (36.4 °C)-100.4 °F (38 °C)] 97.6 °F (36.4 °C)  Heart Rate:  [] 74  Resp:  [18-24] 18  BP: ()/() 111/58  Flow (L/min):  [2-40] 30     Physical Exam:  Constitutional: No acute distress, awake, alert; frail  HENT: NCAT, mucous membranes moist; chronic changes right neck; wound with bandage  Respiratory: coarse; normal effort   Cardiovascular: RRR, no murmurs, rubs, or gallops  Gastrointestinal: Positive bowel sounds, soft, nontender, nondistended  Musculoskeletal: No bilateral ankle edema  Psychiatric: Appropriate affect, cooperative  Neurologic: Oriented x 3, strength symmetric in all extremities, Cranial Nerves grossly intact to confrontation, speech hoarse   Skin: No rashes    Results Reviewed:  LAB RESULTS:      Lab 22  0149 22  0148 22  1832 22  0732 22  0018 22  1931   WBC 18.87*  --   --  7.48  --  8.98   HEMOGLOBIN 11.3*  --   --  12.5*  --  14.1   HEMATOCRIT 35.1*  --   --  39.5  --  45.1   PLATELETS 194  --   --  202  --  227   NEUTROS ABS 17.09*  --   --  5.90  --  7.11*   IMMATURE GRANS (ABS) 0.08*  --   --  0.07*  --  0.03   LYMPHS ABS 0.97  --   --  0.81  --  1.08   MONOS ABS 0.67  --   --  0.54  --  0.62   EOS ABS 0.02  --   --  0.14  --  0.12   MCV 96.2  --   --  96.1  --  96.6   SED RATE  --   --   --   --   --  >130*    CRP  --   --   --   --   --  12.45*   PROCALCITONIN 1.26*  --   --   --   --  0.08   LACTATE 1.1  --   --   --   --  0.9   PROTIME  --   --   --   --  14.8*  --    APTT  --   --   --   --  38.7*  --    HEPARIN ANTI-XA  --  0.30 0.24* 0.18* 0.10*  --          Lab 12/18/22 0149 12/17/22  0732 12/16/22 1933 12/16/22 1931   SODIUM 140  140 137  --  138   POTASSIUM 3.7  3.7 3.8  --  4.0   CHLORIDE 101  101 99  --  96*   CO2 31.0*  31.0* 28.0  --  32.0*   ANION GAP 8.0  8.0 10.0  --  10.0   BUN 25*  25* 22  --  29*   CREATININE 0.78  0.78 0.61* 0.80 0.80  0.77   EGFR 95.9 103.3  --  96.3   GLUCOSE 125*  125* 96  --  108*   CALCIUM 8.5*  8.5* 8.8  --  9.9   MAGNESIUM 1.8 1.8  --   --    PHOSPHORUS 4.4  --   --   --          Lab 12/18/22 0149 12/17/22 0732 12/16/22 1931   TOTAL PROTEIN 6.4  6.4 7.3 8.5   ALBUMIN 2.70*  2.70* 2.90* 3.90   GLOBULIN 3.7 4.4 4.6   ALT (SGPT) 16  16 14 19   AST (SGOT) 23  23 19 28   BILIRUBIN 1.1  1.1 1.0 1.1   ALK PHOS 69  69 78 90         Lab 12/17/22  0018   PROTIME 14.8*   INR 1.16*         Lab 12/18/22 0149   CHOLESTEROL 87   TRIGLYCERIDES 53             Brief Urine Lab Results     None          Microbiology Results Abnormal     Procedure Component Value - Date/Time    Blood Culture - Blood, Arm, Left [[id number]]  (Normal) Collected: 12/16/22 1930    Lab Status: Preliminary result Specimen: Blood from Arm, Left Updated: 12/17/22 2000     Blood Culture No growth at 24 hours    Blood Culture - Blood, Arm, Left [[id number]]  (Normal) Collected: 12/16/22 1920    Lab Status: Preliminary result Specimen: Blood from Arm, Left Updated: 12/17/22 2000     Blood Culture No growth at 24 hours    Wound Culture - Aspirate, Neck [[id number]] Collected: 12/17/22 1016    Lab Status: Preliminary result Specimen: Aspirate from Neck Updated: 12/17/22 1654     Gram Stain Moderate (3+) WBCs per low power field      Few (2+) Gram variable bacilli    Wound Culture - Drainage, Neck  [[id number]] Collected: 12/17/22 0014    Lab Status: Preliminary result Specimen: Drainage from Neck Updated: 12/17/22 0857     Gram Stain No WBCs or organisms seen    MRSA Screen, PCR (Inpatient) - Swab, Nares [[id number]]  (Normal) Collected: 12/17/22 0014    Lab Status: Final result Specimen: Swab from Nares Updated: 12/17/22 0815     MRSA PCR Negative    Narrative:      The negative predictive value of this diagnostic test is high and should only be used to consider de-escalating anti-MRSA therapy. A positive result may indicate colonization with MRSA and must be correlated clinically.  MRSA Negative          CT Soft Tissue Neck With Contrast    Addendum Date: 12/16/2022    Findings of neck abscess and poor visualization of the right internal jugular vein concerning for compression or thrombosis discussed with HAI MARTINI by Dr. Sony Paulson via telephone on 12/16/2022 8:50 PM.  This report was finalized on 12/16/2022 8:50 PM by Sony Paulson.      Result Date: 12/16/2022  DATE OF EXAM: 12/16/2022 8:06 PM  PROCEDURE: CT SOFT TISSUE NECK W CONTRAST-  INDICATIONS: abscess/infetion right side at base of neck  COMPARISON: No comparisons available.  TECHNIQUE: After the intravenous administration of 80 mL of Isovue 300, contiguous axial images were obtained through the neck. Coronal and sagittal reconstructions were performed. Automated exposure control and iterative reconstruction methods were used.  The radiation dose reduction device was turned on for each scan per the ALARA (As Low as Reasonably Achievable) protocol.  FINDINGS: Soft tissues: Within the right base of neck soft tissues there is a rim-enhancing collection with surrounding soft tissue stranding. This collection measures approximately 6 x 3.7 x 2.4 cm. No evidence of large rim-enhancing collection in the deeper soft tissues.  Pharynx/airway: There is frothy material seen within the hypopharynx.  Major salivary glands: Salivary glands are  overall diminutive in size.  Thyroid: Normal.  Lymph nodes: Scattered subcentimeter lymph nodes are present in the neck bilaterally, none of which is pathological by size or morphologic criteria.  Vessels: The visualized arteries appear patent. There is mild atherosclerotic narrowing of the arteries. The right internal jugular vein is not seen and may be thrombosed or compressed.  Bones: Degenerative changes of the cervical spine.  Lung apices: Diffuse scattered groundglass opacities throughout the biapical lungs may represent infectious or inflammatory process. Biapical pleural parenchymal scarring.  Visualized brain parenchyma: Normal.  Other: Left lens replacement.       Impression: Rim-enhancing fluid collection in the right base of neck soft tissues concerning for abscess with soft tissue stranding and inflammatory changes extending all along the right neck. No evidence of deep soft tissue fluid collection.  Poor visualization of the right internal jugular vein which may be compressed or thrombosed.  Frothy material seen within the hypopharynx which places the patient at risk for aspiration.  Diffuse scattered groundglass opacities throughout the biapical lungs may represent infection  This report was finalized on 12/16/2022 8:42 PM by Sony Paulson.      XR Chest 1 View    Result Date: 12/17/2022  DATE OF EXAM: 12/17/2022 1:28 PM  PROCEDURE: XR CHEST 1 VW-  INDICATIONS: aspiration, hypoxia; L03.221-Cellulitis of neck; L02.11-Cutaneous abscess of neck  COMPARISON: Chest x-ray 11/10/2022  TECHNIQUE: Single radiographic view of the chest was obtained.  FINDINGS: Lungs normal expanded. Cardiomegaly. There is improved aeration of the right lower lobe compared to previous study. There some linear opacity in the right midlung right lower lobe likely due to scar. No new parenchymal opacities. Pulmonary vessels are distinct. No pneumothorax or pleural effusion.      Impression: Improved appearance of chest. The  airspace opacity right lower lobe has resolved. There is linear opacity at the lung bases likely due to scar.  This report was finalized on 12/17/2022 1:49 PM by Shayy Manuel MD.            I have reviewed the medications:  Scheduled Meds:levothyroxine, 75 mcg, Per G Tube, Daily  Nutrisource fiber, 1 packet, Per G Tube, Daily  piperacillin-tazobactam, 3.375 g, Intravenous, Q8H  ProSource No Carb, 30 mL, Per G Tube, Daily  sodium chloride, 10 mL, Intravenous, Q12H  vancomycin, 750 mg, Intravenous, Q12H      Continuous Infusions:heparin, 23 Units/kg/hr, Last Rate: 23 Units/kg/hr (12/17/22 2002)  Pharmacy to Dose Heparin,   Pharmacy to dose vancomycin,       PRN Meds:.•  acetaminophen  •  acetaminophen  •  albuterol  •  HYDROcodone-acetaminophen  •  melatonin  •  Morphine **AND** naloxone  •  ondansetron  •  Pharmacy to Dose Heparin  •  Pharmacy to dose vancomycin  •  sodium chloride  •  sodium chloride    Assessment & Plan   Assessment & Plan     Active Hospital Problems    Diagnosis  POA   • **Cellulitis and abscess of neck [L03.221, L02.11]  Yes   • Moderate malnutrition (HCC) [E44.0]  Yes   • Abscess [L02.91]  Yes      Resolved Hospital Problems   No resolved problems to display.        Brief Hospital Course to date:  Lucian Bermudez is a 72 y.o. male with history of throat cancer and hypothyroid who presented with neck pain and abscess. Imaging with rim enhancing fluid collection in the right base of the neck concerning for abscess with no evidence of deep soft tissue fluid collection; R IJ vein compressed vs thrombosed. He was started on vanc and zosyn and admitted to medicine.     Neck abscess   Possible R IJ thrombosis   - Imaging per above  - BCx NGTD; MRSA negative; wound culture pending - gram variable bacilli on stain  - ENT and ID consulted   - Continue vanc/zosyn   - Reviewed imaging with radiology -- uncertain if carotid and IJ will be able to be visualized with overlying abscess and extensive radiation  damage with US. Duplex obtain and does show flow in the R IJ. Shows compression of the vessel but likely related to extensive radiation damage. Initially on heparin gtt and will DC.     Bilateral upper lobe opacities   - CT with infectious vs inflammatory process  - Likely related to aspiration  - Continue vanc/zosyn     Leukocytosis   - Elevated following aspiration event. Monitor    Hypoxia   Aspiration event   - Following laying flat shortly after his bolus tube feeds  - CXR with improvement  - Mobilize. Wean O2 as tolerated     Hypothyroid   - Synthroid     History of throat cancer   - Nutrition consult. Has PEG       Expected Discharge Location and Transportation: home  Expected Discharge   Expected Discharge Date and Time     Expected Discharge Date Expected Discharge Time    Dec 20, 2022            DVT prophylaxis:  Medical DVT prophylaxis orders are present.          CODE STATUS:   Code Status and Medical Interventions:   Ordered at: 12/16/22 2140     Level Of Support Discussed With:    Patient     Code Status (Patient has no pulse and is not breathing):    CPR (Attempt to Resuscitate)     Medical Interventions (Patient has pulse or is breathing):    Full Support       Keyla Yuen,   12/18/22

## 2022-12-18 NOTE — PROGRESS NOTES
INFECTIOUS DISEASE Progress note    Lucian Bermudez  1950  [id number]      Admission Date: 12/16/2022      Requesting Provider: No ref. provider found  Evaluating Physician: MARIELA Dozier    Reason for Consultation: neck abscess, right    History of present illness:    Patient is a 72 y.o. male, with PMH throat cancer, s/p chemo/XRT/radical neck 2021, evaluated 12/17/2022 for a neck abscess on the right.  He developed the right neck lesion several months ago, which has been spontaneously draining off and on. He was seen by his PCP for a wellness check yesterday, and complained of neck soreness, tenderness, and was referred to the ED. They attempted to drain the lesion obtaining purulent drainage. He has been afebrile. Admitting labs with ESR >>130, CRP 12.45, WBC 8.98, lactate 0.9, Scr 0.77, MRSA PCR negative. CT neck with rim enhancing fluid collection in the right base of neck soft tissues concerning for abscess ( 6x3.7x2.4)  with soft tissue stranding and inflammatory changes extending all along the right neck.  No evidence of deep soft tissue fluid collection. Frothy material seen within the hypopharynx which places the patient at risk of aspiration , diffuse scattered groundglass opacities throughout the biapical lungs. Gram stain of wound with no WBCs, no organisms, culture pending. Currently on Vancomycin and Zosyn and we were consulted for evaluation and treatment on 12/17/2022. He did have a low grade fever Monday with chills. No increased sputum production, nausea, vomiting. Complains of neck soreness, extending down shoulder and arm.   Dr. Langford, ENT, has obtained another culture, and is going to attempt to open the lesion.  He has no other localizing signs or symptoms of infection.  He denies ill contacts, TB or HIV.    12/18/12: History reviewed.  Feels better.  Continues on vancomycin and piperacillin tazobactam awaiting cultures.  Duration to be determined.  Tmax of 100.4.  Wound  culture pending. Blood cultures negative to date. Wound still draining seropurulent drainage. He denies n/v/d.  He thinks his neck is less sore today.  Has been hypotensive, got albumin last pm.     Past Medical History:   Diagnosis Date   • Cancer (HCC)    • H/O colonoscopy    • Hypothyroidism        Past Surgical History:   Procedure Laterality Date   • CATARACT EXTRACTION     • COLONOSCOPY     • LYMPH NODE DISSECTION     • PEG TUBE INSERTION  2000   • PEG TUBE REPLACEMENT  2016       Family History   Problem Relation Age of Onset   • Cancer Mother    • Lung cancer Father    • Cancer Father    • Melanoma Sister    • No Known Problems Son    • No Known Problems Maternal Grandmother    • No Known Problems Maternal Grandfather    • No Known Problems Paternal Grandmother    • No Known Problems Paternal Grandfather        Social History     Socioeconomic History   • Marital status:    Tobacco Use   • Smoking status: Never   • Smokeless tobacco: Never   Vaping Use   • Vaping Use: Never used   Substance and Sexual Activity   • Alcohol use: Not Currently     Comment: occasional   • Drug use: Never   • Sexual activity: Yes     Partners: Female       No Known Allergies      Medication:    Current Facility-Administered Medications:   •  acetaminophen (TYLENOL) 160 MG/5ML solution 650 mg, 650 mg, Per G Tube, Q4H PRN, Keyla Yuen, DO, 650 mg at 12/17/22 1744  •  acetaminophen (TYLENOL) tablet 650 mg, 650 mg, Per G Tube, Q4H PRN, Keyla Yuen, DO  •  albuterol (PROVENTIL) nebulizer solution 0.083% 2.5 mg/3mL, 2.5 mg, Nebulization, Q4H PRN, Jalil Smith III, DO, 2.5 mg at 12/17/22 1246  •  heparin 49788 units/250 mL (100 units/mL) in 0.45 % NaCl infusion, 23 Units/kg/hr, Intravenous, Titrated, Lucian Henderson, PharmD, Last Rate: 17.11 mL/hr at 12/17/22 2002, 23 Units/kg/hr at 12/17/22 2002  •  HYDROcodone-acetaminophen (NORCO) 5-325 MG per tablet 1 tablet, 1 tablet, Per G Tube, Q4H PRN, Keyla Yuen,  DO  •  levothyroxine (SYNTHROID, LEVOTHROID) tablet 75 mcg, 75 mcg, Per G Tube, Daily, Keyla Yuen, DO  •  melatonin tablet 5 mg, 5 mg, Oral, Nightly PRN, Jacquie Ribeiro, APRN  •  morphine injection 1 mg, 1 mg, Intravenous, Q4H PRN **AND** naloxone (NARCAN) injection 0.4 mg, 0.4 mg, Intravenous, Q5 Min PRN, Jalil Smith III, DO  •  Nutrisource fiber pack 1 packet, 1 packet, Per G Tube, Daily, Radha Vidal, MS,RD,LD  •  ondansetron (ZOFRAN) injection 4 mg, 4 mg, Intravenous, Q6H PRN, Jalil Smith III, DO  •  Pharmacy to Dose Heparin, , Does not apply, Continuous PRN, Roman Morin, DO  •  Pharmacy to dose vancomycin, , Does not apply, Continuous PRN, Roman Morin, DO  •  piperacillin-tazobactam (ZOSYN) 3.375 g in iso-osmotic dextrose 50 ml (premix), 3.375 g, Intravenous, Q8H, Roman Morin, DO, 3.375 g at 12/18/22 0453  •  ProSource No Carb oral solution 30 mL, 30 mL, Per G Tube, Daily, Radha Vidal, MS,RD,LD  •  sodium chloride 0.9 % flush 10 mL, 10 mL, Intravenous, Q12H, Jalil Smith III, DO, 10 mL at 12/17/22 2006  •  sodium chloride 0.9 % flush 10 mL, 10 mL, Intravenous, PRN, Jalil Smith III, DO  •  sodium chloride 0.9 % infusion 40 mL, 40 mL, Intravenous, PRN, Jalil Smith III, DO  •  vancomycin in dextrose 5% 150 mL (VANCOCIN) IVPB 750 mg, 750 mg, Intravenous, Q12H, Roman Morin, DO, 750 mg at 12/18/22 0128    Antibiotics:  Anti-Infectives (From admission, onward)    Ordered     Dose/Rate Route Frequency Start Stop    12/16/22 2216  vancomycin in dextrose 5% 150 mL (VANCOCIN) IVPB 750 mg        Ordering Provider: Roman Morin DO    750 mg Intravenous Every 12 Hours 12/17/22 1200 12/24/22 1159    12/16/22 2120  piperacillin-tazobactam (ZOSYN) 3.375 g in iso-osmotic dextrose 50 ml (premix)        Ordering Provider: Roman Morin DO    3.375 g  over 4 Hours Intravenous Every 8 Hours 12/17/22 0400 12/22/22 0359    12/16/22 2140   piperacillin-tazobactam (ZOSYN) 3.375 g in iso-osmotic dextrose 50 ml (premix)        Ordering Provider: Jalil Smith III, DO    3.375 g  over 30 Minutes Intravenous Once 22  Pharmacy to dose vancomycin        Ordering Provider: Roman Morin DO     Does not apply Continuous PRN 22  vancomycin 1500 mg/500 mL 0.9% NS IVPB ([facility])        Ordering Provider: Farooq Chaney PA    20 mg/kg × 74.4 kg Intravenous Once 22 0001            Review of Systems:  Constitutional-- Low grade  Fever, chills no sweats.  Appetite fair, and no malaise. No fatigue.  HEENT-- No new vision, hearing or throat complaints.  No epistaxis or oral sores.  Denies odynophagia or dysphagia. No headache, photophobia or neck stiffness.  Neck drainage as per HPI.  CV-- No chest pain, palpitation or syncope  Resp-- No SOB/cough/Hemoptysis  GI- No nausea, vomiting, or diarrhea.  No hematochezia, melena, or hematemesis. Denies jaundice or chronic liver disease.  -- No dysuria, hematuria, or flank pain.  Denies hesitancy, urgency or flank pain.  Lymph- no swollen lymph nodes in neck/axilla or groin.   Heme- No active bruising or bleeding; no Hx of DVT or PE.  MS-- + swelling or pain right neck, shoulder, arm.   No new back pain.  Neuro-- No acute focal weakness or numbness in the arms or legs.  No seizures.  Skin--No rashes.  Right neck lesion draining purulent material.  No crepitus or bullae.      Physical Exam:   Vital Signs  Temp (24hrs), Av.6 °F (37 °C), Min:97.6 °F (36.4 °C), Max:100.4 °F (38 °C)    Temp  Min: 97.6 °F (36.4 °C)  Max: 100.4 °F (38 °C)  BP  Min: 77/43  Max: 163/114  Pulse  Min: 51  Max: 114  Resp  Min: 18  Max: 24  SpO2  Min: 87 %  Max: 97 %    GENERAL: Awake and alert, in mild distress.  Cachectic resting in bed.  Appears older than stated age.  HEENT: Normocephalic, atraumatic.  PERRL. EOMI. No conjunctival  injection. No icterus. Oropharynx clear without evidence of thrush or exudate. No evidence of peridontal disease.    NECK: Supple, right neck with decreased erythema and warmth along with radiation injury, no crepitus or bullae  HEART: RRR; No murmur, rubs, gallops.  No JVD.  LUNGS: Basilar rales, rhonchi. Normal respiratory effort. Nonlabored.  ABDOMEN: Soft, nontender, nondistended. Positive bowel sounds. No rebound or guarding.   EXT:  No cyanosis, clubbing or edema. No cord.  :  Without Venegas catheter.  MSK: No joint effusions or erythema  SKIN: Warm and dry.  Right neck lesion approximately 1cm in diameter, with copious seropurulent drainage, induration, tenderness  NEURO: Oriented to PPT.  Nonfocal  PSYCHIATRIC: Normal insight and judgment. Cooperative with PE    Laboratory Data    Results from last 7 days   Lab Units 12/18/22  0149 12/17/22  0732 12/16/22 1931   WBC 10*3/mm3 18.87* 7.48 8.98   HEMOGLOBIN g/dL 11.3* 12.5* 14.1   HEMATOCRIT % 35.1* 39.5 45.1   PLATELETS 10*3/mm3 194 202 227     Results from last 7 days   Lab Units 12/18/22  0149   SODIUM mmol/L 140  140   POTASSIUM mmol/L 3.7  3.7   CHLORIDE mmol/L 101  101   CO2 mmol/L 31.0*  31.0*   BUN mg/dL 25*  25*   CREATININE mg/dL 0.78  0.78   GLUCOSE mg/dL 125*  125*   CALCIUM mg/dL 8.5*  8.5*     Results from last 7 days   Lab Units 12/18/22 0149   ALK PHOS U/L 69  69   BILIRUBIN mg/dL 1.1  1.1   ALT (SGPT) U/L 16  16   AST (SGOT) U/L 23  23     Results from last 7 days   Lab Units 12/16/22 1931   SED RATE mm/hr >130*     Results from last 7 days   Lab Units 12/16/22 1931   CRP mg/dL 12.45*     Results from last 7 days   Lab Units 12/18/22  0149   LACTATE mmol/L 1.1     Results from last 7 days   Lab Units 12/18/22 0149   CK TOTAL U/L 124     Results from last 7 days   Lab Units 12/18/22  0149   VANCOMYCIN RM mcg/mL 28.90     Estimated Creatinine Clearance: 92.7 mL/min (by C-G formula based on SCr of 0.78  mg/dL).      Microbiology:  No results found for: ACANTHNAEG, AFBCX, BPERTUSSISCX, BLOODCX  No results found for: BCIDPCR, CXREFLEX, CSFCX, CULTURETIS  No results found for: CULTURES, HSVCX, URCX  No results found for: EYECULTURE, GCCX, HSVCULTURE, LABHSV  No results found for: LEGIONELLA, MRSACX, MUMPSCX, MYCOPLASCX  No results found for: NOCARDIACX, STOOLCX  No results found for: THROATCX, UNSTIMCULT, URINECX, CULTURE, VZVCULTUR  No results found for: VIRALCULTU, WOUNDCX        Radiology:  Imaging Results (Last 72 Hours)     Procedure Component Value Units Date/Time    XR Chest 1 View [[id number]] Collected: 12/17/22 1348     Updated: 12/17/22 1352    Narrative:      DATE OF EXAM:  12/17/2022 1:28 PM     PROCEDURE:  XR CHEST 1 VW-     INDICATIONS:  aspiration, hypoxia; L03.221-Cellulitis of neck; L02.11-Cutaneous  abscess of neck     COMPARISON:  Chest x-ray 11/10/2022     TECHNIQUE:   Single radiographic view of the chest was obtained.     FINDINGS:  Lungs normal expanded. Cardiomegaly. There is improved aeration of the  right lower lobe compared to previous study. There some linear opacity  in the right midlung right lower lobe likely due to scar. No new  parenchymal opacities. Pulmonary vessels are distinct. No pneumothorax  or pleural effusion.       Impression:      Improved appearance of chest. The airspace opacity right lower lobe has  resolved. There is linear opacity at the lung bases likely due to scar.     This report was finalized on 12/17/2022 1:49 PM by Shayy Manuel MD.       CT Soft Tissue Neck With Contrast [[id number]] Collected: 12/16/22 2028     Updated: 12/16/22 2053    Addenda:        Findings of neck abscess and poor visualization of the right internal  jugular vein concerning for compression or thrombosis discussed with HAI MARTINI by Dr. Sony Paulson via telephone on 12/16/2022 8:50  PM.     This report was finalized on 12/16/2022 8:50 PM by Sony Paulson.     Signed: 12/16/22  2050 by Sony Paulson MD    Narrative:      DATE OF EXAM: 12/16/2022 8:06 PM     PROCEDURE: CT SOFT TISSUE NECK W CONTRAST-     INDICATIONS: abscess/infetion right side at base of neck     COMPARISON: No comparisons available.     TECHNIQUE: After the intravenous administration of 80 mL of Isovue 300,  contiguous axial images were obtained through the neck. Coronal and  sagittal reconstructions were performed. Automated exposure control and  iterative reconstruction methods were used.      The radiation dose reduction device was turned on for each scan per the  ALARA (As Low as Reasonably Achievable) protocol.     FINDINGS:   Soft tissues: Within the right base of neck soft tissues there is a  rim-enhancing collection with surrounding soft tissue stranding. This  collection measures approximately 6 x 3.7 x 2.4 cm. No evidence of large  rim-enhancing collection in the deeper soft tissues.     Pharynx/airway: There is frothy material seen within the hypopharynx.     Major salivary glands: Salivary glands are overall diminutive in size.     Thyroid: Normal.     Lymph nodes: Scattered subcentimeter lymph nodes are present in the neck  bilaterally, none of which is pathological by size or morphologic  criteria.     Vessels: The visualized arteries appear patent. There is mild  atherosclerotic narrowing of the arteries. The right internal jugular  vein is not seen and may be thrombosed or compressed.     Bones: Degenerative changes of the cervical spine.     Lung apices: Diffuse scattered groundglass opacities throughout the  biapical lungs may represent infectious or inflammatory process.  Biapical pleural parenchymal scarring.     Visualized brain parenchyma: Normal.     Other: Left lens replacement.          Impression:      Rim-enhancing fluid collection in the right base of neck soft tissues  concerning for abscess with soft tissue stranding and inflammatory  changes extending all along the right neck. No  evidence of deep soft  tissue fluid collection.     Poor visualization of the right internal jugular vein which may be  compressed or thrombosed.     Frothy material seen within the hypopharynx which places the patient at  risk for aspiration.     Diffuse scattered groundglass opacities throughout the biapical lungs  may represent infection     This report was finalized on 12/16/2022 8:42 PM by Sony Paulson.               Impression:   - Right neck abscess and cellulitis, initial gram stain with no organisms, no cells, culture pending . MRSA PCR negative  - Throat cancer, s/p XRT, chemo/radical neck 2021, related to above issue.  - Hypopharynx with frothy materia, and scattered groundglass opacities biapically - increased risk of aspiration.  - Elevated inflammatory markers, secondary to above.  ESR greater than 130, C-reactive protein 12.45 on 12/16.  Blood cultures x2 negative on 12/162022.  -Hypoalbuminemia 2.90.  Mild to moderate malnutrition.  -Anemia, chronic disease.  Worse.  -Acute hypoxic respiratory failure, likely related to above issues and possible aspiration pneumonia.  6 L/min to 40 L/min nasal cannula.  -Leukocytosis, neutrophilic, worse.  Related to above issues.    PLAN/RECOMMENDATIONS:     1.  Diagnostically, monitor blood cultures, physical examination, radiology imaging, CBC, CMP, ESR, CRP, lactic acid, and procalcitonin, culture which have been obtained.  2.  Therapeutically, continue vancomycin and piperacillin tazobactam (12/17-) pending cultures, and duration pending cultures and clinical improvement.    3.  Continue supportive care, oxygen support therapy at 40 L/min on 12/17/2022.  4.  ENT surgery Dr. Liban Langford following patient.    I discussed the patients findings and my recommendations with patient, and the patient's wife.    Our group would be pleased to follow this patient over the course of their hospitalization and assist with outpatient antimicrobial therapy, as indicated.   Further recommendations depend on the results of the cultures and clinical course.  Side effects of medications discussed.  Increased risk for adverse drug reactions, line complications, need for surgery, readmission.     Dr. Tabor  has obtained the history, performed the physical exam and formulated the above treatment plan.   MARIELA Dozier  12/18/2022  06:12 EST

## 2022-12-18 NOTE — PROGRESS NOTES
"Pharmacy Consult-Vancomycin Dosing  Lucian Bermudez is a  72 y.o. male receiving vancomycin therapy.     Indication: SSTI  Consulting Provider: Hospitalist  ID Consult: Yes    Goal AUC: 400 - 600 mg/L*hr    Current Antimicrobial Therapy  Anti-Infectives (From admission, onward)      Ordered     Dose/Rate Route Frequency Start Stop    12/16/22 2216  vancomycin in dextrose 5% 150 mL (VANCOCIN) IVPB 750 mg        Ordering Provider: Roman Morin DO    750 mg Intravenous Every 12 Hours 12/17/22 1200 12/24/22 1159    12/16/22 2120  piperacillin-tazobactam (ZOSYN) 3.375 g in iso-osmotic dextrose 50 ml (premix)        Ordering Provider: Roman Morin DO    3.375 g  over 4 Hours Intravenous Every 8 Hours 12/17/22 0400 12/22/22 0359    12/16/22 2140  piperacillin-tazobactam (ZOSYN) 3.375 g in iso-osmotic dextrose 50 ml (premix)        Ordering Provider: Jalil Smith III, DO    3.375 g  over 30 Minutes Intravenous Once 12/16/22 2142 12/16/22 2216 12/16/22 2120  Pharmacy to dose vancomycin        Ordering Provider: Roman Morin DO     Does not apply Continuous PRN 12/16/22 2120 12/23/22 2119 12/16/22 2102  vancomycin 1500 mg/500 mL 0.9% NS IVPB ([facility])        Ordering Provider: Farooq Chaney PA    20 mg/kg × 74.4 kg Intravenous Once 12/16/22 2104 12/17/22 0001            Allergies  Allergies as of 12/16/2022    (No Known Allergies)       Labs    Results from last 7 days   Lab Units 12/18/22  0149 12/17/22  0732 12/16/22  1933 12/16/22  1931   BUN mg/dL 25*  25* 22  --  29*   CREATININE mg/dL 0.78  0.78 0.61* 0.80 0.80  0.77       Results from last 7 days   Lab Units 12/18/22  0149 12/17/22  0732 12/16/22  1931   WBC 10*3/mm3 18.87* 7.48 8.98       Evaluation of Dosing     Last Dose Received in the ED/Outside Facility: ---  Is Patient on Dialysis or Renal Replacement: No    Ht - 180 cm (70.87\")  Wt - 74.4 kg (164 lb)    Estimated Creatinine Clearance: 92.7 mL/min (by C-G formula based on SCr of 0.78 " mg/dL).    Intake & Output (last 3 days)         12/15 0701  12/16 0700 12/16 0701  12/17 0700 12/17 0701  12/18 0700 12/18 0701  12/19 0700    P.O.   0 0    Total Intake(mL/kg)   0 (0) 0 (0)    Urine (mL/kg/hr)  200 100 (0.1)     Total Output  200 100     Net  -200 -100 0                    Microbiology and Radiology  Microbiology Results (last 10 days)       Procedure Component Value - Date/Time    Respiratory Culture - Sputum, Lung, Right Lower Lobe [[id number]] Collected: 12/18/22 0450    Lab Status: Preliminary result Specimen: Sputum from Lung, Right Lower Lobe Updated: 12/18/22 0859     Gram Stain Many (4+) WBCs per low power field      Rare (1+) Epithelial cells per low power field      Rare (1+) Mixed bacterial morphotypes seen on Gram Stain    Wound Culture - Aspirate, Neck [[id number]]  (Abnormal) Collected: 12/17/22 1016    Lab Status: Preliminary result Specimen: Aspirate from Neck Updated: 12/18/22 1219     Wound Culture Moderate growth (3+) Corynebacterium species     Comment:   No definitive guidelines. All are susceptible to vancomycin. Resistance to penicillins & cephalosporins does occur.        Gram Stain Moderate (3+) WBCs per low power field      Few (2+) Gram variable bacilli    Wound Culture - Drainage, Neck [[id number]]  (Abnormal) Collected: 12/17/22 0014    Lab Status: Preliminary result Specimen: Drainage from Neck Updated: 12/18/22 1220     Wound Culture Scant growth (1+) Corynebacterium species     Comment:   No definitive guidelines. All are susceptible to vancomycin. Resistance to penicillins & cephalosporins does occur.        Gram Stain No WBCs or organisms seen    MRSA Screen, PCR (Inpatient) - Swab, Nares [[id number]]  (Normal) Collected: 12/17/22 0014    Lab Status: Final result Specimen: Swab from Nares Updated: 12/17/22 0815     MRSA PCR Negative    Narrative:      The negative predictive value of this diagnostic test is high and should only be used to consider de-escalating  anti-MRSA therapy. A positive result may indicate colonization with MRSA and must be correlated clinically.  MRSA Negative    Blood Culture - Blood, Arm, Left [[id number]]  (Normal) Collected: 12/16/22 1930    Lab Status: Preliminary result Specimen: Blood from Arm, Left Updated: 12/17/22 2000     Blood Culture No growth at 24 hours    Blood Culture - Blood, Arm, Left [[id number]]  (Normal) Collected: 12/16/22 1920    Lab Status: Preliminary result Specimen: Blood from Arm, Left Updated: 12/17/22 2000     Blood Culture No growth at 24 hours            Reported Vancomycin Levels    Results from last 7 days   Lab Units 12/18/22  1122 12/18/22  0149   VANCOMYCIN RM mcg/mL 10.00 28.90                      [employer] AUC Calculation:    Current AUC: 379 mg/L*hr    Predicted Steady State AUC on Current Dose: 447 mg/L*hr  _________________________________    Predicted Steady State AUC on New Dose: --- mg/L*hr    Assessment/Plan:  1. Pharmacy to dose vancomycin for SSTI. Goal -600 mg/L*hr.  2. Patient received a loading dose of vancomycin 1500 mg (~20 mg/kg) IV on 12/17 @ 0001, and was then initiated on a maintenance dose of vancomycin 750 mg IV Q12hr.  3. Due adequate renal function and predicted AUC at steady state, will continue current maintenance regimen of vancomycin 750 mg IV q12hr.  4. Reassess clearance by obtaining vancomycin random level on 12/20 @ 0800.  5. Pharmacy will continue to monitor renal function, cultures and sensitivities, and clinica l status to adjust regimen as necessary.    Thank you,  Troy Washington, [facility]  12/18/2022   13:24 EST

## 2022-12-18 NOTE — CONSULTS
Nutrition Services    Patient Name:  Lucian Bermudez  YOB: 1950  MRN: [id number]  Admit Date:  12/16/2022    Pt with reported aspiration event following bolus feeds. Discussed concerns with both pt and spouse at bedside. Suspect pt gave bolus too close together - additionally is usually up in a chair for bolus and at this time was laying in bed. Attempted to discuss options for nocturnal feeds to decrease total volume of bolus feeds - pt declined.     Recommendations:  · Up in a chair for all bolus feeds  · If pt has another aspiration event, would change regimen to bolus + nocturnal feeds to lower total volume during feeds while still meeting EEN.     RD will continue to monitor per protocol.     Electronically signed by:  Radha Vidal MS,SHEILA,LD  12/18/22 15:10 EST

## 2022-12-19 LAB
ALBUMIN SERPL-MCNC: 3.5 G/DL (ref 3.5–5.2)
ALBUMIN/GLOB SERPL: 1.3 G/DL
ALP SERPL-CCNC: 73 U/L (ref 39–117)
ALT SERPL W P-5'-P-CCNC: 17 U/L (ref 1–41)
ANION GAP SERPL CALCULATED.3IONS-SCNC: 9 MMOL/L (ref 5–15)
AST SERPL-CCNC: 29 U/L (ref 1–40)
BASOPHILS # BLD AUTO: 0.04 10*3/MM3 (ref 0–0.2)
BASOPHILS NFR BLD AUTO: 0.3 % (ref 0–1.5)
BILIRUB SERPL-MCNC: 1 MG/DL (ref 0–1.2)
BUN SERPL-MCNC: 23 MG/DL (ref 8–23)
BUN/CREAT SERPL: 31.5 (ref 7–25)
CALCIUM SPEC-SCNC: 8.9 MG/DL (ref 8.6–10.5)
CHLORIDE SERPL-SCNC: 99 MMOL/L (ref 98–107)
CO2 SERPL-SCNC: 31 MMOL/L (ref 22–29)
CREAT SERPL-MCNC: 0.73 MG/DL (ref 0.76–1.27)
D-LACTATE SERPL-SCNC: 0.8 MMOL/L (ref 0.5–2)
DEPRECATED RDW RBC AUTO: 46.3 FL (ref 37–54)
EGFRCR SERPLBLD CKD-EPI 2021: 97.9 ML/MIN/1.73
EOSINOPHIL # BLD AUTO: 0.16 10*3/MM3 (ref 0–0.4)
EOSINOPHIL NFR BLD AUTO: 1 % (ref 0.3–6.2)
ERYTHROCYTE [DISTWIDTH] IN BLOOD BY AUTOMATED COUNT: 12.9 % (ref 12.3–15.4)
GLOBULIN UR ELPH-MCNC: 2.8 GM/DL
GLUCOSE SERPL-MCNC: 100 MG/DL (ref 65–99)
HCT VFR BLD AUTO: 36.4 % (ref 37.5–51)
HGB BLD-MCNC: 11.6 G/DL (ref 13–17.7)
IMM GRANULOCYTES # BLD AUTO: 0.05 10*3/MM3 (ref 0–0.05)
IMM GRANULOCYTES NFR BLD AUTO: 0.3 % (ref 0–0.5)
LYMPHOCYTES # BLD AUTO: 0.75 10*3/MM3 (ref 0.7–3.1)
LYMPHOCYTES NFR BLD AUTO: 4.7 % (ref 19.6–45.3)
MCH RBC QN AUTO: 30.7 PG (ref 26.6–33)
MCHC RBC AUTO-ENTMCNC: 31.9 G/DL (ref 31.5–35.7)
MCV RBC AUTO: 96.3 FL (ref 79–97)
MONOCYTES # BLD AUTO: 0.61 10*3/MM3 (ref 0.1–0.9)
MONOCYTES NFR BLD AUTO: 3.8 % (ref 5–12)
NEUTROPHILS NFR BLD AUTO: 14.25 10*3/MM3 (ref 1.7–7)
NEUTROPHILS NFR BLD AUTO: 89.9 % (ref 42.7–76)
NRBC BLD AUTO-RTO: 0 /100 WBC (ref 0–0.2)
PLATELET # BLD AUTO: 218 10*3/MM3 (ref 140–450)
PMV BLD AUTO: 10.5 FL (ref 6–12)
POTASSIUM SERPL-SCNC: 3.4 MMOL/L (ref 3.5–5.2)
PROT SERPL-MCNC: 6.3 G/DL (ref 6–8.5)
RBC # BLD AUTO: 3.78 10*6/MM3 (ref 4.14–5.8)
SODIUM SERPL-SCNC: 139 MMOL/L (ref 136–145)
WBC NRBC COR # BLD: 15.86 10*3/MM3 (ref 3.4–10.8)

## 2022-12-19 PROCEDURE — 85025 COMPLETE CBC W/AUTO DIFF WBC: CPT | Performed by: INTERNAL MEDICINE

## 2022-12-19 PROCEDURE — 83605 ASSAY OF LACTIC ACID: CPT | Performed by: INTERNAL MEDICINE

## 2022-12-19 PROCEDURE — 25010000002 PIPERACILLIN SOD-TAZOBACTAM PER 1 G: Performed by: INTERNAL MEDICINE

## 2022-12-19 PROCEDURE — 80053 COMPREHEN METABOLIC PANEL: CPT | Performed by: INTERNAL MEDICINE

## 2022-12-19 PROCEDURE — 25010000002 VANCOMYCIN PER 500 MG: Performed by: INTERNAL MEDICINE

## 2022-12-19 PROCEDURE — 94799 UNLISTED PULMONARY SVC/PX: CPT

## 2022-12-19 PROCEDURE — 99232 SBSQ HOSP IP/OBS MODERATE 35: CPT | Performed by: INTERNAL MEDICINE

## 2022-12-19 PROCEDURE — 25010000002 ENOXAPARIN PER 10 MG: Performed by: INTERNAL MEDICINE

## 2022-12-19 RX ORDER — MIDODRINE HYDROCHLORIDE 5 MG/1
2.5 TABLET ORAL
Status: DISCONTINUED | OUTPATIENT
Start: 2022-12-19 | End: 2022-12-19

## 2022-12-19 RX ORDER — POTASSIUM CHLORIDE 1.5 G/1.77G
40 POWDER, FOR SOLUTION ORAL AS NEEDED
Status: DISCONTINUED | OUTPATIENT
Start: 2022-12-19 | End: 2022-12-21 | Stop reason: HOSPADM

## 2022-12-19 RX ORDER — PSEUDOEPHEDRINE HCL 30 MG
30 TABLET ORAL EVERY 8 HOURS PRN
Status: DISCONTINUED | OUTPATIENT
Start: 2022-12-19 | End: 2022-12-19

## 2022-12-19 RX ORDER — SODIUM CHLORIDE 9 MG/ML
100 INJECTION, SOLUTION INTRAVENOUS CONTINUOUS
Status: ACTIVE | OUTPATIENT
Start: 2022-12-19 | End: 2022-12-19

## 2022-12-19 RX ORDER — POTASSIUM CHLORIDE 7.45 MG/ML
10 INJECTION INTRAVENOUS
Status: DISCONTINUED | OUTPATIENT
Start: 2022-12-19 | End: 2022-12-19

## 2022-12-19 RX ORDER — MIDODRINE HYDROCHLORIDE 5 MG/1
2.5 TABLET ORAL 3 TIMES DAILY PRN
Status: DISCONTINUED | OUTPATIENT
Start: 2022-12-19 | End: 2022-12-21 | Stop reason: HOSPADM

## 2022-12-19 RX ORDER — ECHINACEA PURPUREA EXTRACT 125 MG
2 TABLET ORAL AS NEEDED
Status: DISCONTINUED | OUTPATIENT
Start: 2022-12-19 | End: 2022-12-21 | Stop reason: HOSPADM

## 2022-12-19 RX ORDER — POTASSIUM CHLORIDE 7.45 MG/ML
10 INJECTION INTRAVENOUS
Status: DISCONTINUED | OUTPATIENT
Start: 2022-12-19 | End: 2022-12-21 | Stop reason: HOSPADM

## 2022-12-19 RX ORDER — CHOLECALCIFEROL (VITAMIN D3) 125 MCG
5 CAPSULE ORAL NIGHTLY PRN
Status: DISCONTINUED | OUTPATIENT
Start: 2022-12-19 | End: 2022-12-21 | Stop reason: HOSPADM

## 2022-12-19 RX ORDER — PSEUDOEPHEDRINE HCL 30 MG
30 TABLET ORAL EVERY 8 HOURS PRN
Status: DISCONTINUED | OUTPATIENT
Start: 2022-12-19 | End: 2022-12-21 | Stop reason: HOSPADM

## 2022-12-19 RX ORDER — POTASSIUM CHLORIDE 750 MG/1
40 CAPSULE, EXTENDED RELEASE ORAL AS NEEDED
Status: DISCONTINUED | OUTPATIENT
Start: 2022-12-19 | End: 2022-12-19

## 2022-12-19 RX ORDER — SODIUM CHLORIDE/ALOE VERA
1 GEL (GRAM) NASAL
Status: DISCONTINUED | OUTPATIENT
Start: 2022-12-19 | End: 2022-12-21 | Stop reason: HOSPADM

## 2022-12-19 RX ORDER — HYDROXYZINE HYDROCHLORIDE 25 MG/1
25 TABLET, FILM COATED ORAL NIGHTLY PRN
Status: DISCONTINUED | OUTPATIENT
Start: 2022-12-19 | End: 2022-12-21 | Stop reason: HOSPADM

## 2022-12-19 RX ORDER — POTASSIUM CHLORIDE 1.5 G/1.77G
40 POWDER, FOR SOLUTION ORAL AS NEEDED
Status: DISCONTINUED | OUTPATIENT
Start: 2022-12-19 | End: 2022-12-19

## 2022-12-19 RX ORDER — HYDROXYZINE HYDROCHLORIDE 25 MG/1
25 TABLET, FILM COATED ORAL NIGHTLY PRN
Status: DISCONTINUED | OUTPATIENT
Start: 2022-12-19 | End: 2022-12-19

## 2022-12-19 RX ORDER — DEXTROMETHORPHAN POLISTIREX 30 MG/5ML
60 SUSPENSION ORAL EVERY 12 HOURS PRN
Status: DISCONTINUED | OUTPATIENT
Start: 2022-12-19 | End: 2022-12-21 | Stop reason: HOSPADM

## 2022-12-19 RX ADMIN — POTASSIUM CHLORIDE 40 MEQ: 1.5 POWDER, FOR SOLUTION ORAL at 15:56

## 2022-12-19 RX ADMIN — TAZOBACTAM SODIUM AND PIPERACILLIN SODIUM 3.38 G: 375; 3 INJECTION, SOLUTION INTRAVENOUS at 20:13

## 2022-12-19 RX ADMIN — PSEUDOEPHEDRINE HCL 30 MG: 30 TABLET, COATED ORAL at 15:56

## 2022-12-19 RX ADMIN — Medication 1 APPLICATION: at 10:42

## 2022-12-19 RX ADMIN — Medication 5 MG: at 21:29

## 2022-12-19 RX ADMIN — TAZOBACTAM SODIUM AND PIPERACILLIN SODIUM 3.38 G: 375; 3 INJECTION, SOLUTION INTRAVENOUS at 12:56

## 2022-12-19 RX ADMIN — ENOXAPARIN SODIUM 40 MG: 40 INJECTION SUBCUTANEOUS at 09:13

## 2022-12-19 RX ADMIN — DEXTROMETHORPHAN POLISTIREX 60 MG: 30 SUSPENSION ORAL at 00:05

## 2022-12-19 RX ADMIN — VANCOMYCIN HYDROCHLORIDE 750 MG: 750 INJECTION, SOLUTION INTRAVENOUS at 11:45

## 2022-12-19 RX ADMIN — SALINE NASAL SPRAY 2 SPRAY: 1.5 SOLUTION NASAL at 10:41

## 2022-12-19 RX ADMIN — Medication 1 PACKET: at 09:13

## 2022-12-19 RX ADMIN — HYDROXYZINE HYDROCHLORIDE 25 MG: 25 TABLET, FILM COATED ORAL at 21:29

## 2022-12-19 RX ADMIN — LEVOTHYROXINE SODIUM 75 MCG: 0.07 TABLET ORAL at 09:09

## 2022-12-19 RX ADMIN — ALBUTEROL SULFATE 2.5 MG: 2.5 SOLUTION RESPIRATORY (INHALATION) at 03:51

## 2022-12-19 RX ADMIN — DEXTROMETHORPHAN POLISTIREX 60 MG: 30 SUSPENSION ORAL at 11:46

## 2022-12-19 RX ADMIN — Medication 10 ML: at 09:09

## 2022-12-19 RX ADMIN — SODIUM CHLORIDE 100 ML/HR: 9 INJECTION, SOLUTION INTRAVENOUS at 11:08

## 2022-12-19 RX ADMIN — Medication 30 ML: at 09:13

## 2022-12-19 RX ADMIN — Medication 10 ML: at 20:15

## 2022-12-19 RX ADMIN — VANCOMYCIN HYDROCHLORIDE 750 MG: 750 INJECTION, SOLUTION INTRAVENOUS at 01:14

## 2022-12-19 RX ADMIN — TAZOBACTAM SODIUM AND PIPERACILLIN SODIUM 3.38 G: 375; 3 INJECTION, SOLUTION INTRAVENOUS at 04:10

## 2022-12-19 NOTE — PROGRESS NOTES
Nutrition Services    Patient Name:  Lucian Bermudez  YOB: 1950  MRN: [id number]  Admit Date:  12/16/2022      EMR reviewed.    4 cartons Boost Very High Calorie via PEG daily. At home normally does 2 cartons in the morning, 1 carton mid-day, and 1 carton at night. During each of the 3 feedings he also administers 16 ounces/2 cups of water. He reports he has been tolerating this for at least 2 years. Has noticed within the past 1 year that he sometimes will vomit which normally consists of some tube feeding. After much discussion with both patient and wife (on speaker phone) it seems that patient experiences some emesis after a severe fit of coughing. He denies ever feeling any nausea or abdominal pain prior to the emesis. Today he had a coughing fit and vomited a small amount. He reports this was 2 hours after his morning feeding. He is currently only administering 1 Boost VHC per feeding as he fears his coughing and emesis on a full stomach will result in aspiration. Wife reports patient is coughing more during this admission compared to at home. She also reports that what works for him at home (and what he is not receiving during this admission) is PRN sudafed 1-2x daily and nyquil at night as these help his nasal drip and significantly reduces coughing. RD communicated this information to hospitalist who plans to order these medications PRN.    Patient intends on administering one Boost VHC this afternoon and then one later tonight as long as his coughing isn't too bad.    RD will follow-up tomorrow.        Electronically signed by:  Snehal Mitcehll RD  12/19/22 14:43 EST

## 2022-12-19 NOTE — PROGRESS NOTES
INFECTIOUS DISEASE Progress note    Lucian Bermudez  1950  [id number]      Admission Date: 12/16/2022      Requesting Provider: No ref. provider found  Evaluating Physician: Ricardo Tabor MD    Reason for Consultation: neck abscess, right    History of present illness:    Patient is a 72 y.o. male, with PMH throat cancer, s/p chemo/XRT/radical neck 2021, evaluated 12/17/2022 for a neck abscess on the right.  He developed the right neck lesion several months ago, which has been spontaneously draining off and on. He was seen by his PCP for a wellness check yesterday, and complained of neck soreness, tenderness, and was referred to the ED. They attempted to drain the lesion obtaining purulent drainage. He has been afebrile. Admitting labs with ESR >>130, CRP 12.45, WBC 8.98, lactate 0.9, Scr 0.77, MRSA PCR negative. CT neck with rim enhancing fluid collection in the right base of neck soft tissues concerning for abscess ( 6x3.7x2.4)  with soft tissue stranding and inflammatory changes extending all along the right neck.  No evidence of deep soft tissue fluid collection. Frothy material seen within the hypopharynx which places the patient at risk of aspiration , diffuse scattered groundglass opacities throughout the biapical lungs. Gram stain of wound with no WBCs, no organisms, culture pending. Currently on Vancomycin and Zosyn and we were consulted for evaluation and treatment on 12/17/2022. He did have a low grade fever Monday with chills. No increased sputum production, nausea, vomiting. Complains of neck soreness, extending down shoulder and arm.   Dr. Langford, ENT, has obtained another culture, and is going to attempt to open the lesion.  He has no other localizing signs or symptoms of infection.  He denies ill contacts, TB or HIV.    12/18/22: History reviewed.  Feels better.  Continues on vancomycin and piperacillin tazobactam awaiting cultures.  Duration to be determined.  Tmax of 100.4.  Wound  culture pending. Blood cultures negative to date. Wound still draining seropurulent drainage. He denies n/v/d.  He thinks his neck is less sore today.  Has been hypotensive, got albumin last pm.     12/19/2022 history reviewed.  Tolerating his piperacillin tazobactam and vancomycin.  Slow improvement with neck pain.  No high fever.  Cultures from his neck growing corynebacterium.  MRSA negative.  Blood cultures negative to date.    Past Medical History:   Diagnosis Date    Cancer (HCC)     H/O colonoscopy     Hypothyroidism        Past Surgical History:   Procedure Laterality Date    CATARACT EXTRACTION      COLONOSCOPY      LYMPH NODE DISSECTION      PEG TUBE INSERTION  2000    PEG TUBE REPLACEMENT  2016       Family History   Problem Relation Age of Onset    Cancer Mother     Lung cancer Father     Cancer Father     Melanoma Sister     No Known Problems Son     No Known Problems Maternal Grandmother     No Known Problems Maternal Grandfather     No Known Problems Paternal Grandmother     No Known Problems Paternal Grandfather        Social History     Socioeconomic History    Marital status:    Tobacco Use    Smoking status: Never    Smokeless tobacco: Never   Vaping Use    Vaping Use: Never used   Substance and Sexual Activity    Alcohol use: Not Currently     Comment: occasional    Drug use: Never    Sexual activity: Yes     Partners: Female       No Known Allergies      Medication:    Current Facility-Administered Medications:     acetaminophen (TYLENOL) 160 MG/5ML solution 650 mg, 650 mg, Per G Tube, Q4H PRN, Keyla Yuen, DO, 650 mg at 12/18/22 1508    acetaminophen (TYLENOL) tablet 650 mg, 650 mg, Per G Tube, Q4H PRN, Keyla Yuen, DO    albuterol (PROVENTIL) nebulizer solution 0.083% 2.5 mg/3mL, 2.5 mg, Nebulization, Q4H PRN, Jalil Smith III, DO, 2.5 mg at 12/19/22 0351    dextromethorphan polistirex ER (DELSYM) 30 MG/5ML oral suspension 60 mg, 60 mg, Per PEG Tube, Q12H PRN, Jason  Rich CARTER, PharmD    Enoxaparin Sodium (LOVENOX) syringe 40 mg, 40 mg, Subcutaneous, Daily, Keyla Yuen DO, 40 mg at 12/19/22 0913    HYDROcodone-acetaminophen (NORCO) 5-325 MG per tablet 1 tablet, 1 tablet, Per G Tube, Q4H PRN, Keyla Yuen DO    hydrOXYzine (ATARAX) tablet 25 mg, 25 mg, Oral, Nightly PRN, Keyla Yuen DO    levothyroxine (SYNTHROID, LEVOTHROID) tablet 75 mcg, 75 mcg, Per G Tube, Daily, Keyla Yuen DO, 75 mcg at 12/19/22 0909    melatonin tablet 5 mg, 5 mg, Per PEG Tube, Nightly PRN, Rich Gomez, PharmD    midodrine (PROAMATINE) tablet 2.5 mg, 2.5 mg, Per PEG Tube, TID PRN, Keyla Yuen DO    morphine injection 1 mg, 1 mg, Intravenous, Q4H PRN **AND** naloxone (NARCAN) injection 0.4 mg, 0.4 mg, Intravenous, Q5 Min PRN, Jalil Smith III, DO    Nutrisource fiber pack 1 packet, 1 packet, Per G Tube, Daily, Radha Vidal MS,RD,LD, 1 packet at 12/19/22 0913    ondansetron (ZOFRAN) injection 4 mg, 4 mg, Intravenous, Q6H PRN, Jalil Smith III, DO    Pharmacy to dose vancomycin, , Does not apply, Continuous PRN, Roman Morin, DO    piperacillin-tazobactam (ZOSYN) 3.375 g in iso-osmotic dextrose 50 ml (premix), 3.375 g, Intravenous, Q8H, Roman Morin, DO, 3.375 g at 12/19/22 0410    ProSource No Carb oral solution 30 mL, 30 mL, Per G Tube, Daily, Radha Vidal MS,RD,LD, 30 mL at 12/19/22 0913    saline (AYR) nasal gel 1 application, 1 application, Topical, Q1H PRN, Keyla Yuen DO, 1 application at 12/19/22 1042    sodium chloride 0.9 % flush 10 mL, 10 mL, Intravenous, Q12H, Jalil Smith III, , 10 mL at 12/19/22 0909    sodium chloride 0.9 % flush 10 mL, 10 mL, Intravenous, PRN, Jalil Smith III,     sodium chloride 0.9 % infusion 40 mL, 40 mL, Intravenous, PRN, Jalil Smith III,     sodium chloride 0.9 % infusion, 100 mL/hr, Intravenous, Continuous, Keyla Yuen DO, Last Rate: 100 mL/hr at 12/19/22  1108, 100 mL/hr at 12/19/22 1108    sodium chloride nasal spray 2 spray, 2 spray, Each Nare, PRN, Keyla Yuen, , 2 spray at 12/19/22 1041    vancomycin in dextrose 5% 150 mL (VANCOCIN) IVPB 750 mg, 750 mg, Intravenous, Q12H, Roman Morin DO, 750 mg at 12/19/22 0114    Antibiotics:  Anti-Infectives (From admission, onward)      Ordered     Dose/Rate Route Frequency Start Stop    12/16/22 2216  vancomycin in dextrose 5% 150 mL (VANCOCIN) IVPB 750 mg        Ordering Provider: Roman Morin DO    750 mg Intravenous Every 12 Hours 12/17/22 1200 12/24/22 1159    12/16/22 2120  piperacillin-tazobactam (ZOSYN) 3.375 g in iso-osmotic dextrose 50 ml (premix)        Ordering Provider: Roman Morin DO    3.375 g  over 4 Hours Intravenous Every 8 Hours 12/17/22 0400 12/22/22 0359    12/16/22 2140  piperacillin-tazobactam (ZOSYN) 3.375 g in iso-osmotic dextrose 50 ml (premix)        Ordering Provider: Jalil Smith III, DO    3.375 g  over 30 Minutes Intravenous Once 12/16/22 2142 12/16/22 2216 12/16/22 2120  Pharmacy to dose vancomycin        Ordering Provider: Roman Morin DO     Does not apply Continuous PRN 12/16/22 2120 12/23/22 2119 12/16/22 2102  vancomycin 1500 mg/500 mL 0.9% NS IVPB ([facility])        Ordering Provider: Farooq Chaney PA    20 mg/kg × 74.4 kg Intravenous Once 12/16/22 2104 12/17/22 0001              Review of Systems:  Constitutional-- Low grade  Fever, chills no sweats.  Appetite fair, and no malaise. No fatigue.  HEENT-- No new vision, hearing or throat complaints.  No epistaxis or oral sores.  Denies odynophagia or dysphagia. No headache, photophobia or neck stiffness.  Neck drainage as per HPI.  CV-- No chest pain, palpitation or syncope  Resp-- No SOB/cough/Hemoptysis, no wheezes  GI- No nausea, vomiting, or diarrhea.  No hematochezia, melena, or hematemesis. Denies jaundice or chronic liver disease.  -- No dysuria, hematuria, or flank pain.  Denies hesitancy, urgency  or flank pain.  Lymph- no swollen lymph nodes in neck/axilla or groin.   Heme- No active bruising or bleeding; no Hx of DVT or PE.  MS-- + swelling or pain right neck, shoulder, arm.   No new back pain.  Neuro-- No acute focal weakness or numbness in the arms or legs.  No seizures.  Skin--No rashes.  Right neck lesion with some purulent drainage.  No crepitus or bullae.      Physical Exam:   Vital Signs  Temp (24hrs), Av.3 °F (36.3 °C), Min:95.6 °F (35.3 °C), Max:98.8 °F (37.1 °C)    Temp  Min: 95.6 °F (35.3 °C)  Max: 98.8 °F (37.1 °C)  BP  Min: 88/56  Max: 160/95  Pulse  Min: 63  Max: 93  Resp  Min: 18  Max: 24  SpO2  Min: 89 %  Max: 95 %    GENERAL: Awake and alert, in moderate distress.  Cachectic resting in bed.  Appears older than stated age.  HEENT: Normocephalic, atraumatic.   EOMI. No conjunctival injection. No icterus. Oropharynx clear without evidence of thrush or exudate. No evidence of peridontal disease.    NECK: Supple, right neck with decreased erythema and warmth along with radiation injury, no crepitus or bullae  HEART: RRR; No murmur, rubs, gallops.    LUNGS: Basilar rales, rhonchi. Normal respiratory effort. Nonlabored.  ABDOMEN: Soft, nontender, nondistended. Positive bowel sounds. No rebound or guarding.   EXT:  No cyanosis, clubbing or edema. No cord.  :  Without Venegas catheter.  MSK: No joint effusions or erythema  SKIN: Warm and dry.  Right neck lesion approximately 1cm in diameter, with copious seropurulent drainage, induration, tenderness  NEURO: Oriented to PPT.  Nonfocal  PSYCHIATRIC: Normal insight and judgment. Cooperative with PE    Laboratory Data    Results from last 7 days   Lab Units 22  0358 22  0149 22  0732   WBC 10*3/mm3 15.86* 18.87* 7.48   HEMOGLOBIN g/dL 11.6* 11.3* 12.5*   HEMATOCRIT % 36.4* 35.1* 39.5   PLATELETS 10*3/mm3 218 194 202     Results from last 7 days   Lab Units 22  0358   SODIUM mmol/L 139   POTASSIUM mmol/L 3.4*   CHLORIDE mmol/L  99   CO2 mmol/L 31.0*   BUN mg/dL 23   CREATININE mg/dL 0.73*   GLUCOSE mg/dL 100*   CALCIUM mg/dL 8.9     Results from last 7 days   Lab Units 12/19/22  0358   ALK PHOS U/L 73   BILIRUBIN mg/dL 1.0   ALT (SGPT) U/L 17   AST (SGOT) U/L 29     Results from last 7 days   Lab Units 12/16/22  1931   SED RATE mm/hr >130*     Results from last 7 days   Lab Units 12/16/22  1931   CRP mg/dL 12.45*     Results from last 7 days   Lab Units 12/19/22  0358   LACTATE mmol/L 0.8     Results from last 7 days   Lab Units 12/18/22  0149   CK TOTAL U/L 124     Results from last 7 days   Lab Units 12/18/22  1122 12/18/22  0149   VANCOMYCIN RM mcg/mL 10.00 28.90     Estimated Creatinine Clearance: 99.1 mL/min (A) (by C-G formula based on SCr of 0.73 mg/dL (L)).      Microbiology:  No results found for: ACANTHNAEG, AFBCX, BPERTUSSISCX, BLOODCX  No results found for: BCIDPCR, CXREFLEX, CSFCX, CULTURETIS  No results found for: CULTURES, HSVCX, URCX  No results found for: EYECULTURE, GCCX, HSVCULTURE, LABHSV  No results found for: LEGIONELLA, MRSACX, MUMPSCX, MYCOPLASCX  No results found for: NOCARDIACX, STOOLCX  No results found for: THROATCX, UNSTIMCULT, URINECX, CULTURE, VZVCULTUR  No results found for: VIRALCULTU, WOUNDCX        Radiology:  Imaging Results (Last 72 Hours)       Procedure Component Value Units Date/Time    XR Chest 1 View [[id number]] Collected: 12/17/22 1348     Updated: 12/17/22 1352    Narrative:      DATE OF EXAM:  12/17/2022 1:28 PM     PROCEDURE:  XR CHEST 1 VW-     INDICATIONS:  aspiration, hypoxia; L03.221-Cellulitis of neck; L02.11-Cutaneous  abscess of neck     COMPARISON:  Chest x-ray 11/10/2022     TECHNIQUE:   Single radiographic view of the chest was obtained.     FINDINGS:  Lungs normal expanded. Cardiomegaly. There is improved aeration of the  right lower lobe compared to previous study. There some linear opacity  in the right midlung right lower lobe likely due to scar. No new  parenchymal opacities.  Pulmonary vessels are distinct. No pneumothorax  or pleural effusion.       Impression:      Improved appearance of chest. The airspace opacity right lower lobe has  resolved. There is linear opacity at the lung bases likely due to scar.     This report was finalized on 12/17/2022 1:49 PM by Shayy Manuel MD.       CT Soft Tissue Neck With Contrast [[id number]] Collected: 12/16/22 2028     Updated: 12/16/22 2053    Addenda:        Findings of neck abscess and poor visualization of the right internal  jugular vein concerning for compression or thrombosis discussed with HAI MARTINI by Dr. Sony Paulson via telephone on 12/16/2022 8:50  PM.     This report was finalized on 12/16/2022 8:50 PM by Sony Paulson.     Signed: 12/16/22 2050 by Sony Paulson MD    Narrative:      DATE OF EXAM: 12/16/2022 8:06 PM     PROCEDURE: CT SOFT TISSUE NECK W CONTRAST-     INDICATIONS: abscess/infetion right side at base of neck     COMPARISON: No comparisons available.     TECHNIQUE: After the intravenous administration of 80 mL of Isovue 300,  contiguous axial images were obtained through the neck. Coronal and  sagittal reconstructions were performed. Automated exposure control and  iterative reconstruction methods were used.      The radiation dose reduction device was turned on for each scan per the  ALARA (As Low as Reasonably Achievable) protocol.     FINDINGS:   Soft tissues: Within the right base of neck soft tissues there is a  rim-enhancing collection with surrounding soft tissue stranding. This  collection measures approximately 6 x 3.7 x 2.4 cm. No evidence of large  rim-enhancing collection in the deeper soft tissues.     Pharynx/airway: There is frothy material seen within the hypopharynx.     Major salivary glands: Salivary glands are overall diminutive in size.     Thyroid: Normal.     Lymph nodes: Scattered subcentimeter lymph nodes are present in the neck  bilaterally, none of which is pathological by  size or morphologic  criteria.     Vessels: The visualized arteries appear patent. There is mild  atherosclerotic narrowing of the arteries. The right internal jugular  vein is not seen and may be thrombosed or compressed.     Bones: Degenerative changes of the cervical spine.     Lung apices: Diffuse scattered groundglass opacities throughout the  biapical lungs may represent infectious or inflammatory process.  Biapical pleural parenchymal scarring.     Visualized brain parenchyma: Normal.     Other: Left lens replacement.          Impression:      Rim-enhancing fluid collection in the right base of neck soft tissues  concerning for abscess with soft tissue stranding and inflammatory  changes extending all along the right neck. No evidence of deep soft  tissue fluid collection.     Poor visualization of the right internal jugular vein which may be  compressed or thrombosed.     Frothy material seen within the hypopharynx which places the patient at  risk for aspiration.     Diffuse scattered groundglass opacities throughout the biapical lungs  may represent infection     This report was finalized on 12/16/2022 8:42 PM by Sony Paulson.                 Impression:   - Right neck abscess and cellulitis, initial gram stain with no organisms, no cells, culture pending . MRSA PCR negative, growing corynebacterium.  - Throat cancer, s/p XRT, chemo/radical neck 2021, related to above issue.  - Hypopharynx with frothy materia, and scattered groundglass opacities biapically - increased risk of aspiration.  - Elevated inflammatory markers, secondary to above.  ESR greater than 130, C-reactive protein 12.45 on 12/16.  Blood cultures x2 negative on 12/162022.  -Hypoalbuminemia 2.90.  Mild to moderate malnutrition.  -Anemia, chronic disease.  Worse.  -Acute hypoxic respiratory failure, likely related to above issues and possible aspiration pneumonia.  6 L/min to 40 L/min nasal cannula.  -Leukocytosis, neutrophilic,  improved.  Related to above issues.  -Hypokalemia 3.4, new.    PLAN/RECOMMENDATIONS:     1.  Diagnostically, monitor blood cultures, physical examination, radiology imaging, CBC, CMP, ESR, CRP, lactic acid, and procalcitonin, culture which have been obtained.  2.  Therapeutically, continue vancomycin and piperacillin tazobactam (12/17-) pending cultures, and duration pending cultures and clinical improvement.  Likely to be switched to daptomycin and ertapenem for outpatient therapy.  3.  Continue supportive care, oxygen support therapy at 40 L/min on 12/17/2022.  6 L/min on 12/19.  4.  ENT surgery Dr. Liban Langford following patient.    I discussed the patients findings and my recommendations with patient, and the patient's wife.  Discussed outpt treatment.    Case management orders: Please arrange for home antibiotics, oral antibiotics at [facility] with daptomycin 500 mg IV daily, ertapenem 1 g IV daily until 1/17/2023 for neck abscess.  Check CBC, CMP, CRP weekly while on IV antibiotics.  Fax orders to [phone], call [phone] with final arrangements.  Arrange for follow-up with me in 2 weeks post discharge.    Our group would be pleased to follow this patient over the course of their hospitalization and assist with outpatient antimicrobial therapy, as indicated.  Further recommendations depend on the results of the cultures and clinical course.  Side effects of medications discussed.  Increased risk for adverse drug reactions, line complications, need for surgery, readmission.    Ricardo Tabor MD  12/19/2022  11:43 EST

## 2022-12-19 NOTE — PLAN OF CARE
Problem: Adult Inpatient Plan of Care  Goal: Plan of Care Review  Outcome: Ongoing, Progressing  Goal: Patient-Specific Goal (Individualized)  Outcome: Ongoing, Progressing  Goal: Absence of Hospital-Acquired Illness or Injury  Outcome: Ongoing, Progressing  Intervention: Identify and Manage Fall Risk  Recent Flowsheet Documentation  Taken 12/19/2022 0200 by Angy Layne RN  Safety Promotion/Fall Prevention:   clutter free environment maintained   safety round/check completed  Taken 12/19/2022 0000 by Angy Layne RN  Safety Promotion/Fall Prevention:   safety round/check completed   clutter free environment maintained  Taken 12/18/2022 2000 by Angy Layne RN  Safety Promotion/Fall Prevention:   safety round/check completed   clutter free environment maintained  Intervention: Prevent Infection  Recent Flowsheet Documentation  Taken 12/19/2022 0200 by Angy Layne RN  Infection Prevention:   environmental surveillance performed   hand hygiene promoted   rest/sleep promoted  Taken 12/19/2022 0000 by Angy Layne RN  Infection Prevention:   environmental surveillance performed   hand hygiene promoted   rest/sleep promoted  Taken 12/18/2022 2000 by Angy Layne RN  Infection Prevention:   environmental surveillance performed   hand hygiene promoted   rest/sleep promoted  Goal: Optimal Comfort and Wellbeing  Outcome: Ongoing, Progressing  Intervention: Provide Person-Centered Care  Recent Flowsheet Documentation  Taken 12/18/2022 2000 by Angy Layne RN  Trust Relationship/Rapport:   care explained   choices provided   questions answered   questions encouraged  Goal: Readiness for Transition of Care  Outcome: Ongoing, Progressing     Problem: Hypertension Comorbidity  Goal: Blood Pressure in Desired Range  Outcome: Ongoing, Progressing  Intervention: Maintain Blood Pressure Management  Recent Flowsheet Documentation  Taken 12/18/2022 2000 by Angy Layne RN  Medication  Review/Management: medications reviewed   Goal Outcome Evaluation:

## 2022-12-19 NOTE — PROGRESS NOTES
[facility] Medicine Services  PROGRESS NOTE    Patient Name: Lucian Bermudez  : 1950  MRN: [id number]    Date of Admission: 2022  Primary Care Physician: Wei Simmons MD    Subjective   Subjective     CC:  Neck abscess     HPI:  No acute events. States he is breathing better. Feels his nose is congested. Asking for something for sleep.     ROS:  Gen- No fevers, chills  CV- No chest pain, palpitations  Resp- No cough, dyspnea  GI- No N/V/D, abd pain     Objective   Objective     Vital Signs:   Temp:  [95.6 °F (35.3 °C)-98.8 °F (37.1 °C)] 95.6 °F (35.3 °C)  Heart Rate:  [63-93] 78  Resp:  [18-24] 18  BP: ()/(50-95) 94/59  Flow (L/min):  [6-45] 6     Physical Exam:  Constitutional: No acute distress, awake, alert; frail  HENT: NCAT, mucous membranes moist; serous fluid from neck I/D   Respiratory: Clear to auscultation bilaterally, respiratory effort normal   Cardiovascular: RRR, no murmurs, rubs, or gallops  Gastrointestinal: Positive bowel sounds, soft, nontender, nondistended; PEG in place  Musculoskeletal: No bilateral ankle edema  Psychiatric: Appropriate affect, cooperative  Neurologic: Oriented x 3, strength symmetric in all extremities, Cranial Nerves grossly intact to confrontation, speech hoarse   Skin: No rashes      Results Reviewed:  LAB RESULTS:      Lab 22  0358 22  1122 22  0149 22  0148 22  1832 22  0732 22  0018 22  1931   WBC 15.86*  --  18.87*  --   --  7.48  --  8.98   HEMOGLOBIN 11.6*  --  11.3*  --   --  12.5*  --  14.1   HEMATOCRIT 36.4*  --  35.1*  --   --  39.5  --  45.1   PLATELETS 218  --  194  --   --  202  --  227   NEUTROS ABS 14.25*  --  17.09*  --   --  5.90  --  7.11*   IMMATURE GRANS (ABS) 0.05  --  0.08*  --   --  0.07*  --  0.03   LYMPHS ABS 0.75  --  0.97  --   --  0.81  --  1.08   MONOS ABS 0.61  --  0.67  --   --  0.54  --  0.62   EOS ABS 0.16  --  0.02  --   --  0.14  --  0.12    MCV 96.3  --  96.2  --   --  96.1  --  96.6   SED RATE  --   --   --   --   --   --   --  >130*   CRP  --   --   --   --   --   --   --  12.45*   PROCALCITONIN  --   --  1.26*  --   --   --   --  0.08   LACTATE 0.8  --  1.1  --   --   --   --  0.9   PROTIME  --   --   --   --   --   --  14.8*  --    APTT  --   --   --   --   --   --  38.7*  --    HEPARIN ANTI-XA  --  0.11*  --  0.30 0.24* 0.18* 0.10*  --          Lab 12/19/22  0358 12/18/22  1122 12/18/22  0149 12/17/22  0732 12/16/22  1933 12/16/22  1931   SODIUM 139 141 140  140 137  --  138   POTASSIUM 3.4* 3.6 3.7  3.7 3.8  --  4.0   CHLORIDE 99 101 101  101 99  --  96*   CO2 31.0* 24.0 31.0*  31.0* 28.0  --  32.0*   ANION GAP 9.0 16.0* 8.0  8.0 10.0  --  10.0   BUN 23 27* 25*  25* 22  --  29*   CREATININE 0.73* 0.79 0.78  0.78 0.61* 0.80 0.80  0.77   EGFR 97.9  --  95.9 103.3  --  96.3   GLUCOSE 100* 99 125*  125* 96  --  108*   CALCIUM 8.9 8.9 8.5*  8.5* 8.8  --  9.9   MAGNESIUM  --  2.1 1.8 1.8  --   --    PHOSPHORUS  --  2.9 4.4  --   --   --          Lab 12/19/22  0358 12/18/22  1122 12/18/22  0149 12/17/22  0732 12/16/22  1931   TOTAL PROTEIN 6.3 6.7 6.4  6.4 7.3 8.5   ALBUMIN 3.50 3.30* 2.70*  2.70* 2.90* 3.90   GLOBULIN 2.8  --  3.7 4.4 4.6   ALT (SGPT) 17 16 16  16 14 19   AST (SGOT) 29 26 23  23 19 28   BILIRUBIN 1.0 1.0 1.1  1.1 1.0 1.1   ALK PHOS 73 67 69  69 78 90         Lab 12/17/22  0018   PROTIME 14.8*   INR 1.16*         Lab 12/18/22  1122 12/18/22  0149   CHOLESTEROL 83 87   TRIGLYCERIDES 52 53             Brief Urine Lab Results     None          Microbiology Results Abnormal     Procedure Component Value - Date/Time    Respiratory Culture - Sputum, Lung, Right Lower Lobe [[id number]] Collected: 12/18/22 0450    Lab Status: Preliminary result Specimen: Sputum from Lung, Right Lower Lobe Updated: 12/19/22 1142     Respiratory Culture Scant growth (1+) The culture consists of normal respiratory graciela. This is a preliminary  report; final report to follow.     Gram Stain Many (4+) WBCs per low power field      Rare (1+) Epithelial cells per low power field      Rare (1+) Mixed bacterial morphotypes seen on Gram Stain    Blood Culture - Blood, Arm, Left [[id number]]  (Normal) Collected: 12/16/22 1930    Lab Status: Preliminary result Specimen: Blood from Arm, Left Updated: 12/18/22 2000     Blood Culture No growth at 2 days    Blood Culture - Blood, Arm, Left [[id number]]  (Normal) Collected: 12/16/22 1920    Lab Status: Preliminary result Specimen: Blood from Arm, Left Updated: 12/18/22 2000     Blood Culture No growth at 2 days    Legionella Antigen, Urine - Urine, Urine, Catheter [[id number]]  (Normal) Collected: 12/18/22 0451    Lab Status: Edited Result - FINAL Specimen: Urine, Catheter Updated: 12/18/22 1354     LEGIONELLA ANTIGEN, URINE Negative    MRSA Screen, PCR (Inpatient) - Swab, Nares [[id number]]  (Normal) Collected: 12/17/22 0014    Lab Status: Final result Specimen: Swab from Nares Updated: 12/17/22 0815     MRSA PCR Negative    Narrative:      The negative predictive value of this diagnostic test is high and should only be used to consider de-escalating anti-MRSA therapy. A positive result may indicate colonization with MRSA and must be correlated clinically.  MRSA Negative          Duplex Venous Upper Extremity - Right CAR    Result Date: 12/18/2022  •  No evidence of deep venous thrombosis in the right upper extremity. •  Evidence of superficial venous thrombosis in the right upper extremity involving the cephalic vein in the forearm.  This appears acute. •  Abnormal continue venous flow in the right neck in the area of the internal jugular vein.  No thrombus is seen. •  Rouleaux flow noted at the cephalic vein in mid to distal forearm just above where the IV starts.           I have reviewed the medications:  Scheduled Meds:enoxaparin, 40 mg, Subcutaneous, Daily  levothyroxine, 75 mcg, Per G Tube, Daily  Nutrisource  fiber, 1 packet, Per G Tube, Daily  piperacillin-tazobactam, 3.375 g, Intravenous, Q8H  ProSource No Carb, 30 mL, Per G Tube, Daily  sodium chloride, 10 mL, Intravenous, Q12H  vancomycin, 750 mg, Intravenous, Q12H      Continuous Infusions:Pharmacy to dose vancomycin,   sodium chloride, 100 mL/hr, Last Rate: 100 mL/hr (12/19/22 1108)      PRN Meds:.•  acetaminophen  •  acetaminophen  •  albuterol  •  dextromethorphan polistirex ER  •  HYDROcodone-acetaminophen  •  hydrOXYzine  •  melatonin  •  midodrine  •  Morphine **AND** naloxone  •  ondansetron  •  Pharmacy to dose vancomycin  •  potassium chloride **OR** potassium chloride **OR** potassium chloride  •  saline  •  sodium chloride  •  sodium chloride  •  sodium chloride    Assessment & Plan   Assessment & Plan     Active Hospital Problems    Diagnosis  POA   • **Cellulitis and abscess of neck [L03.221, L02.11]  Yes   • Leukocytosis [D72.829]  Unknown   • Hypoxia [R09.02]  Unknown   • Moderate malnutrition (HCC) [E44.0]  Yes   • Abscess [L02.91]  Yes      Resolved Hospital Problems   No resolved problems to display.        Brief Hospital Course to date:  Lucian Bermudez is a 72 y.o. male with history of throat cancer and hypothyroid who presented with neck pain and abscess. Imaging with rim enhancing fluid collection in the right base of the neck concerning for abscess with no evidence of deep soft tissue fluid collection; R IJ vein compressed vs thrombosed. He was started on vanc and zosyn and admitted to medicine.     Neck abscess   Possible R IJ thrombosis   - Imaging per above  - BCx NGTD; MRSA negative; wound culture with corynebacterium   - ENT and ID consulted   - Continue vanc/zosyn -- management per ID   - Reviewed imaging with radiology -- uncertain if carotid and IJ will be able to be visualized with overlying abscess and extensive radiation damage with US. Duplex obtained and does show flow in the R IJ. Shows compression of the vessel but likely related  to extensive radiation damage. Initially on heparin gtt and now discontinued.     Bilateral upper lobe opacities   - CT with infectious vs inflammatory process  - Likely related to aspiration  - Continue vanc/zosyn     Leukocytosis   - Elevated following aspiration event. Improving.     Hypoxia   Aspiration event   - Following laying flat shortly after his bolus tube feeds x 2  - Currently down to 6L. Wean O2 as tolerated. Mobilize.     Hypothyroid   - Synthroid     History of throat cancer   - Nutrition consult. Has PEG     Expected Discharge Location and Transportation: home  Expected Discharge   Expected Discharge Date and Time     Expected Discharge Date Expected Discharge Time    Dec 20, 2022            DVT prophylaxis:  Medical DVT prophylaxis orders are present.          CODE STATUS:   Code Status and Medical Interventions:   Ordered at: 12/16/22 0320     Level Of Support Discussed With:    Patient     Code Status (Patient has no pulse and is not breathing):    CPR (Attempt to Resuscitate)     Medical Interventions (Patient has pulse or is breathing):    Full Support       Keyla Yuen,   12/19/22

## 2022-12-19 NOTE — CASE MANAGEMENT/SOCIAL WORK
Discharge Planning Assessment  [facility]     Patient Name: Lucian Bermudez  MRN: [id number]  Today's Date: 12/19/2022    Admit Date: 12/16/2022    Plan: Home with    Discharge Needs Assessment     Row Name 12/19/22 1629       Living Environment    People in Home spouse    Current Living Arrangements home    Primary Care Provided by self    Provides Primary Care For no one    Family Caregiver if Needed spouse;child(sharad), adult    Quality of Family Relationships helpful;involved;supportive       Resource/Environmental Concerns    Resource/Environmental Concerns none       Transition Planning    Patient/Family Anticipates Transition to home with family    Patient/Family Anticipated Services at Transition ;skilled nursing;home health care    Transportation Anticipated family or friend will provide       Discharge Needs Assessment    Readmission Within the Last 30 Days no previous admission in last 30 days    Equipment Currently Used at Home nebulizer    Concerns to be Addressed discharge planning    Anticipated Changes Related to Illness none    Equipment Needed After Discharge none    Outpatient/Agency/Support Group Needs homecare agency    Discharge Facility/Level of Care Needs home with home health               Discharge Plan     Row Name 12/19/22 8639       Plan    Plan Home with     Patient/Family in Agreement with Plan yes    Plan Comments Met & spoke with Mr Bermudez and his spouse at the bedside. He lives with his spouse in [place] At baseline, is ind with ADL's/mobility. Drives. Has a nebulizer. Had HH many years ago. Denies rehab/O2. POA is his spouse. Has a living will. CM consult received regarding IV abx therapy. Spouse plans on doing abx therapy at home through [facility] teach/dispense. Duration of abx therapy will go through 1/17/2023. I asked which  agency they prefered and they didn't have one. CM will call a  a referral to [facility] for SN. Per secure chat with Dr Tabor, Mr Bermudez  will be having a single PICC placed. Plan is home with HH and his spouse will transport.    Final Discharge Disposition Code 30 - still a patient              Continued Care and Services - Admitted Since 12/16/2022     Dialysis/Infusion Coordination complete.    Service Provider Request Status Selected Services Address Phone Fax Patient Preferred    [facility]  Selected Infusion and [facility] [address], [facility] [address] [phone] --              Expected Discharge Date and Time     Expected Discharge Date Expected Discharge Time    Dec 20, 2022          Demographic Summary     Row Name 12/19/22 1628       General Information    Admission Type inpatient    Reason for Consult discharge planning    General Information Comments Verified PCP is Dr Wei Simmons. Primary insurance is Medicare A/B. Secondary is [place]. Has drug coverage.       Contact Information    Permission Granted to Share Info With     Contact Information Obtained for                Functional Status     Row Name 12/19/22 1629       Functional Status    Usual Activity Tolerance good    Current Activity Tolerance good       Functional Status, IADL    Medications independent    Meal Preparation independent    Housekeeping independent    Laundry independent    Shopping independent               Psychosocial    No documentation.                Abuse/Neglect    No documentation.                Legal    No documentation.                Substance Abuse    No documentation.                Patient Forms    No documentation.                   Lacey Patrick RN

## 2022-12-20 LAB
ALBUMIN SERPL-MCNC: 3.2 G/DL (ref 3.5–5.2)
ALBUMIN/GLOB SERPL: 0.9 G/DL
ALP SERPL-CCNC: 76 U/L (ref 39–117)
ALT SERPL W P-5'-P-CCNC: 18 U/L (ref 1–41)
ANION GAP SERPL CALCULATED.3IONS-SCNC: 7 MMOL/L (ref 5–15)
AST SERPL-CCNC: 26 U/L (ref 1–40)
BACTERIA SPEC AEROBE CULT: ABNORMAL
BACTERIA SPEC AEROBE CULT: ABNORMAL
BACTERIA SPEC RESP CULT: NORMAL
BASOPHILS # BLD AUTO: 0.02 10*3/MM3 (ref 0–0.2)
BASOPHILS NFR BLD AUTO: 0.3 % (ref 0–1.5)
BILIRUB SERPL-MCNC: 0.7 MG/DL (ref 0–1.2)
BUN SERPL-MCNC: 18 MG/DL (ref 8–23)
BUN/CREAT SERPL: 29.5 (ref 7–25)
CALCIUM SPEC-SCNC: 8.6 MG/DL (ref 8.6–10.5)
CHLORIDE SERPL-SCNC: 100 MMOL/L (ref 98–107)
CK SERPL-CCNC: 71 U/L (ref 20–200)
CO2 SERPL-SCNC: 31 MMOL/L (ref 22–29)
CREAT SERPL-MCNC: 0.61 MG/DL (ref 0.76–1.27)
D-LACTATE SERPL-SCNC: 0.6 MMOL/L (ref 0.5–2)
DEPRECATED RDW RBC AUTO: 46.4 FL (ref 37–54)
EGFRCR SERPLBLD CKD-EPI 2021: 103.3 ML/MIN/1.73
EOSINOPHIL # BLD AUTO: 0.48 10*3/MM3 (ref 0–0.4)
EOSINOPHIL NFR BLD AUTO: 6.3 % (ref 0.3–6.2)
ERYTHROCYTE [DISTWIDTH] IN BLOOD BY AUTOMATED COUNT: 12.7 % (ref 12.3–15.4)
GLOBULIN UR ELPH-MCNC: 3.5 GM/DL
GLUCOSE SERPL-MCNC: 82 MG/DL (ref 65–99)
GRAM STN SPEC: ABNORMAL
GRAM STN SPEC: NORMAL
HCT VFR BLD AUTO: 35 % (ref 37.5–51)
HGB BLD-MCNC: 10.9 G/DL (ref 13–17.7)
IMM GRANULOCYTES # BLD AUTO: 0.03 10*3/MM3 (ref 0–0.05)
IMM GRANULOCYTES NFR BLD AUTO: 0.4 % (ref 0–0.5)
LYMPHOCYTES # BLD AUTO: 0.83 10*3/MM3 (ref 0.7–3.1)
LYMPHOCYTES NFR BLD AUTO: 11 % (ref 19.6–45.3)
MCH RBC QN AUTO: 30.5 PG (ref 26.6–33)
MCHC RBC AUTO-ENTMCNC: 31.1 G/DL (ref 31.5–35.7)
MCV RBC AUTO: 98 FL (ref 79–97)
MONOCYTES # BLD AUTO: 0.55 10*3/MM3 (ref 0.1–0.9)
MONOCYTES NFR BLD AUTO: 7.3 % (ref 5–12)
NEUTROPHILS NFR BLD AUTO: 5.66 10*3/MM3 (ref 1.7–7)
NEUTROPHILS NFR BLD AUTO: 74.7 % (ref 42.7–76)
NRBC BLD AUTO-RTO: 0 /100 WBC (ref 0–0.2)
PLATELET # BLD AUTO: 190 10*3/MM3 (ref 140–450)
PMV BLD AUTO: 10.5 FL (ref 6–12)
POTASSIUM SERPL-SCNC: 3.6 MMOL/L (ref 3.5–5.2)
POTASSIUM SERPL-SCNC: 3.8 MMOL/L (ref 3.5–5.2)
PROT SERPL-MCNC: 6.7 G/DL (ref 6–8.5)
QT INTERVAL: 390 MS
QTC INTERVAL: 432 MS
RBC # BLD AUTO: 3.57 10*6/MM3 (ref 4.14–5.8)
SODIUM SERPL-SCNC: 138 MMOL/L (ref 136–145)
WBC NRBC COR # BLD: 7.57 10*3/MM3 (ref 3.4–10.8)

## 2022-12-20 PROCEDURE — 84132 ASSAY OF SERUM POTASSIUM: CPT | Performed by: STUDENT IN AN ORGANIZED HEALTH CARE EDUCATION/TRAINING PROGRAM

## 2022-12-20 PROCEDURE — 83605 ASSAY OF LACTIC ACID: CPT | Performed by: INTERNAL MEDICINE

## 2022-12-20 PROCEDURE — 93010 ELECTROCARDIOGRAM REPORT: CPT | Performed by: INTERNAL MEDICINE

## 2022-12-20 PROCEDURE — 25010000002 ENOXAPARIN PER 10 MG: Performed by: INTERNAL MEDICINE

## 2022-12-20 PROCEDURE — 25010000002 PIPERACILLIN SOD-TAZOBACTAM PER 1 G: Performed by: INTERNAL MEDICINE

## 2022-12-20 PROCEDURE — 85025 COMPLETE CBC W/AUTO DIFF WBC: CPT | Performed by: INTERNAL MEDICINE

## 2022-12-20 PROCEDURE — 80053 COMPREHEN METABOLIC PANEL: CPT | Performed by: INTERNAL MEDICINE

## 2022-12-20 PROCEDURE — 25010000002 DAPTOMYCIN PER 1 MG: Performed by: INTERNAL MEDICINE

## 2022-12-20 PROCEDURE — 25010000002 VANCOMYCIN PER 500 MG: Performed by: INTERNAL MEDICINE

## 2022-12-20 PROCEDURE — 99232 SBSQ HOSP IP/OBS MODERATE 35: CPT | Performed by: STUDENT IN AN ORGANIZED HEALTH CARE EDUCATION/TRAINING PROGRAM

## 2022-12-20 PROCEDURE — 25010000002 ERTAPENEM PER 500 MG: Performed by: INTERNAL MEDICINE

## 2022-12-20 PROCEDURE — 93005 ELECTROCARDIOGRAM TRACING: CPT | Performed by: PHYSICIAN ASSISTANT

## 2022-12-20 PROCEDURE — 82550 ASSAY OF CK (CPK): CPT | Performed by: INTERNAL MEDICINE

## 2022-12-20 RX ADMIN — ERTAPENEM 1 G: 1 INJECTION INTRAMUSCULAR; INTRAVENOUS at 13:17

## 2022-12-20 RX ADMIN — Medication 1 PACKET: at 10:10

## 2022-12-20 RX ADMIN — ENOXAPARIN SODIUM 40 MG: 40 INJECTION SUBCUTANEOUS at 10:09

## 2022-12-20 RX ADMIN — PSEUDOEPHEDRINE HCL 30 MG: 30 TABLET, COATED ORAL at 21:16

## 2022-12-20 RX ADMIN — Medication 10 ML: at 10:09

## 2022-12-20 RX ADMIN — LEVOTHYROXINE SODIUM 75 MCG: 0.07 TABLET ORAL at 10:08

## 2022-12-20 RX ADMIN — DAPTOMYCIN 450 MG: 500 INJECTION, POWDER, LYOPHILIZED, FOR SOLUTION INTRAVENOUS at 13:51

## 2022-12-20 RX ADMIN — TAZOBACTAM SODIUM AND PIPERACILLIN SODIUM 3.38 G: 375; 3 INJECTION, SOLUTION INTRAVENOUS at 03:35

## 2022-12-20 RX ADMIN — Medication 5 MG: at 21:16

## 2022-12-20 RX ADMIN — VANCOMYCIN HYDROCHLORIDE 750 MG: 750 INJECTION, SOLUTION INTRAVENOUS at 00:29

## 2022-12-20 RX ADMIN — Medication 30 ML: at 10:10

## 2022-12-20 RX ADMIN — POTASSIUM CHLORIDE 40 MEQ: 1.5 POWDER, FOR SOLUTION ORAL at 10:08

## 2022-12-20 RX ADMIN — HYDROXYZINE HYDROCHLORIDE 25 MG: 25 TABLET, FILM COATED ORAL at 21:15

## 2022-12-20 RX ADMIN — Medication 10 ML: at 20:29

## 2022-12-20 RX ADMIN — PSEUDOEPHEDRINE HCL 30 MG: 30 TABLET, COATED ORAL at 13:21

## 2022-12-20 RX ADMIN — POTASSIUM CHLORIDE 40 MEQ: 1.5 POWDER, FOR SOLUTION ORAL at 13:17

## 2022-12-20 NOTE — PROGRESS NOTES
Clinical Nutrition     Nutrition Support Assessment  Reason for Visit: Follow-up protocol, EN      Patient Name: Lucian Bermudez  YOB: 1950  MRN: [id number]  Date of Encounter: 12/20/22 13:26 EST  Admission date: 12/16/2022      Comments:  -Continue current bolus EN regimen at this time:  Boost Very High Calorie  3 feedings (times up to patient)  AM feeding = 2 cartons Boost, 300 ml water flush  Afternoon feeding = 1 carton Boost, 300 ml water flush  PM feeding = 1 carton Boost, 300 ml water flush    -If patient unable to tolerate 4 cartons total daily within next 1-2 days, RD will discuss alternative EN plan to provide the calories/protein to meet remainder of patient's nutritional needs.      Nutrition Assessment   Admission Diagnosis:  Cellulitis and abscess of neck [L03.221, L02.11]  Abscess [L02.91]      Problem List:    Cellulitis and abscess of neck    Abscess    Moderate malnutrition (HCC)    Leukocytosis    Hypoxia    Non-severe, Chronic Malnutrition (dx per RD 12/17)    Aspiration event (12/18)      PMH:   He  has a past medical history of Cancer (HCC), H/O colonoscopy, and Hypothyroidism.  PSH:  He  has a past surgical history that includes Colonoscopy; Cataract extraction; Lymph node dissection; Peg Tube Insertion (2000); and Peg Tube Replacement (2016).      Applicable Nutrition Concerns:   Skin:  Oral:  GI: PEG      Applicable Interval History:   (12/17) s/p aspiration of abscess      Reported/Observed/Food/Nutrition Related History:   12/20) Patient sitting up in bedside chair watching TV. Patient reports he bolused only 1 carton yesterday after RD visit (total of 2 for the day). Received sudafed yesterday afternoon. Reports he plans on asking for one today. Reports he tolerated his morning feeding/bolus earlier and had had no issue with coughing until 20 minutes prior to this visit (coughed so much that some stuff came up). Pt describes it more as reflux sensation  rather than vomiting sensation. RD brought patient some more cartons of Boost VHC as he no longer had any stocked in his room. RD spoke with pharmacist who reports no Nyquil on formulary. BM x1 within past 24 hours per I/Os (verified by pt).  Patient administering bolus feedings himself.    12/17) Pt with long term dysphagia 2/2 head/nech cancer followed by RDs at [facility]. Pt has been using Boost VHC to maintain weight - originally using ~6 can daily but had significant N/V and bloating. Currently uses ~4 cans daily and has used this regimen for ~1yr - discussed possibility of changing regimen to decrease volume but after evaluation this will be best option for pt. Currently has many cases. Reports being very active. Remains NPO. Endorsed inconsistent bowels and takes benefiber daily at home in addition to Vit C 1g. Last bolus at 2pm yesterday. Endorsed occasional vomiting following coughing fits.        Labs    Labs Reviewed: Yes     Results from last 7 days   Lab Units 12/20/22  0353 12/19/22  0358 12/18/22  1122 12/18/22  0149 12/17/22  0732   GLUCOSE mg/dL 82 100* 99 125*  125* 96   BUN mg/dL 18 23 27* 25*  25* 22   CREATININE mg/dL 0.61* 0.73* 0.79 0.78  0.78 0.61*   SODIUM mmol/L 138 139 141 140  140 137   CHLORIDE mmol/L 100 99 101 101  101 99   POTASSIUM mmol/L 3.6 3.4* 3.6 3.7  3.7 3.8   PHOSPHORUS mg/dL  --   --  2.9 4.4  --    MAGNESIUM mg/dL  --   --  2.1 1.8 1.8   ALT (SGPT) U/L 18 17 16 16  16 14       Results from last 7 days   Lab Units 12/20/22  0353 12/19/22  0358 12/18/22  1122 12/18/22  0149 12/17/22  0732 12/16/22  1931   ALBUMIN g/dL 3.20* 3.50 3.30* 2.70*  2.70*   < > 3.90   CRP mg/dL  --   --   --   --   --  12.45*   CHOLESTEROL mg/dL  --   --  83 87  --   --    TRIGLYCERIDES mg/dL  --   --  52 53  --   --     < > = values in this interval not displayed.       Medications    Medications Reviewed: Yes  Scheduled: antibiotic  GTT:  PRN:    Intake/Ouptut 24 hrs (7:00AM - 6:59 AM)   I/Os  "Reviewed: Yes     Anthropometrics     Flowsheet Rows    Flowsheet Row First Filed Value   Admission Height 182.9 cm (72\") Documented at 12/16/2022 1635   Admission Weight 74.4 kg (164 lb) Documented at 12/16/2022 1635          Height: Height: 180 cm (70.87\")  Last Filed Weight: Weight: 74.4 kg (164 lb) (12/18/22 1023)  Method: Weight Method: Stated  BMI: BMI (Calculated): 23  BMI classification: Normal: 18.5-24.9kg/m2  IBW:  171lbs    UBW:     Last 15 Recorded Weights  View Complete Flowsheet  Weight Weight (kg) Weight (lbs) Weight Method VISIT REPORT   12/16/2022 74.39 kg 164 lb Stated Report   12/16/2022 73.936 kg 163 lb - Report   11/10/2022 75.479 kg 166 lb 6.4 oz - Report   12/9/2021 76.204 kg 168 lb - Report   5/10/2021 75.569 kg 166 lb 9.6 oz - Report   12/16/2020 71.668 kg 158 lb - Report   7/23/2020 74.39 kg 164 lb - Report   6/19/2019 79.4 kg 175 lb 0.7 oz - Report   6/5/2019 79.4 kg 175 lb 0.7 oz - Report   5/8/2019 79.4 kg 175 lb 0.7 oz - Report   4/25/2019 79.379 kg 175 lb - Report   4/17/2019 77.565 kg 171 lb - Report   8/31/2018 83.462 kg 184 lb - Report   4/18/2017 80.74 kg 178 lb - Report   10/11/2016 80.74 kg 178 lb - Report     Weight change:   No documented weight change    Nutrition Focused Physical Exam     Date: 12/17  Patient meets criteria for malnutrition diagnosis for mild/moderate wasting/fat loss, see MSA note.    Needs Assessment   Date: 12/17    Height used:Height: 180 cm (70.87\")  Weights used: Weight: 74.4 kg (164 lb) (12/18/22 1023)  IBW:  78kg       Estimated calorie needs: ~ 2100 Kcal/day  Method:Kcals/KG 25-30 = 6794-7633  Method:MSJ x 1.3 = 2000    Estimated protein needs: ~ 115g PRO/day  Method: 1.3-1.5g/Kg IBW = 105-122     Estimated fluid needs: Per clinical status      Current Nutrition Prescription     PO: No diet orders on file  Intake: N/A     EN: Boost Very High Calorie  Goal regimen: 4 feedings daily. AM feeding = 2 cartons Boost VHC. Afternoon feeding + PM feeding = 1 " carton Boost VHC. Water Flushes: 300 mL q bolus feeding  Modular: Prosource -no carb 1/day, Nutrisource fiber 1/day  Route: PEG  Verified at bedside: No - bolus regimen    At goal over 12 hours (8am to 8pm):  Rx will supply:   Goal Volume 948 mL/day     Flush Volume 1200 mL/day     Energy 2195 Kcal/day 105 % Est Need   Protein 103 g/day 89 % Est Need   Fiber 3 g/day     Water in   mL     Total Water 1854 mL     Meet DRI Yes          EN delivery: 1 packet Prosource and 1 packet Nutrisource Fiber delivered past 2 days as ordered. Insufficient doc for EN formula. Based on RD/pt discussion, pt received only 2 cartons Boost VHC yesterday 2/2 coughing/emesis/reflux.    Yesterday's nutrition intake = 1135 calories (54% est needs), 59 g protein (% est needs)      Nutrition Diagnosis     Date: 12/17 Updated:   Problem Malnutrition non-severe   Etiology Inadequate protein energy intake in the context of chronic illness   Signs/Symptoms Mild/moderate muscle and subcutaneous fat loss   Status: ongoing    Date:  12/17 Updated: 12/20  Problem Swallowing difficulty   Etiology Head/neck cancer hx   Signs/Symptoms Long term NPO with EN via PEG   Status: ongoing - EN initiated (12/17)    Goal:   General: Nutrition support treatment  PO: N/A  EN/PN: Maintain EN, Tolerate EN at goal volume    Nutrition Intervention   Follow treatment progress, Care plan reviewed, Nutrition support order placed      -Continue current bolus EN regimen at this time.    -If patient unable to tolerate 4 cartons total daily within next 1-2 days, RD will discuss alternative EN plan to provide the calories/protein to meet remainder of patient's nutritional needs.      Monitoring/Evaluation:   Per protocol, I&O, Pertinent labs, EN delivery/tolerance, Weight, Skin status, GI status, Symptoms      Snehal Mitchell RD  Time Spent: 45min

## 2022-12-20 NOTE — PROGRESS NOTES
[facility]  PROGRESS NOTE    Patient Name: Lucian Bermudez  : 1950  MRN: [id number]    Date of Admission: 2022  Primary Care Physician: Wei Simmons MD    Subjective   Subjective     CC:  Neck abscess     HPI:  No acute overnight events. Breathing ok, mouth feels dry. D/w wife, overall stable and doing well    ROS:  Gen- No fevers, chills  CV- No chest pain, palpitations  Resp- No cough, dyspnea  GI- No N/V/D, abd pain     Objective   Objective     Vital Signs:   Temp:  [95.6 °F (35.3 °C)-98 °F (36.7 °C)] 95.8 °F (35.4 °C)  Heart Rate:  [37-84] 78  Resp:  [14-18] 14  BP: ()/(59-99) 111/62  Flow (L/min):  [6] 6     Physical Exam:  Constitutional: No acute distress, awake, alert; frail  HENT: NCAT, mucous membranes moist; serous fluid from neck I/D . Guaze in place  Respiratory: Clear to auscultation bilaterally, respiratory effort normal   Cardiovascular: RRR, no murmurs, rubs, or gallops  Gastrointestinal: Positive bowel sounds, soft, nontender, nondistended; PEG in place  Musculoskeletal: No bilateral ankle edema  Psychiatric: Appropriate affect, cooperative  Neurologic: Oriented x 3, strength symmetric in all extremities, Cranial Nerves grossly intact to confrontation, speech hoarse   Skin: No rashes      Results Reviewed:  LAB RESULTS:      Lab 22  0353 22  0358 22  1122 22  0149 22  0148 22  1832 22  0732 22  0018 22  1931   WBC 7.57 15.86*  --  18.87*  --   --  7.48  --  8.98   HEMOGLOBIN 10.9* 11.6*  --  11.3*  --   --  12.5*  --  14.1   HEMATOCRIT 35.0* 36.4*  --  35.1*  --   --  39.5  --  45.1   PLATELETS 190 218  --  194  --   --  202  --  227   NEUTROS ABS 5.66 14.25*  --  17.09*  --   --  5.90  --  7.11*   IMMATURE GRANS (ABS) 0.03 0.05  --  0.08*  --   --  0.07*  --  0.03   LYMPHS ABS 0.83 0.75  --  0.97  --   --  0.81  --  1.08   MONOS ABS 0.55 0.61  --  0.67  --   --  0.54  --   0.62   EOS ABS 0.48* 0.16  --  0.02  --   --  0.14  --  0.12   MCV 98.0* 96.3  --  96.2  --   --  96.1  --  96.6   SED RATE  --   --   --   --   --   --   --   --  >130*   CRP  --   --   --   --   --   --   --   --  12.45*   PROCALCITONIN  --   --   --  1.26*  --   --   --   --  0.08   LACTATE 0.6 0.8  --  1.1  --   --   --   --  0.9   PROTIME  --   --   --   --   --   --   --  14.8*  --    APTT  --   --   --   --   --   --   --  38.7*  --    HEPARIN ANTI-XA  --   --  0.11*  --  0.30 0.24* 0.18* 0.10*  --          Lab 12/20/22  0353 12/19/22  0358 12/18/22  1122 12/18/22  0149 12/17/22  0732 12/16/22  1933 12/16/22  1931   SODIUM 138 139 141 140  140 137  --  138   POTASSIUM 3.6 3.4* 3.6 3.7  3.7 3.8  --  4.0   CHLORIDE 100 99 101 101  101 99  --  96*   CO2 31.0* 31.0* 24.0 31.0*  31.0* 28.0  --  32.0*   ANION GAP 7.0 9.0 16.0* 8.0  8.0 10.0  --  10.0   BUN 18 23 27* 25*  25* 22  --  29*   CREATININE 0.61* 0.73* 0.79 0.78  0.78 0.61*   < > 0.80  0.77   EGFR 103.3 97.9  --  95.9 103.3  --  96.3   GLUCOSE 82 100* 99 125*  125* 96  --  108*   CALCIUM 8.6 8.9 8.9 8.5*  8.5* 8.8  --  9.9   MAGNESIUM  --   --  2.1 1.8 1.8  --   --    PHOSPHORUS  --   --  2.9 4.4  --   --   --     < > = values in this interval not displayed.         Lab 12/20/22  0353 12/19/22  0358 12/18/22  1122 12/18/22  0149 12/17/22  0732 12/16/22  1931   TOTAL PROTEIN 6.7 6.3 6.7 6.4  6.4 7.3 8.5   ALBUMIN 3.20* 3.50 3.30* 2.70*  2.70* 2.90* 3.90   GLOBULIN 3.5 2.8  --  3.7 4.4 4.6   ALT (SGPT) 18 17 16 16  16 14 19   AST (SGOT) 26 29 26 23  23 19 28   BILIRUBIN 0.7 1.0 1.0 1.1  1.1 1.0 1.1   ALK PHOS 76 73 67 69  69 78 90         Lab 12/17/22  0018   PROTIME 14.8*   INR 1.16*         Lab 12/18/22  1122 12/18/22  0149   CHOLESTEROL 83 87   TRIGLYCERIDES 52 53             Brief Urine Lab Results     None          Microbiology Results Abnormal     Procedure Component Value - Date/Time    Blood Culture - Blood, Arm, Left [[id number]]   (Normal) Collected: 12/16/22 1930    Lab Status: Preliminary result Specimen: Blood from Arm, Left Updated: 12/19/22 2000     Blood Culture No growth at 3 days    Blood Culture - Blood, Arm, Left [[id number]]  (Normal) Collected: 12/16/22 1920    Lab Status: Preliminary result Specimen: Blood from Arm, Left Updated: 12/19/22 2000     Blood Culture No growth at 3 days    Respiratory Culture - Sputum, Lung, Right Lower Lobe [[id number]] Collected: 12/18/22 0450    Lab Status: Preliminary result Specimen: Sputum from Lung, Right Lower Lobe Updated: 12/19/22 1142     Respiratory Culture Scant growth (1+) The culture consists of normal respiratory graciela. This is a preliminary report; final report to follow.     Gram Stain Many (4+) WBCs per low power field      Rare (1+) Epithelial cells per low power field      Rare (1+) Mixed bacterial morphotypes seen on Gram Stain    Legionella Antigen, Urine - Urine, Urine, Catheter [[id number]]  (Normal) Collected: 12/18/22 0451    Lab Status: Edited Result - FINAL Specimen: Urine, Catheter Updated: 12/18/22 1354     LEGIONELLA ANTIGEN, URINE Negative    MRSA Screen, PCR (Inpatient) - Swab, Nares [[id number]]  (Normal) Collected: 12/17/22 0014    Lab Status: Final result Specimen: Swab from Nares Updated: 12/17/22 0815     MRSA PCR Negative    Narrative:      The negative predictive value of this diagnostic test is high and should only be used to consider de-escalating anti-MRSA therapy. A positive result may indicate colonization with MRSA and must be correlated clinically.  MRSA Negative          Duplex Venous Upper Extremity - Right CAR    Result Date: 12/18/2022  •  No evidence of deep venous thrombosis in the right upper extremity. •  Evidence of superficial venous thrombosis in the right upper extremity involving the cephalic vein in the forearm.  This appears acute. •  Abnormal continue venous flow in the right neck in the area of the internal jugular vein.  No thrombus is seen.  •  Rouleaux flow noted at the cephalic vein in mid to distal forearm just above where the IV starts.           I have reviewed the medications:  Scheduled Meds:enoxaparin, 40 mg, Subcutaneous, Daily  levothyroxine, 75 mcg, Per G Tube, Daily  Nutrisource fiber, 1 packet, Per G Tube, Daily  piperacillin-tazobactam, 3.375 g, Intravenous, Q8H  ProSource No Carb, 30 mL, Per G Tube, Daily  sodium chloride, 10 mL, Intravenous, Q12H  vancomycin, 750 mg, Intravenous, Q12H      Continuous Infusions:Pharmacy to dose vancomycin,       PRN Meds:.•  acetaminophen  •  acetaminophen  •  albuterol  •  dextromethorphan polistirex ER  •  HYDROcodone-acetaminophen  •  hydrOXYzine  •  melatonin  •  midodrine  •  Morphine **AND** naloxone  •  ondansetron  •  Pharmacy to dose vancomycin  •  [DISCONTINUED] potassium chloride **OR** potassium chloride **OR** potassium chloride  •  pseudoephedrine  •  saline  •  sodium chloride  •  sodium chloride  •  sodium chloride    Assessment & Plan   Assessment & Plan     Active Hospital Problems    Diagnosis  POA   • **Cellulitis and abscess of neck [L03.221, L02.11]  Yes   • Leukocytosis [D72.829]  Unknown   • Hypoxia [R09.02]  Unknown   • Moderate malnutrition (HCC) [E44.0]  Yes   • Abscess [L02.91]  Yes      Resolved Hospital Problems   No resolved problems to display.        Brief Hospital Course to date:  Lucian Bermudez is a 72 y.o. male with history of throat cancer and hypothyroid who presented with neck pain and abscess. Imaging with rim enhancing fluid collection in the right base of the neck concerning for abscess with no evidence of deep soft tissue fluid collection; R IJ vein compressed vs thrombosed. He was started on vanc and zosyn and admitted to medicine.     Neck abscess   Possible R IJ thrombosis   - Imaging per above  - BCx NGTD; MRSA negative; wound culture with corynebacterium   - ENT and ID consulted   --ENT performed aspiration of abscess with debridement  --abx per ID  -  Continue vanc/zosyn -- management per ID   --per imaging with radiology -- uncertain if carotid and IJ will be able to be visualized with overlying abscess and extensive radiation damage with US. Duplex obtained and does show flow in the R IJ. Shows compression of the vessel but likely related to extensive radiation damage. Initially on heparin gtt and now discontinued.  --encouraged incentive spirometry use       Bilateral upper lobe opacities   - CT with infectious vs inflammatory process  - Likely related to aspiration  - Continue vanc/zosyn . Per ID can likely be switched to dapto and ertapenem for outpatient therapy      RUE superficial thrombosis  -warm compress, symptomatic management    Leukocytosis   - Elevated following aspiration event. Improving and now resolved    Hypoxia   Aspiration event   - Following laying flat shortly after his bolus tube feeds x 2  - Currently down to 5L. Wean O2 as tolerated. Mobilize.   - IS    Hypothyroid   - Synthroid     History of throat cancer   - Nutrition consult. Has PEG     Expected Discharge Location and Transportation: home  Expected Discharge   Expected Discharge Date and Time     Expected Discharge Date Expected Discharge Time    Dec 23, 2022            DVT prophylaxis:  Medical DVT prophylaxis orders are present.          CODE STATUS:   Code Status and Medical Interventions:   Ordered at: 12/16/22 6990     Level Of Support Discussed With:    Patient     Code Status (Patient has no pulse and is not breathing):    CPR (Attempt to Resuscitate)     Medical Interventions (Patient has pulse or is breathing):    Full Support       Helen Meyers MD  12/20/22

## 2022-12-20 NOTE — PROGRESS NOTES
INFECTIOUS DISEASE Progress note    Lucian Bermudez  1950  [id number]      Admission Date: 12/16/2022      Requesting Provider: No ref. provider found  Evaluating Physician: Ricardo Tabor MD    Reason for Consultation: neck abscess, right    History of present illness:    Patient is a 72 y.o. male, with PMH throat cancer, s/p chemo/XRT/radical neck 2021, evaluated 12/17/2022 for a neck abscess on the right.  He developed the right neck lesion several months ago, which has been spontaneously draining off and on. He was seen by his PCP for a wellness check yesterday, and complained of neck soreness, tenderness, and was referred to the ED. They attempted to drain the lesion obtaining purulent drainage. He has been afebrile. Admitting labs with ESR >>130, CRP 12.45, WBC 8.98, lactate 0.9, Scr 0.77, MRSA PCR negative. CT neck with rim enhancing fluid collection in the right base of neck soft tissues concerning for abscess ( 6x3.7x2.4)  with soft tissue stranding and inflammatory changes extending all along the right neck.  No evidence of deep soft tissue fluid collection. Frothy material seen within the hypopharynx which places the patient at risk of aspiration , diffuse scattered groundglass opacities throughout the biapical lungs. Gram stain of wound with no WBCs, no organisms, culture pending. Currently on Vancomycin and Zosyn and we were consulted for evaluation and treatment on 12/17/2022. He did have a low grade fever Monday with chills. No increased sputum production, nausea, vomiting. Complains of neck soreness, extending down shoulder and arm.   Dr. Langford, ENT, has obtained another culture, and is going to attempt to open the lesion.  He has no other localizing signs or symptoms of infection.  He denies ill contacts, TB or HIV.    12/18/22: History reviewed.  Feels better.  Continues on vancomycin and piperacillin tazobactam awaiting cultures.  Duration to be determined.  Tmax of 100.4.  Wound  culture pending. Blood cultures negative to date. Wound still draining seropurulent drainage. He denies n/v/d.  He thinks his neck is less sore today.  Has been hypotensive, got albumin last pm.     12/19/2022 history reviewed.  Tolerating his piperacillin tazobactam and vancomycin.  Slow improvement with neck pain.  No high fever.  Cultures from his neck growing corynebacterium.  MRSA negative.  Blood cultures negative to date.    12/20/2022 history reviewed.  Arrangements being made for outpatient IV antibiotics for his neck abscess with corynebacterium and other.  Difficult situation with previous aggressive surgery for head and neck cancer, chemotherapy, and radiation therapy with damaged tissue.  No high fever.  Feeling better.  Antibiotics to continue with daptomycin and ertapenem until 1/17/2023 and reassess.    Past Medical History:   Diagnosis Date   • Cancer (HCC)    • H/O colonoscopy    • Hypothyroidism        Past Surgical History:   Procedure Laterality Date   • CATARACT EXTRACTION     • COLONOSCOPY     • LYMPH NODE DISSECTION     • PEG TUBE INSERTION  2000   • PEG TUBE REPLACEMENT  2016       Family History   Problem Relation Age of Onset   • Cancer Mother    • Lung cancer Father    • Cancer Father    • Melanoma Sister    • No Known Problems Son    • No Known Problems Maternal Grandmother    • No Known Problems Maternal Grandfather    • No Known Problems Paternal Grandmother    • No Known Problems Paternal Grandfather        Social History     Socioeconomic History   • Marital status:    Tobacco Use   • Smoking status: Never   • Smokeless tobacco: Never   Vaping Use   • Vaping Use: Never used   Substance and Sexual Activity   • Alcohol use: Not Currently     Comment: occasional   • Drug use: Never   • Sexual activity: Yes     Partners: Female       No Known Allergies      Medication:    Current Facility-Administered Medications:   •  acetaminophen (TYLENOL) 160 MG/5ML solution 650 mg, 650 mg,  Per G Tube, Q4H PRN, Keyla Yuen DO, 650 mg at 12/18/22 1508  •  acetaminophen (TYLENOL) tablet 650 mg, 650 mg, Per G Tube, Q4H PRN, Keyla Yuen DO  •  albuterol (PROVENTIL) nebulizer solution 0.083% 2.5 mg/3mL, 2.5 mg, Nebulization, Q4H PRN, Jalil Smith III, DO, 2.5 mg at 12/19/22 0351  •  dextromethorphan polistirex ER (DELSYM) 30 MG/5ML oral suspension 60 mg, 60 mg, Per PEG Tube, Q12H PRN, Rich Gomez, PharmD, 60 mg at 12/19/22 1146  •  Enoxaparin Sodium (LOVENOX) syringe 40 mg, 40 mg, Subcutaneous, Daily, Keyla Yuen DO, 40 mg at 12/20/22 1009  •  HYDROcodone-acetaminophen (NORCO) 5-325 MG per tablet 1 tablet, 1 tablet, Per G Tube, Q4H PRN, Keyla Yuen DO  •  hydrOXYzine (ATARAX) tablet 25 mg, 25 mg, Per PEG Tube, Nightly PRN, Rich Gomez, PharmD, 25 mg at 12/19/22 2129  •  levothyroxine (SYNTHROID, LEVOTHROID) tablet 75 mcg, 75 mcg, Per G Tube, Daily, Keyla Yuen DO, 75 mcg at 12/20/22 1008  •  melatonin tablet 5 mg, 5 mg, Per PEG Tube, Nightly PRN, Rich Gomez, PharmD, 5 mg at 12/19/22 2129  •  midodrine (PROAMATINE) tablet 2.5 mg, 2.5 mg, Per PEG Tube, TID PRN, Keyla Yuen DO  •  morphine injection 1 mg, 1 mg, Intravenous, Q4H PRN **AND** naloxone (NARCAN) injection 0.4 mg, 0.4 mg, Intravenous, Q5 Min PRN, Jalil Smith III, DO  •  Nutrisource fiber pack 1 packet, 1 packet, Per G Tube, Daily, Radha Vidal, MS,RD,LD, 1 packet at 12/20/22 1010  •  ondansetron (ZOFRAN) injection 4 mg, 4 mg, Intravenous, Q6H PRN, Jalil Smith III, DO  •  Pharmacy to dose vancomycin, , Does not apply, Continuous PRN, Roman Morin, DO  •  piperacillin-tazobactam (ZOSYN) 3.375 g in iso-osmotic dextrose 50 ml (premix), 3.375 g, Intravenous, Q8H, Roman Morin, DO, 3.375 g at 12/20/22 0335  •  [DISCONTINUED] potassium chloride (MICRO-K) CR capsule 40 mEq, 40 mEq, Oral, PRN **OR** potassium chloride (KLOR-CON) packet 40 mEq, 40 mEq, Per PEG Tube, PRN, 40  mEq at 12/20/22 1008 **OR** potassium chloride 10 mEq in 100 mL IVPB, 10 mEq, Intravenous, Q1H PRN, Rich Gomez, PharmD  •  ProSource No Carb oral solution 30 mL, 30 mL, Per G Tube, Daily, Radha Vidal, MS,RD,LD, 30 mL at 12/20/22 1010  •  pseudoephedrine (SUDAFED) tablet 30 mg, 30 mg, Per PEG Tube, Q8H PRN, Rich Gomez, PharmD, 30 mg at 12/19/22 1556  •  saline (AYR) nasal gel 1 application, 1 application, Topical, Q1H PRN, Keyla Yuen, DO, 1 application at 12/19/22 1042  •  sodium chloride 0.9 % flush 10 mL, 10 mL, Intravenous, Q12H, Jalil Smith III, DO, 10 mL at 12/20/22 1009  •  sodium chloride 0.9 % flush 10 mL, 10 mL, Intravenous, PRN, Jalil Smith III, DO  •  sodium chloride 0.9 % infusion 40 mL, 40 mL, Intravenous, PRN, Jalil Smith III, DO  •  sodium chloride nasal spray 2 spray, 2 spray, Each Nare, PRN, Keyla Yuen, DO, 2 spray at 12/19/22 1041  •  vancomycin in dextrose 5% 150 mL (VANCOCIN) IVPB 750 mg, 750 mg, Intravenous, Q12H, Roman Morin, DO, 750 mg at 12/20/22 0029    Review of Systems:  Constitutional--no fever, chills no sweats.  Appetite fair, and no malaise. No fatigue.  HEENT-- No new vision, hearing or throat complaints.  No epistaxis or oral sores.  Denies odynophagia or dysphagia. No headache, photophobia or neck stiffness.  Neck drainage as per HPI.    CV-- No chest pain, palpitation or syncope  Resp-- No SOB/cough/Hemoptysis, no wheezes  GI- No nausea, vomiting, or diarrhea.  No hematochezia, melena, or hematemesis. Denies jaundice or chronic liver disease.  -- No dysuria, hematuria, or flank pain.  Denies hesitancy, urgency or flank pain.  Lymph- no swollen lymph nodes in neck/axilla or groin.   Heme- No active bruising or bleeding; no Hx of DVT or PE.  MS-- + swelling or pain right neck, shoulder, arm.   No new back pain.  Neuro-- No acute focal weakness or numbness in the arms or legs.  No seizures.  Skin--No rashes.  Right neck  lesion with less purulent drainage.  No crepitus or bullae.    Physical Exam:   Vital Signs  Temp (24hrs), Av.2 °F (36.2 °C), Min:95.8 °F (35.4 °C), Max:98 °F (36.7 °C)    Temp  Min: 95.8 °F (35.4 °C)  Max: 98 °F (36.7 °C)  BP  Min: 106/80  Max: 130/99  Pulse  Min: 37  Max: 82  Resp  Min: 14  Max: 18  SpO2  Min: 96 %  Max: 99 %    GENERAL: Awake and alert, in moderate distress.  Cachectic resting in chair.  Appears older than stated age.  HEENT: Normocephalic, atraumatic.   EOMI. No conjunctival injection. No icterus. Oropharynx clear without evidence of thrush or exudate. No evidence of peridontal disease.    NECK: Supple, right neck with decreased erythema and warmth along with radiation injury, no crepitus or bullae  HEART: RRR; No murmur, rubs, gallops.  No JVD.  LUNGS: Basilar rales, rhonchi. Normal respiratory effort. Nonlabored.  ABDOMEN: Soft, nontender, nondistended. Positive bowel sounds. No rebound or guarding.   EXT:  No cyanosis, clubbing or edema. No cord.  :  Without Venegas catheter.  MSK: No joint effusions or erythema  SKIN: Warm and dry.  Right neck lesion approximately 1cm in diameter, with copious seropurulent drainage, induration, tenderness  NEURO: Oriented to PPT.  Nonfocal  PSYCHIATRIC: Normal insight and judgment. Cooperative with PE    Laboratory Data    Results from last 7 days   Lab Units 22  0353 22  0358 22  0149   WBC 10*3/mm3 7.57 15.86* 18.87*   HEMOGLOBIN g/dL 10.9* 11.6* 11.3*   HEMATOCRIT % 35.0* 36.4* 35.1*   PLATELETS 10*3/mm3 190 218 194     Results from last 7 days   Lab Units 22  0353   SODIUM mmol/L 138   POTASSIUM mmol/L 3.6   CHLORIDE mmol/L 100   CO2 mmol/L 31.0*   BUN mg/dL 18   CREATININE mg/dL 0.61*   GLUCOSE mg/dL 82   CALCIUM mg/dL 8.6     Results from last 7 days   Lab Units 22  0353   ALK PHOS U/L 76   BILIRUBIN mg/dL 0.7   ALT (SGPT) U/L 18   AST (SGOT) U/L 26     Results from last 7 days   Lab Units 22  1931   SED RATE  mm/hr >130*     Results from last 7 days   Lab Units 12/16/22  1931   CRP mg/dL 12.45*     Results from last 7 days   Lab Units 12/20/22  0353   LACTATE mmol/L 0.6     Results from last 7 days   Lab Units 12/20/22  0353 12/18/22  0149   CK TOTAL U/L 71 124     Results from last 7 days   Lab Units 12/18/22  1122 12/18/22  0149   VANCOMYCIN RM mcg/mL 10.00 28.90     Estimated Creatinine Clearance: 118.6 mL/min (A) (by C-G formula based on SCr of 0.61 mg/dL (L)).      Microbiology:  No results found for: ACANTHNAEG, AFBCX, BPERTUSSISCX, BLOODCX  No results found for: BCIDPCR, CXREFLEX, CSFCX, CULTURETIS  No results found for: CULTURES, HSVCX, URCX  No results found for: EYECULTURE, GCCX, HSVCULTURE, LABHSV  No results found for: LEGIONELLA, MRSACX, MUMPSCX, MYCOPLASCX  No results found for: NOCARDIACX, STOOLCX  No results found for: THROATCX, UNSTIMCULT, URINECX, CULTURE, VZVCULTUR  No results found for: VIRALCULTU, WOUNDCX    Radiology:  Imaging Results (Last 72 Hours)     Procedure Component Value Units Date/Time    XR Chest 1 View [[id number]] Collected: 12/17/22 1348     Updated: 12/17/22 1352    Narrative:      DATE OF EXAM:  12/17/2022 1:28 PM     PROCEDURE:  XR CHEST 1 VW-     INDICATIONS:  aspiration, hypoxia; L03.221-Cellulitis of neck; L02.11-Cutaneous  abscess of neck     COMPARISON:  Chest x-ray 11/10/2022     TECHNIQUE:   Single radiographic view of the chest was obtained.     FINDINGS:  Lungs normal expanded. Cardiomegaly. There is improved aeration of the  right lower lobe compared to previous study. There some linear opacity  in the right midlung right lower lobe likely due to scar. No new  parenchymal opacities. Pulmonary vessels are distinct. No pneumothorax  or pleural effusion.       Impression:      Improved appearance of chest. The airspace opacity right lower lobe has  resolved. There is linear opacity at the lung bases likely due to scar.     This report was finalized on 12/17/2022 1:49 PM by  Shayy Manuel MD.           Impression:   - Right neck abscess and cellulitis, initial gram stain with no organisms, no cells, culture pending . MRSA PCR negative, growing corynebacterium.  - Throat cancer, s/p XRT, chemo/radical neck 2021, related to above issue.  - Hypopharynx with frothy materia, and scattered groundglass opacities biapically - increased risk of aspiration.  - Elevated inflammatory markers, secondary to above.  ESR greater than 130, C-reactive protein 12.45 on 12/16.  Blood cultures x2 negative on 12/162022.  -Hypoalbuminemia 2.90.  Mild to moderate malnutrition.  -Anemia, chronic disease.  Slightly worse.  -Acute hypoxic respiratory failure, likely related to above issues and possible aspiration pneumonia.  6 L/min to 40 L/min nasal cannula, 12/20.  -Leukocytosis, neutrophilic, resolved.  Related to above issues.  -Hypokalemia resolved.    PLAN/RECOMMENDATIONS:     1.  Diagnostically, monitor blood cultures, physical examination, radiology imaging, CBC, CMP, ESR, CRP, lactic acid, and procalcitonin, culture which have been obtained.  2.  Therapeutically, change vancomycin and piperacillin tazobactam (12/17-) on 12/20 anticipating outpatient therapy to daptomycin and ertapenem for outpatient therapy.  3.  Continue supportive care, oxygen support therapy at 40 L/min on 12/17/2022.  6 L/min on 12/19, 12/20.  4.  ENT surgery Dr. Liban Langford following patient.    I discussed the patients findings and my recommendations with patient, and the patient's wife.  Discussed outpt treatment.    Case management orders: Please arrange for home antibiotics, oral antibiotics at [facility] with daptomycin 500 mg IV daily, ertapenem 1 g IV daily until 1/17/2023 for neck abscess.  Check CBC, CMP, CRP weekly while on IV antibiotics.  Fax orders to [phone], call [phone] with final arrangements.  Arrange for follow-up with me in 2 weeks post discharge.    Our group would be pleased  to follow this patient over the course of their hospitalization and assist with outpatient antimicrobial therapy, as indicated.  Further recommendations depend on the results of the cultures and clinical course.  Side effects of medications discussed.  Increased risk for adverse drug reactions, line complications, need for surgery, readmission.    Ricardo Tabor MD  12/20/2022  11:37 EST

## 2022-12-20 NOTE — PLAN OF CARE
Problem: Adult Inpatient Plan of Care  Goal: Plan of Care Review  Outcome: Ongoing, Progressing  Goal: Patient-Specific Goal (Individualized)  Outcome: Ongoing, Progressing  Goal: Absence of Hospital-Acquired Illness or Injury  Outcome: Ongoing, Progressing  Intervention: Identify and Manage Fall Risk  Recent Flowsheet Documentation  Taken 12/20/2022 0400 by Angy Layne RN  Safety Promotion/Fall Prevention:   clutter free environment maintained   safety round/check completed  Taken 12/20/2022 0200 by Angy Layne RN  Safety Promotion/Fall Prevention:   clutter free environment maintained   safety round/check completed  Taken 12/20/2022 0000 by Angy Layne RN  Safety Promotion/Fall Prevention:   clutter free environment maintained   safety round/check completed  Taken 12/19/2022 2000 by Angy Layne RN  Safety Promotion/Fall Prevention:   clutter free environment maintained   safety round/check completed  Intervention: Prevent Skin Injury  Recent Flowsheet Documentation  Taken 12/20/2022 0400 by Angy Layne RN  Body Position: position changed independently  Taken 12/20/2022 0200 by Angy Layne RN  Body Position: position changed independently  Taken 12/20/2022 0000 by Angy Layen RN  Body Position: position changed independently  Taken 12/19/2022 2000 by Angy Layne RN  Body Position: position changed independently  Intervention: Prevent and Manage VTE (Venous Thromboembolism) Risk  Recent Flowsheet Documentation  Taken 12/19/2022 2000 by Angy Layne RN  Range of Motion: active ROM (range of motion) encouraged  Intervention: Prevent Infection  Recent Flowsheet Documentation  Taken 12/20/2022 0400 by Angy Layne RN  Infection Prevention:   environmental surveillance performed   hand hygiene promoted   rest/sleep promoted  Taken 12/20/2022 0200 by Angy Layne RN  Infection Prevention:   environmental surveillance performed   hand hygiene promoted   rest/sleep  promoted  Taken 12/20/2022 0000 by Angy Layne RN  Infection Prevention:   environmental surveillance performed   hand hygiene promoted   rest/sleep promoted  Taken 12/19/2022 2000 by Angy Layne RN  Infection Prevention:   environmental surveillance performed   hand hygiene promoted   rest/sleep promoted  Goal: Optimal Comfort and Wellbeing  Outcome: Ongoing, Progressing  Intervention: Provide Person-Centered Care  Recent Flowsheet Documentation  Taken 12/20/2022 0000 by Angy Layne RN  Trust Relationship/Rapport:   care explained   choices provided   questions answered   questions encouraged   thoughts/feelings acknowledged  Taken 12/19/2022 2000 by Angy Layne RN  Trust Relationship/Rapport:   care explained   choices provided   questions answered   questions encouraged   thoughts/feelings acknowledged  Goal: Readiness for Transition of Care  Outcome: Ongoing, Progressing     Problem: Hypertension Comorbidity  Goal: Blood Pressure in Desired Range  Outcome: Ongoing, Progressing  Intervention: Maintain Blood Pressure Management  Recent Flowsheet Documentation  Taken 12/19/2022 2000 by Angy Layne RN  Medication Review/Management: medications reviewed   Goal Outcome Evaluation:

## 2022-12-20 NOTE — PROGRESS NOTES
[facility]     Progress Note    Patient Name: Lucian Bermudez  : 1950  MRN: [id number]  Primary Care Physician:  Wei Simmons MD  Date of admission: 2022    Subjective   Subjective       HPI:  Feels improved. No dyspnea. Improved neck rom. No fever        Objective   Objective     Vitals:   Temp:  [95.6 °F (35.3 °C)-98.8 °F (37.1 °C)] 98 °F (36.7 °C)  Heart Rate:  [63-93] 80  Resp:  [18-24] 18  BP: ()/(50-81) 106/80  Flow (L/min):  [6] 6  Physical Exam    Constitutional: Awake, alert   Eyes: PERRLA, sclerae anicteric, no conjunctival injection   HENT: NCAT, mucous membranes moist   Neck: woody induration. Granulation right neck with no purulence     Neurologic: Oriented x 3,  Cranial Nerves grossly intact to confrontation   Skin: No rashes     Result Review    Result Review:  I have personally reviewed the results from the time of this admission to 2022 20:11 EST and agree with these findings:  Wbc trending down      Assessment & Plan   Assessment / Plan     Brief Patient Summary:  Lucian Bermudez is a 72 y.o. male who has hx of bot ca in early . Recent neck abscess aspirated and debrided with needle and hemostat. Symptomatically improving. Defer abx to ID. Call back if condition worsens.    Active Hospital Problems:  Active Hospital Problems    Diagnosis    • **Cellulitis and abscess of neck    • Leukocytosis    • Hypoxia    • Moderate malnutrition (HCC)    • Abscess          DVT prophylaxis:  Medical DVT prophylaxis orders are present.    CODE STATUS:   Level Of Support Discussed With: Patient  Code Status (Patient has no pulse and is not breathing): CPR (Attempt to Resuscitate)  Medical Interventions (Patient has pulse or is breathing): Full Support      Liban aLngford MD

## 2022-12-21 ENCOUNTER — READMISSION MANAGEMENT (OUTPATIENT)
Dept: CALL CENTER | Facility: HOSPITAL | Age: 72
End: 2022-12-21

## 2022-12-21 VITALS
HEART RATE: 91 BPM | BODY MASS INDEX: 22.96 KG/M2 | RESPIRATION RATE: 20 BRPM | OXYGEN SATURATION: 77 % | SYSTOLIC BLOOD PRESSURE: 141 MMHG | TEMPERATURE: 98.1 F | HEIGHT: 71 IN | DIASTOLIC BLOOD PRESSURE: 103 MMHG | WEIGHT: 164 LBS

## 2022-12-21 LAB
BACTERIA SPEC AEROBE CULT: NORMAL
BACTERIA SPEC AEROBE CULT: NORMAL

## 2022-12-21 PROCEDURE — 05HY33Z INSERTION OF INFUSION DEVICE INTO UPPER VEIN, PERCUTANEOUS APPROACH: ICD-10-PCS | Performed by: STUDENT IN AN ORGANIZED HEALTH CARE EDUCATION/TRAINING PROGRAM

## 2022-12-21 PROCEDURE — 25010000002 ERTAPENEM PER 500 MG: Performed by: INTERNAL MEDICINE

## 2022-12-21 PROCEDURE — 99239 HOSP IP/OBS DSCHRG MGMT >30: CPT | Performed by: STUDENT IN AN ORGANIZED HEALTH CARE EDUCATION/TRAINING PROGRAM

## 2022-12-21 PROCEDURE — 25010000002 ENOXAPARIN PER 10 MG: Performed by: INTERNAL MEDICINE

## 2022-12-21 PROCEDURE — C1751 CATH, INF, PER/CENT/MIDLINE: HCPCS

## 2022-12-21 PROCEDURE — C1894 INTRO/SHEATH, NON-LASER: HCPCS

## 2022-12-21 PROCEDURE — 25010000002 DAPTOMYCIN PER 1 MG: Performed by: INTERNAL MEDICINE

## 2022-12-21 RX ORDER — SODIUM CHLORIDE 0.9 % (FLUSH) 0.9 %
10 SYRINGE (ML) INJECTION AS NEEDED
Status: DISCONTINUED | OUTPATIENT
Start: 2022-12-21 | End: 2022-12-21 | Stop reason: HOSPADM

## 2022-12-21 RX ORDER — ECHINACEA PURPUREA EXTRACT 125 MG
2 TABLET ORAL AS NEEDED
Qty: 15 ML | Refills: 12 | Status: SHIPPED | OUTPATIENT
Start: 2022-12-21

## 2022-12-21 RX ORDER — SODIUM CHLORIDE 0.9 % (FLUSH) 0.9 %
10 SYRINGE (ML) INJECTION EVERY 12 HOURS SCHEDULED
Status: DISCONTINUED | OUTPATIENT
Start: 2022-12-21 | End: 2022-12-21 | Stop reason: HOSPADM

## 2022-12-21 RX ORDER — PSEUDOEPHEDRINE HCL 30 MG
30 TABLET ORAL EVERY 8 HOURS PRN
Qty: 30 TABLET | Refills: 0 | Status: SHIPPED | OUTPATIENT
Start: 2022-12-21

## 2022-12-21 RX ORDER — DEXTROMETHORPHAN POLISTIREX 30 MG/5ML
60 SUSPENSION ORAL EVERY 12 HOURS PRN
Qty: 280 ML | Refills: 0 | Status: SHIPPED | OUTPATIENT
Start: 2022-12-21

## 2022-12-21 RX ORDER — SODIUM CHLORIDE 0.9 % (FLUSH) 0.9 %
20 SYRINGE (ML) INJECTION AS NEEDED
Status: DISCONTINUED | OUTPATIENT
Start: 2022-12-21 | End: 2022-12-21 | Stop reason: HOSPADM

## 2022-12-21 RX ADMIN — LEVOTHYROXINE SODIUM 75 MCG: 0.07 TABLET ORAL at 09:52

## 2022-12-21 RX ADMIN — Medication 10 ML: at 09:52

## 2022-12-21 RX ADMIN — ERTAPENEM 1 G: 1 INJECTION INTRAMUSCULAR; INTRAVENOUS at 13:16

## 2022-12-21 RX ADMIN — DEXTROMETHORPHAN POLISTIREX 60 MG: 30 SUSPENSION ORAL at 02:41

## 2022-12-21 RX ADMIN — DAPTOMYCIN 450 MG: 500 INJECTION, POWDER, LYOPHILIZED, FOR SOLUTION INTRAVENOUS at 13:17

## 2022-12-21 RX ADMIN — ENOXAPARIN SODIUM 40 MG: 40 INJECTION SUBCUTANEOUS at 09:52

## 2022-12-21 RX ADMIN — Medication 1 PACKET: at 09:54

## 2022-12-21 NOTE — CASE MANAGEMENT/SOCIAL WORK
Discharge Planning Assessment  [facility]     Patient Name: Lucian Bermudez  MRN: [id number]  Today's Date: 12/21/2022    Admit Date: 12/16/2022    Plan: Home   Discharge Needs Assessment    No documentation.                Discharge Plan     Row Name 12/21/22 1408       Plan    Plan Home    Patient/Family in Agreement with Plan yes    Plan Comments Met & spoke with Mr Bermudez and his spouse at the bedside. He was up in the chair. Reports feeling much better and is ready to go home. DC order is in place. Will need a PICC placed prior to DC for IV abx therapy through 1/17/2023. Orginally, the plan was to do abx therapy at home. Called [facility] and the price was $875 for the first week and $700 thereafter. Called and spoke with Mrs Bermudez and Mr Bermudez doesn't want to spend the money. Instead, he'd rather go to [facility] office. Called [facility] and the meds are covered 100%. They will also change the PICC dressing and obtain labs. HH isn't needed. [facility] abx infusion appt made for 12/22 @ 10:15. Follow-up appt with Dr Tabor made for 1/3/2023 @ 10am. All appt dates/times are in Epic. No other needs/resources voiced. Plan is home today and his spouse will transport.    Final Discharge Disposition Code 01 - home or self-care              Continued Care and Services - Admitted Since 12/16/2022     Dialysis/Infusion Coordination complete.    Service Provider Request Status Selected Services Address Phone Fax Patient Preferred    [facility]  Selected Infusion and IV Therapy [id number], [facility] [address] [phone] --          Home Medical Care     Service Provider Request Status Selected Services Address Phone Fax Patient Preferred    [facility] Declined  Inadequate staffing N/A [address] [facility] [address] [phone] --       Internal Comment last updated by Lacey Patrick, RN 12/21/2022 1258    12/21 referral called to Gianna                          Expected Discharge Date and Time     Expected Discharge Date Expected Discharge Time    Dec 21, 2022          Demographic Summary    No documentation.                Functional Status    No documentation.                Psychosocial    No documentation.                Abuse/Neglect    No documentation.                Legal    No documentation.                Substance Abuse    No documentation.                Patient Forms    No documentation.                   Lacey Patrick RN

## 2022-12-21 NOTE — PROGRESS NOTES
Nutrition Services    Patient Name:  Lucian Bermudez  YOB: 1950  MRN: [id number]  Admit Date:  12/16/2022      EMR reviewed.    RD notes plan for patient discharge today after PICC placement.    Patient reports he has experienced coughing fit today. Reports he has not reached goal EN regimen of 4 cartons Boost VHC this admission; however, he and his wife report that they are hopeful that the coughing will improve when he returns home and gets back into his routine and be able to tolerate 4 cartons daily. RD encouraged patient and wife to contact MD if he is unable to tolerate 4 cartons daily after a couple days of being home, as patient needs the nutrition to prevent weight loss.        Electronically signed by:  Snehal Mitchell RD  12/21/22 13:50 EST

## 2022-12-21 NOTE — PROGRESS NOTES
INFECTIOUS DISEASE Progress note    Lucian Bermudez  1950  [id number]      Admission Date: 12/16/2022      Requesting Provider: No ref. provider found  Evaluating Physician: Ricardo Tabor MD    Reason for Consultation: neck abscess, right    History of present illness:    Patient is a 72 y.o. male, with PMH throat cancer, s/p chemo/XRT/radical neck 2021, evaluated 12/17/2022 for a neck abscess on the right.  He developed the right neck lesion several months ago, which has been spontaneously draining off and on. He was seen by his PCP for a wellness check yesterday, and complained of neck soreness, tenderness, and was referred to the ED. They attempted to drain the lesion obtaining purulent drainage. He has been afebrile. Admitting labs with ESR >>130, CRP 12.45, WBC 8.98, lactate 0.9, Scr 0.77, MRSA PCR negative. CT neck with rim enhancing fluid collection in the right base of neck soft tissues concerning for abscess ( 6x3.7x2.4)  with soft tissue stranding and inflammatory changes extending all along the right neck.  No evidence of deep soft tissue fluid collection. Frothy material seen within the hypopharynx which places the patient at risk of aspiration , diffuse scattered groundglass opacities throughout the biapical lungs. Gram stain of wound with no WBCs, no organisms, culture pending. Currently on Vancomycin and Zosyn and we were consulted for evaluation and treatment on 12/17/2022. He did have a low grade fever Monday with chills. No increased sputum production, nausea, vomiting. Complains of neck soreness, extending down shoulder and arm.   Dr. Langford, ENT, has obtained another culture, and is going to attempt to open the lesion.  He has no other localizing signs or symptoms of infection.  He denies ill contacts, TB or HIV.    12/18/22: History reviewed.  Feels better.  Continues on vancomycin and piperacillin tazobactam awaiting cultures.  Duration to be determined.  Tmax of 100.4.  Wound  culture pending. Blood cultures negative to date. Wound still draining seropurulent drainage. He denies n/v/d.  He thinks his neck is less sore today.  Has been hypotensive, got albumin last pm.     12/19/2022 history reviewed.  Tolerating his piperacillin tazobactam and vancomycin.  Slow improvement with neck pain.  No high fever.  Cultures from his neck growing corynebacterium.  MRSA negative.  Blood cultures negative to date.    12/20/2022 history reviewed.  Arrangements being made for outpatient IV antibiotics for his neck abscess with corynebacterium and other.  Difficult situation with previous aggressive surgery for head and neck cancer, chemotherapy, and radiation therapy with damaged tissue.  No high fever.  Feeling better.  Antibiotics to continue with daptomycin and ertapenem until 1/17/2023 and reassess.    12/21/2022 history reviewed.  Being prepared to go home to get his IV antibiotics for his neck abscess related to his head neck cancer with daptomycin and ertapenem to continue until 1/17/2023 and reassess.  He feels better overall.  No fever.  Still has drainage right neck.    Past Medical History:   Diagnosis Date    Cancer (HCC)     H/O colonoscopy     Hypothyroidism        Past Surgical History:   Procedure Laterality Date    CATARACT EXTRACTION      COLONOSCOPY      LYMPH NODE DISSECTION      PEG TUBE INSERTION  2000    PEG TUBE REPLACEMENT  2016       Family History   Problem Relation Age of Onset    Cancer Mother     Lung cancer Father     Cancer Father     Melanoma Sister     No Known Problems Son     No Known Problems Maternal Grandmother     No Known Problems Maternal Grandfather     No Known Problems Paternal Grandmother     No Known Problems Paternal Grandfather        Social History     Socioeconomic History    Marital status:    Tobacco Use    Smoking status: Never    Smokeless tobacco: Never   Vaping Use    Vaping Use: Never used   Substance and Sexual Activity    Alcohol use:  Not Currently     Comment: occasional    Drug use: Never    Sexual activity: Yes     Partners: Female       No Known Allergies      Medication:    Current Facility-Administered Medications:     acetaminophen (TYLENOL) 160 MG/5ML solution 650 mg, 650 mg, Per G Tube, Q4H PRN, Keyla Yuen DO, 650 mg at 12/18/22 1508    acetaminophen (TYLENOL) tablet 650 mg, 650 mg, Per G Tube, Q4H PRN, Keyla Yuen DO    albuterol (PROVENTIL) nebulizer solution 0.083% 2.5 mg/3mL, 2.5 mg, Nebulization, Q4H PRN, Jalil Smith III, DO, 2.5 mg at 12/19/22 0351    DAPTOmycin (CUBICIN) 450 mg in sodium chloride 0.9 % 50 mL IVPB, 6 mg/kg, Intravenous, Q24H, Ricardo Tabor MD, Last Rate: 100 mL/hr at 12/20/22 1351, 450 mg at 12/20/22 1351    dextromethorphan polistirex ER (DELSYM) 30 MG/5ML oral suspension 60 mg, 60 mg, Per PEG Tube, Q12H PRN, Rich Gomez, PharmD, 60 mg at 12/21/22 0241    Enoxaparin Sodium (LOVENOX) syringe 40 mg, 40 mg, Subcutaneous, Daily, Keyla Yuen DO, 40 mg at 12/21/22 0952    ertapenem (INVanz) 1 g/100 mL 0.9% NS VTB (mbp), 1 g, Intravenous, Q24H, Ricardo Tabor MD, 1 g at 12/20/22 1317    HYDROcodone-acetaminophen (NORCO) 5-325 MG per tablet 1 tablet, 1 tablet, Per G Tube, Q4H PRN, Keyla Yuen DO    hydrOXYzine (ATARAX) tablet 25 mg, 25 mg, Per PEG Tube, Nightly PRN, Rich Gomez, PharmD, 25 mg at 12/20/22 2115    levothyroxine (SYNTHROID, LEVOTHROID) tablet 75 mcg, 75 mcg, Per G Tube, Daily, Keyla Yune DO, 75 mcg at 12/21/22 0952    melatonin tablet 5 mg, 5 mg, Per PEG Tube, Nightly PRN, Rich Gomez, PharmD, 5 mg at 12/20/22 2116    midodrine (PROAMATINE) tablet 2.5 mg, 2.5 mg, Per PEG Tube, TID PRN, Keyla Yuen,     morphine injection 1 mg, 1 mg, Intravenous, Q4H PRN **AND** naloxone (NARCAN) injection 0.4 mg, 0.4 mg, Intravenous, Q5 Min PRN, Jalil Smith III, DO    Nutrisource fiber pack 1 packet, 1 packet, Per G Tube, Daily, Radha Vidal,  MS,RD,LD, 1 packet at 12/21/22 0954    ondansetron (ZOFRAN) injection 4 mg, 4 mg, Intravenous, Q6H PRN, Jalil Smith III, DO    [DISCONTINUED] potassium chloride (MICRO-K) CR capsule 40 mEq, 40 mEq, Oral, PRN **OR** potassium chloride (KLOR-CON) packet 40 mEq, 40 mEq, Per PEG Tube, PRN, 40 mEq at 12/20/22 1317 **OR** potassium chloride 10 mEq in 100 mL IVPB, 10 mEq, Intravenous, Q1H PRN, Rich Gomez, PharmD    ProSource No Carb oral solution 30 mL, 30 mL, Per G Tube, Daily, Radha Vidal, MS,RD,LD, 30 mL at 12/20/22 1010    pseudoephedrine (SUDAFED) tablet 30 mg, 30 mg, Per PEG Tube, Q8H PRN, Rich Gomez, PharmD, 30 mg at 12/20/22 2116    saline (AYR) nasal gel 1 application, 1 application, Topical, Q1H PRN, Keyla Yuen, DO, 1 application at 12/19/22 1042    sodium chloride 0.9 % flush 10 mL, 10 mL, Intravenous, Q12H, Jalil Smith III, DO, 10 mL at 12/21/22 0952    sodium chloride 0.9 % flush 10 mL, 10 mL, Intravenous, PRN, Jalil Smith III, DO    sodium chloride 0.9 % infusion 40 mL, 40 mL, Intravenous, PRN, Jalil Smith III, DO    sodium chloride nasal spray 2 spray, 2 spray, Each Nare, PRN, AlpaConcepcioney, DO, 2 spray at 12/19/22 1041    Review of Systems:  Constitutional--no fever, chills no sweats.  Appetite fair, and no malaise. No fatigue.  HEENT-- No new vision, hearing or throat complaints.  No epistaxis or oral sores.  Denies odynophagia or dysphagia. No headache, photophobia or neck stiffness.  Neck drainage as per HPI.    CV-- No chest pain, palpitation or syncope  Resp-- No SOB/cough/Hemoptysis, no wheezes  GI- No nausea, vomiting, or diarrhea.  No hematochezia, melena, or hematemesis. Denies jaundice or chronic liver disease.  -- No dysuria, hematuria, or flank pain.  Denies hesitancy, urgency or flank pain.  Lymph- no swollen lymph nodes in neck/axilla or groin.   Heme- No active bruising or bleeding; no Hx of DVT or PE.  MS-- + swelling or  pain right neck, shoulder, arm.   No new back pain.  Neuro-- No acute focal weakness or numbness in the arms or legs.  No seizures.  Skin--No rashes.  Right neck lesion with some purulent drainage.  No crepitus or bullae.    Physical Exam:   Vital Signs  Temp (24hrs), Av.5 °F (36.4 °C), Min:96.1 °F (35.6 °C), Max:98.5 °F (36.9 °C)    Temp  Min: 96.1 °F (35.6 °C)  Max: 98.5 °F (36.9 °C)  BP  Min: 121/85  Max: 166/103  Pulse  Min: 74  Max: 91  Resp  Min: 18  Max: 22  SpO2  Min: 94 %  Max: 96 %    GENERAL: Awake and alert, in mild distress.  Cachectic resting in bed.  Appears older than stated age.  HEENT: Normocephalic, atraumatic.   EOMI. No conjunctival injection. No icterus. Oropharynx clear without evidence of thrush or exudate. No evidence of peridontal disease.    NECK: Supple, right neck with decreased erythema and warmth along with radiation injury, no crepitus or bullae  HEART: RRR; No murmur, rubs, gallops.    LUNGS: Basilar rales, rhonchi. Normal respiratory effort. Nonlabored.  ABDOMEN: Soft, nontender, nondistended. Positive bowel sounds. No rebound or guarding.   EXT:  No cyanosis, clubbing or edema. No cord.  :  Without Venegas catheter.  MSK: No joint effusions or erythema  SKIN: Warm and dry.  Right neck lesion approximately 1cm in diameter, with copious seropurulent drainage, less induration, tenderness, and some chronic radiation injury to tissue  NEURO: Oriented to PPT.  Nonfocal  PSYCHIATRIC: Normal insight and judgment. Cooperative with PE    Laboratory Data    Results from last 7 days   Lab Units 22  0353 12/19/22  0358 12/18/22  0149   WBC 10*3/mm3 7.57 15.86* 18.87*   HEMOGLOBIN g/dL 10.9* 11.6* 11.3*   HEMATOCRIT % 35.0* 36.4* 35.1*   PLATELETS 10*3/mm3 190 218 194     Results from last 7 days   Lab Units 22  0353   SODIUM mmol/L  --  138   POTASSIUM mmol/L 3.8 3.6   CHLORIDE mmol/L  --  100   CO2 mmol/L  --  31.0*   BUN mg/dL  --  18   CREATININE mg/dL  --   0.61*   GLUCOSE mg/dL  --  82   CALCIUM mg/dL  --  8.6     Results from last 7 days   Lab Units 12/20/22  0353   ALK PHOS U/L 76   BILIRUBIN mg/dL 0.7   ALT (SGPT) U/L 18   AST (SGOT) U/L 26     Results from last 7 days   Lab Units 12/16/22  1931   SED RATE mm/hr >130*     Results from last 7 days   Lab Units 12/16/22  1931   CRP mg/dL 12.45*     Results from last 7 days   Lab Units 12/20/22  0353   LACTATE mmol/L 0.6     Results from last 7 days   Lab Units 12/20/22  0353 12/18/22  0149   CK TOTAL U/L 71 124     Results from last 7 days   Lab Units 12/18/22  1122 12/18/22  0149   VANCOMYCIN RM mcg/mL 10.00 28.90     Estimated Creatinine Clearance: 118.6 mL/min (A) (by C-G formula based on SCr of 0.61 mg/dL (L)).      Microbiology:  No results found for: ACANTHNAEG, AFBCX, BPERTUSSISCX, BLOODCX  No results found for: BCIDPCR, CXREFLEX, CSFCX, CULTURETIS  No results found for: CULTURES, HSVCX, URCX  No results found for: EYECULTURE, GCCX, HSVCULTURE, LABHSV  No results found for: LEGIONELLA, MRSACX, MUMPSCX, MYCOPLASCX  No results found for: NOCARDIACX, STOOLCX  No results found for: THROATCX, UNSTIMCULT, URINECX, CULTURE, VZVCULTUR  No results found for: VIRALCULTU, WOUNDCX    Radiology:  Imaging Results (Last 72 Hours)       ** No results found for the last 72 hours. **          Impression:   - Right neck abscess and cellulitis, initial gram stain with no organisms, no cells, culture pending . MRSA PCR negative, growing corynebacterium.  - Throat cancer, s/p XRT, chemo/radical neck 2021, related to above issue.  - Hypopharynx with frothy materia, and scattered groundglass opacities biapically - increased risk of aspiration.  - Elevated inflammatory markers, secondary to above.  ESR greater than 130, C-reactive protein 12.45 on 12/16.  Blood cultures x2 negative on 12/162022.  -Hypoalbuminemia 2.90.  Mild to moderate malnutrition.  -Anemia, chronic disease.  Ongoing.  -Acute hypoxic respiratory failure, likely  related to above issues and possible aspiration pneumonia.  6 L/min to 40 L/min nasal cannula, 12/20.  3 L/min on 12/21.  -Leukocytosis, neutrophilic, resolved.  Related to above issues.  -Hypokalemia resolved.     Discharging soon.    PLAN/RECOMMENDATIONS:     1.  Diagnostically, monitor blood cultures, physical examination, radiology imaging, CBC, CMP, ESR, CRP, lactic acid, and procalcitonin, culture which have been obtained.  2.  Therapeutically, changed vancomycin and piperacillin tazobactam (12/17-) on 12/20 anticipating outpatient therapy to daptomycin and ertapenem for outpatient therapy.  3.  Continue supportive care, oxygen support therapy at 40 L/min on 12/17/2022.  6 L/min on 12/19, 12/20.  3 L/min on 12/21.  4.  ENT surgery Dr. Liban Langford following patient.    I discussed the patients findings and my recommendations with patient, and the patient's wife.  Discussed outpt treatment.    Case management orders: Please arrange for home antibiotics, [facility] or office with daptomycin 500 mg IV daily, ertapenem 1 g IV daily until 1/17/2023 for neck abscess.  Check CBC, CMP, CRP weekly while on IV antibiotics.  Fax orders to [phone], call [phone] with final arrangements.  Arrange for follow-up with nursing in 1 week and me in 2 weeks post discharge.    Our group would be pleased to follow this patient over the course of their hospitalization and assist with outpatient antimicrobial therapy, as indicated.  Further recommendations depend on the results of the cultures and clinical course.  Side effects of medications discussed.  Increased risk for adverse drug reactions, line complications, need for surgery, readmission.  I discussed the plans in detail for outpatient antibiotics with the patient's wife on 12/21.  And case management, and hospitalist service.    Ricardo Tabor MD  12/21/2022  11:03 EST

## 2022-12-21 NOTE — CASE MANAGEMENT/SOCIAL WORK
Continued Stay Note  [facility]     Patient Name: Lucian Bermudez  MRN: [id number]  Today's Date: 12/21/2022    Admit Date: 12/16/2022    Plan: Home   Discharge Plan     Row Name 12/21/22 1651       Plan    Plan Home    Patient/Family in Agreement with Plan yes    Plan Comments SHAHID recd call from staff RN regarding need for home oxygen.  I have placed order for oxygen in [facility] and have confirmed with Jak at [facility] that he will deliver portable tank to the bedside today.    Final Discharge Disposition Code 01 - home or self-care    Row Name 12/21/22 1408       Plan    Plan Home    Patient/Family in Agreement with Plan yes    Plan Comments Met & spoke with Mr Bermudez and his spouse at the bedside. He was up in the chair. Reports feeling much better and is ready to go home. DC order is in place. Will need a PICC placed prior to DC for IV abx therapy through 1/17/2023. Orginally, the plan was to do abx therapy at home. Called [facility] and the price was $875 for the first week and $700 thereafter. Called and spoke with Mrs Bermudez and Mr Bermudez doesn't want to spend the money. Instead, he'd rather go to [facility] office. Called [facility] and the meds are covered 100%. They will also change the PICC dressing and obtain labs. HH isn't needed. [facility] abx infusion appt made for 12/22 @ 10:15. Follow-up appt with Dr Tabor made for 1/3/2023 @ 10am. All appt dates/times are in Epic. No other needs/resources voiced. Plan is home today and his spouse will transport.    Final Discharge Disposition Code 01 - home or self-care               Discharge Codes    No documentation.               Expected Discharge Date and Time     Expected Discharge Date Expected Discharge Time    Dec 21, 2022             Bella Brantley, RN

## 2022-12-21 NOTE — PLAN OF CARE
Goal Outcome Evaluation:                 VSS, 3L NC high flow, Alert and oriented. Dressing changed. C/O of pain on IV site and new IV placed.      Problem: Adult Inpatient Plan of Care  Goal: Plan of Care Review  Outcome: Ongoing, Progressing  Goal: Patient-Specific Goal (Individualized)  Outcome: Ongoing, Progressing  Goal: Absence of Hospital-Acquired Illness or Injury  Outcome: Ongoing, Progressing  Intervention: Identify and Manage Fall Risk  Recent Flowsheet Documentation  Taken 12/21/2022 0200 by Clara Long RN  Safety Promotion/Fall Prevention:  • activity supervised  • assistive device/personal items within reach  • clutter free environment maintained  • safety round/check completed  Taken 12/21/2022 0000 by Clara Long RN  Safety Promotion/Fall Prevention:  • activity supervised  • assistive device/personal items within reach  • clutter free environment maintained  • safety round/check completed  Taken 12/20/2022 2200 by Clara Long RN  Safety Promotion/Fall Prevention:  • activity supervised  • assistive device/personal items within reach  • clutter free environment maintained  • safety round/check completed  Taken 12/20/2022 2000 by Clara Long RN  Safety Promotion/Fall Prevention:  • activity supervised  • assistive device/personal items within reach  • clutter free environment maintained  • safety round/check completed  Intervention: Prevent Skin Injury  Recent Flowsheet Documentation  Taken 12/21/2022 0200 by Clara Long RN  Body Position: position changed independently  Skin Protection:  • adhesive use limited  • tubing/devices free from skin contact  • incontinence pads utilized  Taken 12/21/2022 0000 by Clara Long RN  Body Position: position changed independently  Skin Protection:  • adhesive use limited  • tubing/devices free from skin contact  • incontinence pads utilized  Taken 12/20/2022 2200 by Clara Long RN  Body Position: position changed independently  Skin Protection:  •  adhesive use limited  • tubing/devices free from skin contact  • incontinence pads utilized  Taken 12/20/2022 2000 by Clara Long RN  Body Position: position changed independently  Skin Protection:  • adhesive use limited  • tubing/devices free from skin contact  • incontinence pads utilized  Intervention: Prevent and Manage VTE (Venous Thromboembolism) Risk  Recent Flowsheet Documentation  Taken 12/21/2022 0200 by Clara Long RN  Activity Management: activity adjusted per tolerance  Range of Motion: active ROM (range of motion) encouraged  Taken 12/21/2022 0000 by Clara Long RN  Activity Management: activity adjusted per tolerance  Range of Motion: active ROM (range of motion) encouraged  Taken 12/20/2022 2200 by Clara Long RN  Activity Management: activity adjusted per tolerance  Range of Motion: active ROM (range of motion) encouraged  Taken 12/20/2022 2000 by Clara Long RN  Activity Management: activity adjusted per tolerance  Range of Motion: active ROM (range of motion) encouraged  Intervention: Prevent Infection  Recent Flowsheet Documentation  Taken 12/21/2022 0200 by Clara Long RN  Infection Prevention:  • environmental surveillance performed  • hand hygiene promoted  • rest/sleep promoted  • single patient room provided  Taken 12/21/2022 0000 by Clara Long RN  Infection Prevention:  • environmental surveillance performed  • hand hygiene promoted  • rest/sleep promoted  • single patient room provided  Taken 12/20/2022 2200 by Clara Long RN  Infection Prevention:  • environmental surveillance performed  • hand hygiene promoted  • rest/sleep promoted  • single patient room provided  Taken 12/20/2022 2000 by Clara Long RN  Infection Prevention:  • environmental surveillance performed  • hand hygiene promoted  • rest/sleep promoted  • single patient room provided  Goal: Optimal Comfort and Wellbeing  Outcome: Ongoing, Progressing  Intervention: Provide Person-Centered Care  Recent  Flowsheet Documentation  Taken 12/20/2022 2000 by Clara Long RN  Trust Relationship/Rapport:  • care explained  • choices provided  • emotional support provided  • empathic listening provided  • questions answered  • questions encouraged  • reassurance provided  • thoughts/feelings acknowledged  Goal: Readiness for Transition of Care  Outcome: Ongoing, Progressing     Problem: Hypertension Comorbidity  Goal: Blood Pressure in Desired Range  Outcome: Ongoing, Progressing  Intervention: Maintain Blood Pressure Management  Recent Flowsheet Documentation  Taken 12/21/2022 0200 by Calra Long RN  Medication Review/Management: medications reviewed  Taken 12/21/2022 0000 by Clara Long RN  Medication Review/Management: medications reviewed  Taken 12/20/2022 2200 by Clara Long RN  Medication Review/Management: medications reviewed  Taken 12/20/2022 2000 by Clara Long RN  Medication Review/Management: medications reviewed

## 2022-12-22 ENCOUNTER — TRANSITIONAL CARE MANAGEMENT TELEPHONE ENCOUNTER (OUTPATIENT)
Dept: CALL CENTER | Facility: HOSPITAL | Age: 72
End: 2022-12-22

## 2022-12-22 NOTE — OUTREACH NOTE
Prep Survey    Flowsheet Row Responses   [facility] [place] patient discharged from? [place]   Is LACE score < 7 ? No   Eligibility [facility]   Date of Admission 12/16/22   Date of Discharge 12/21/22   Discharge Disposition Home or Self Care   Discharge diagnosis Cellulitis and abscess of neck    Does the patient have one of the following disease processes/diagnoses(primary or secondary)? Other   Does the patient have Home health ordered? No   Is there a DME ordered? Yes   What DME was ordered? [employer] - [employer]   Prep survey completed? Yes          JOEL ANDERS - Registered Nurse

## 2022-12-22 NOTE — OUTREACH NOTE
Call Center TCM Note    Flowsheet Row Responses   [facility] patient discharged from? [place]   Does the patient have one of the following disease processes/diagnoses(primary or secondary)? Other   TCM attempt successful? Yes   Call start time 1030   Call end time 1035   Discharge diagnosis Cellulitis and abscess of neck    Is patient permission given to speak with other caregiver? Yes   List who call center can speak with Caterina spouse    Person spoke with today (if not patient) and relationship Caterina spouse    Meds reviewed with patient/caregiver? Yes   Is the patient having any side effects they believe may be caused by any medication additions or changes? No   Does the patient have all medications ordered at discharge? Yes   Prescription comments Pt receiving IV infusions daily    Is the patient taking all medications as directed (includes completed medication regime)? Yes   Comments Hosp dc fu apt on 12/29/22 with PCP office    Does the patient have an appointment with their PCP within 7 days of discharge? Yes   Has home health visited the patient within 72 hours of discharge? N/A   What DME was ordered? [employer] - [employer]   Has all DME been delivered? Yes   DME comments Patient wears O2 prn - spouse states she plans on purchasing a pulse ox-educated spouse on monitoring frequency and when to seek medical attention    Psychosocial issues? No   Did the patient receive a copy of their discharge instructions? Yes   Nursing interventions Reviewed instructions with patient   What is the patient's perception of their health status since discharge? Improving  [spouse states pt slept very well last night]   Is the patient/caregiver able to teach back signs and symptoms related to disease process for when to call PCP? Yes   Is the patient/caregiver able to teach back signs and symptoms related to disease process for when to call 911? Yes   Is the patient/caregiver able to teach back the hierarchy of who to  call/visit for symptoms/problems? PCP, Specialist, Home health nurse, Urgent Care, ED, 911 Yes   If the patient is a current smoker, are they able to teach back resources for cessation? Not a smoker   TCM call completed? Yes   Call end time 1035          Lindy Alba RN    12/22/2022, 10:37 EST

## 2022-12-25 ENCOUNTER — TRANSCRIBE ORDERS (OUTPATIENT)
Dept: LAB | Facility: HOSPITAL | Age: 72
End: 2022-12-25

## 2022-12-25 ENCOUNTER — LAB (OUTPATIENT)
Dept: LAB | Facility: HOSPITAL | Age: 72
End: 2022-12-25

## 2022-12-25 DIAGNOSIS — L03.221 CELLULITIS AND ABSCESS OF NECK: ICD-10-CM

## 2022-12-25 DIAGNOSIS — L03.221 CELLULITIS AND ABSCESS OF NECK: Primary | ICD-10-CM

## 2022-12-25 DIAGNOSIS — L02.11 CELLULITIS AND ABSCESS OF NECK: ICD-10-CM

## 2022-12-25 DIAGNOSIS — L02.11 CELLULITIS AND ABSCESS OF NECK: Primary | ICD-10-CM

## 2022-12-25 LAB
ALBUMIN SERPL-MCNC: 3.5 G/DL (ref 3.5–5.2)
ALBUMIN/GLOB SERPL: 0.9 G/DL
ALP SERPL-CCNC: 93 U/L (ref 39–117)
ALT SERPL W P-5'-P-CCNC: 41 U/L (ref 1–41)
ANION GAP SERPL CALCULATED.3IONS-SCNC: 6 MMOL/L (ref 5–15)
AST SERPL-CCNC: 34 U/L (ref 1–40)
BASOPHILS # BLD AUTO: 0.04 10*3/MM3 (ref 0–0.2)
BASOPHILS NFR BLD AUTO: 0.4 % (ref 0–1.5)
BILIRUB SERPL-MCNC: 0.4 MG/DL (ref 0–1.2)
BUN SERPL-MCNC: 20 MG/DL (ref 8–23)
BUN/CREAT SERPL: 27.8 (ref 7–25)
CALCIUM SPEC-SCNC: 9.4 MG/DL (ref 8.6–10.5)
CHLORIDE SERPL-SCNC: 94 MMOL/L (ref 98–107)
CK SERPL-CCNC: 48 U/L (ref 20–200)
CO2 SERPL-SCNC: 36 MMOL/L (ref 22–29)
CREAT SERPL-MCNC: 0.72 MG/DL (ref 0.76–1.27)
CRP SERPL-MCNC: 1.76 MG/DL (ref 0–0.5)
DEPRECATED RDW RBC AUTO: 43.8 FL (ref 37–54)
EGFRCR SERPLBLD CKD-EPI 2021: 98.3 ML/MIN/1.73
EOSINOPHIL # BLD AUTO: 0.09 10*3/MM3 (ref 0–0.4)
EOSINOPHIL NFR BLD AUTO: 0.9 % (ref 0.3–6.2)
ERYTHROCYTE [DISTWIDTH] IN BLOOD BY AUTOMATED COUNT: 12.5 % (ref 12.3–15.4)
ERYTHROCYTE [SEDIMENTATION RATE] IN BLOOD: 71 MM/HR (ref 0–20)
GLOBULIN UR ELPH-MCNC: 4 GM/DL
GLUCOSE SERPL-MCNC: 106 MG/DL (ref 65–99)
HCT VFR BLD AUTO: 39.9 % (ref 37.5–51)
HGB BLD-MCNC: 12.6 G/DL (ref 13–17.7)
IMM GRANULOCYTES # BLD AUTO: 0.07 10*3/MM3 (ref 0–0.05)
IMM GRANULOCYTES NFR BLD AUTO: 0.7 % (ref 0–0.5)
LYMPHOCYTES # BLD AUTO: 1.01 10*3/MM3 (ref 0.7–3.1)
LYMPHOCYTES NFR BLD AUTO: 9.6 % (ref 19.6–45.3)
MCH RBC QN AUTO: 30.1 PG (ref 26.6–33)
MCHC RBC AUTO-ENTMCNC: 31.6 G/DL (ref 31.5–35.7)
MCV RBC AUTO: 95.5 FL (ref 79–97)
MONOCYTES # BLD AUTO: 0.6 10*3/MM3 (ref 0.1–0.9)
MONOCYTES NFR BLD AUTO: 5.7 % (ref 5–12)
NEUTROPHILS NFR BLD AUTO: 8.67 10*3/MM3 (ref 1.7–7)
NEUTROPHILS NFR BLD AUTO: 82.7 % (ref 42.7–76)
NRBC BLD AUTO-RTO: 0 /100 WBC (ref 0–0.2)
PLATELET # BLD AUTO: 323 10*3/MM3 (ref 140–450)
PMV BLD AUTO: 9.7 FL (ref 6–12)
POTASSIUM SERPL-SCNC: 4.4 MMOL/L (ref 3.5–5.2)
PROT SERPL-MCNC: 7.5 G/DL (ref 6–8.5)
RBC # BLD AUTO: 4.18 10*6/MM3 (ref 4.14–5.8)
SODIUM SERPL-SCNC: 136 MMOL/L (ref 136–145)
WBC NRBC COR # BLD: 10.48 10*3/MM3 (ref 3.4–10.8)

## 2022-12-25 PROCEDURE — 36415 COLL VENOUS BLD VENIPUNCTURE: CPT

## 2022-12-25 PROCEDURE — 85652 RBC SED RATE AUTOMATED: CPT

## 2022-12-25 PROCEDURE — 82550 ASSAY OF CK (CPK): CPT

## 2022-12-25 PROCEDURE — 85025 COMPLETE CBC W/AUTO DIFF WBC: CPT

## 2022-12-25 PROCEDURE — 80053 COMPREHEN METABOLIC PANEL: CPT

## 2022-12-25 PROCEDURE — 86140 C-REACTIVE PROTEIN: CPT

## 2022-12-27 ENCOUNTER — HOME HEALTH ADMISSION (OUTPATIENT)
Dept: HOME HEALTH SERVICES | Facility: HOME HEALTHCARE | Age: 72
End: 2022-12-27

## 2022-12-27 DIAGNOSIS — N52.03 COMBINED ARTERIAL INSUFFICIENCY AND CORPORO-VENOUS OCCLUSIVE ERECTILE DYSFUNCTION: ICD-10-CM

## 2022-12-27 NOTE — TELEPHONE ENCOUNTER
Rx Refill Note  Requested Prescriptions     Pending Prescriptions Disp Refills   • sildenafil (VIAGRA) 100 MG tablet [Pharmacy Med Name: Sildenafil Citrate 100 MG Oral Tablet] 6 tablet 0     Sig: TAKE 1/2-1 TABLET BY MOUTH  DAILY AS NEEDED FOR  ERECTILE  DYSFUNCTION   • levothyroxine (SYNTHROID, LEVOTHROID) 75 MCG tablet [Pharmacy Med Name: Levothyroxine Sodium 75 MCG Oral Tablet] 90 tablet 0     Sig: Take 1 tablet by mouth once daily      Last office visit with prescribing clinician: 12/16/2022   Last telemedicine visit with prescribing clinician: 12/29/2022   Next office visit with prescribing clinician: Visit date not found                         Would you like a call back once the refill request has been completed: [] Yes [] No    If the office needs to give you a call back, can they leave a voicemail: [] Yes [] No    Aubrey Concepcion MA  12/27/22, 16:40 EST

## 2022-12-29 ENCOUNTER — READMISSION MANAGEMENT (OUTPATIENT)
Dept: CALL CENTER | Facility: HOSPITAL | Age: 72
End: 2022-12-29

## 2022-12-29 ENCOUNTER — OFFICE VISIT (OUTPATIENT)
Dept: FAMILY MEDICINE CLINIC | Facility: CLINIC | Age: 72
End: 2022-12-29

## 2022-12-29 VITALS
DIASTOLIC BLOOD PRESSURE: 60 MMHG | TEMPERATURE: 97.6 F | HEIGHT: 71 IN | HEART RATE: 61 BPM | SYSTOLIC BLOOD PRESSURE: 90 MMHG | WEIGHT: 156 LBS | BODY MASS INDEX: 21.84 KG/M2 | OXYGEN SATURATION: 98 %

## 2022-12-29 DIAGNOSIS — J42 CHRONIC BRONCHITIS, UNSPECIFIED CHRONIC BRONCHITIS TYPE: ICD-10-CM

## 2022-12-29 DIAGNOSIS — L02.11 CELLULITIS AND ABSCESS OF NECK: ICD-10-CM

## 2022-12-29 DIAGNOSIS — Z09 HOSPITAL DISCHARGE FOLLOW-UP: Primary | ICD-10-CM

## 2022-12-29 DIAGNOSIS — L03.221 CELLULITIS AND ABSCESS OF NECK: ICD-10-CM

## 2022-12-29 DIAGNOSIS — T17.800D ASPIRATION INTO LOWER RESPIRATORY TRACT, SUBSEQUENT ENCOUNTER: ICD-10-CM

## 2022-12-29 DIAGNOSIS — N52.03 COMBINED ARTERIAL INSUFFICIENCY AND CORPORO-VENOUS OCCLUSIVE ERECTILE DYSFUNCTION: ICD-10-CM

## 2022-12-29 PROCEDURE — 99495 TRANSJ CARE MGMT MOD F2F 14D: CPT | Performed by: FAMILY MEDICINE

## 2022-12-29 RX ORDER — LEVOTHYROXINE SODIUM 0.07 MG/1
75 TABLET ORAL DAILY
Qty: 90 TABLET | Refills: 1 | Status: SHIPPED | OUTPATIENT
Start: 2022-12-29 | End: 2023-03-27 | Stop reason: SDUPTHER

## 2022-12-29 RX ORDER — SILDENAFIL 100 MG/1
100 TABLET, FILM COATED ORAL DAILY PRN
Qty: 30 TABLET | Refills: 3 | Status: SHIPPED | OUTPATIENT
Start: 2022-12-29

## 2022-12-29 RX ORDER — ALBUTEROL SULFATE 2.5 MG/3ML
2.5 SOLUTION RESPIRATORY (INHALATION) EVERY 4 HOURS PRN
Qty: 30 EACH | Refills: 5 | Status: SHIPPED | OUTPATIENT
Start: 2022-12-29

## 2022-12-29 NOTE — PROGRESS NOTES
Transitional Care Follow Up Visit  Subjective     Lucian Bermudez is a 72 y.o. male who presents for a transitional care management visit.    Within 48 business hours after discharge our office contacted him via telephone to coordinate his care and needs.      I reviewed and discussed the details of that call along with the discharge summary, hospital problems, inpatient lab results, inpatient diagnostic studies, and consultation reports with Lucian.     Current outpatient and discharge medications have been reconciled for the patient.  Reviewed by: Aria Cordova DO      Date of TCM Phone Call 12/21/2022   [facility]   Date of Admission 12/16/2022   Date of Discharge 12/21/2022   Discharge Disposition Home or Self Care     Risk for Readmission (LACE) Score: 11 (12/21/2022  6:00 AM)      History of Present Illness   Course During Hospital Stay:      Patient is a very pleasant 72-year-old man who presents today to follow-up after a admission due to cellulitis of the neck.  He was admitted to our facility 12/16/2022 and discharged 12/21/2022.  He underwent aspiration of the abscess with debridement via ENT cultures positive for Corynebacterium.  He was evaluated by infectious disease and placed on IV antibiotics with a PICC line.  It is also felt that he suffered a aspiration pneumonia due to the compression secondary to abscess.  He was placed on oxygen while inpatient.  He was again placed on IV antibiotics.    He was  discharged home with a PICC line and antibiotics Ivanz and Daptomycin, he is following with the infusion center to have his antibiotics administered.     The following portions of the patient's history were reviewed and updated as appropriate: allergies, current medications, past family history, past medical history, past social history, past surgical history and problem list.    Review of Systems   Constitutional: Negative for activity change, appetite change and fatigue.    Respiratory: Negative for cough, chest tightness, shortness of breath and wheezing.    Cardiovascular: Negative for chest pain, palpitations and leg swelling.   Gastrointestinal: Negative for abdominal pain, blood in stool, constipation and diarrhea.   Allergic/Immunologic: Negative for environmental allergies.   Neurological: Negative for dizziness, facial asymmetry, light-headedness and numbness.   Psychiatric/Behavioral: Negative for agitation, hallucinations, self-injury and suicidal ideas.       Objective   Physical Exam  Constitutional:       Appearance: He is normal weight.   HENT:      Head: Normocephalic.      Right Ear: Tympanic membrane normal.      Mouth/Throat:      Mouth: Mucous membranes are moist.   Cardiovascular:      Rate and Rhythm: Normal rate and regular rhythm.      Pulses: Normal pulses.      Heart sounds: No murmur heard.  Pulmonary:      Effort: Pulmonary effort is normal. No respiratory distress.      Breath sounds: No wheezing.   Abdominal:      General: Abdomen is flat. Bowel sounds are normal.   Musculoskeletal:         General: No swelling or tenderness.      Cervical back: Normal range of motion.   Skin:     Capillary Refill: Capillary refill takes less than 2 seconds.      Comments: Abscess right neck with scant purulent drainage, dressing intact    Neurological:      General: No focal deficit present.      Mental Status: He is alert. Mental status is at baseline.   Psychiatric:         Mood and Affect: Mood normal.         Behavior: Behavior normal.         Thought Content: Thought content normal.         Judgment: Judgment normal.         Assessment & Plan     Diagnoses and all orders for this visit:    1. Hospital discharge follow-up (Primary)  Assessment & Plan:  Hospital stay records were reviewed  Patient reports that he is feeling better slightly and has no acute complaints today.    Advised him to follow-up with antibiotic administration    While inpatient he was requiring  oxygen supplementation due to pneumonia.  He is on room air today and is able to maintain his sats he reports his saturations at home are 95 to 98%      Advised patient that if he becomes more hypoxic, weak, blood pressure becomes lower than what it is now etc. he is to report back to the emergency room        2. Cellulitis and abscess of neck    3. Aspiration into lower respiratory tract, subsequent encounter    4. Chronic bronchitis, unspecified chronic bronchitis type (HCC)  -     albuterol (PROVENTIL) (2.5 MG/3ML) 0.083% nebulizer solution; Take 2.5 mg by nebulization Every 4 (Four) Hours As Needed for Wheezing.  Dispense: 30 each; Refill: 5    5. Combined arterial insufficiency and corporo-venous occlusive erectile dysfunction  -     sildenafil (VIAGRA) 100 MG tablet; Take 1 tablet by mouth Daily As Needed for Erectile Dysfunction (Take 1/2 to 1 tablet as needed for ED).  Dispense: 30 tablet; Refill: 3    Other orders  -     levothyroxine (SYNTHROID, LEVOTHROID) 75 MCG tablet; Take 1 tablet by mouth Daily.  Dispense: 90 tablet; Refill: 1             This document has been electronically signed by Aria Cordova DO   December 29, 2022 10:57 EST    Dictated Utilizing Dragon Dictation: Part of this note may be an electronic transcription/translation of spoken language to printed text using the Dragon Dictation System.    Aria Cordova D.O.  [facility]

## 2022-12-29 NOTE — ASSESSMENT & PLAN NOTE
Hospital stay records were reviewed  Patient reports that he is feeling better slightly and has no acute complaints today.    Advised him to follow-up with antibiotic administration    While inpatient he was requiring oxygen supplementation due to pneumonia.  He is on room air today and is able to maintain his sats he reports his saturations at home are 95 to 98%      Advised patient that if he becomes more hypoxic, weak, blood pressure becomes lower than what it is now etc. he is to report back to the emergency room

## 2022-12-29 NOTE — OUTREACH NOTE
Medical Week 2 Survey    Flowsheet Row Responses   [facility] patient discharged from? [place]   Does the patient have one of the following disease processes/diagnoses(primary or secondary)? Other   Week 2 attempt successful? Yes   Call start time 1614   Discharge diagnosis Cellulitis and abscess of neck    Call end time 1616   Meds reviewed with patient/caregiver? Yes   Is the patient taking all medications as directed (includes completed medication regime)? Yes   Does the patient have a primary care provider?  Yes   Comments regarding PCP PCP apt today, 12/29/22   Has the patient kept scheduled appointments due by today? Yes   Psychosocial issues? No   What is the patient's perception of their health status since discharge? Improving   Week 2 Call Completed? Yes   Graduated Yes   Was the number of calls appropriate? Yes   Graduated/Revoked comments No issues or concerns during call-pt had his FU apt today with his PCP (12/29/22)          PETER CARROLL - Registered Nurse

## 2023-01-01 ENCOUNTER — LAB (OUTPATIENT)
Dept: LAB | Facility: HOSPITAL | Age: 73
End: 2023-01-01
Payer: MEDICARE

## 2023-01-01 ENCOUNTER — TRANSCRIBE ORDERS (OUTPATIENT)
Dept: LAB | Facility: HOSPITAL | Age: 73
End: 2023-01-01
Payer: MEDICARE

## 2023-01-01 DIAGNOSIS — L03.221 CELLULITIS AND ABSCESS OF NECK: ICD-10-CM

## 2023-01-01 DIAGNOSIS — L02.11 CELLULITIS AND ABSCESS OF NECK: Primary | ICD-10-CM

## 2023-01-01 DIAGNOSIS — L02.11 CELLULITIS AND ABSCESS OF NECK: ICD-10-CM

## 2023-01-01 DIAGNOSIS — L03.221 CELLULITIS AND ABSCESS OF NECK: Primary | ICD-10-CM

## 2023-01-01 LAB
ALBUMIN SERPL-MCNC: 3.7 G/DL (ref 3.5–5.2)
ALBUMIN/GLOB SERPL: 1 G/DL
ALP SERPL-CCNC: 102 U/L (ref 39–117)
ALT SERPL W P-5'-P-CCNC: 18 U/L (ref 1–41)
ANION GAP SERPL CALCULATED.3IONS-SCNC: 6 MMOL/L (ref 5–15)
AST SERPL-CCNC: 20 U/L (ref 1–40)
BASOPHILS # BLD AUTO: 0.02 10*3/MM3 (ref 0–0.2)
BASOPHILS NFR BLD AUTO: 0.2 % (ref 0–1.5)
BILIRUB SERPL-MCNC: 0.7 MG/DL (ref 0–1.2)
BUN SERPL-MCNC: 24 MG/DL (ref 8–23)
BUN/CREAT SERPL: 27.6 (ref 7–25)
CALCIUM SPEC-SCNC: 9.3 MG/DL (ref 8.6–10.5)
CHLORIDE SERPL-SCNC: 97 MMOL/L (ref 98–107)
CK SERPL-CCNC: 34 U/L (ref 20–200)
CO2 SERPL-SCNC: 34 MMOL/L (ref 22–29)
CREAT SERPL-MCNC: 0.87 MG/DL (ref 0.76–1.27)
CRP SERPL-MCNC: 0.66 MG/DL (ref 0–0.5)
DEPRECATED RDW RBC AUTO: 47.2 FL (ref 37–54)
EGFRCR SERPLBLD CKD-EPI 2021: 92.8 ML/MIN/1.73
EOSINOPHIL # BLD AUTO: 0.15 10*3/MM3 (ref 0–0.4)
EOSINOPHIL NFR BLD AUTO: 1.7 % (ref 0.3–6.2)
ERYTHROCYTE [DISTWIDTH] IN BLOOD BY AUTOMATED COUNT: 13.2 % (ref 12.3–15.4)
ERYTHROCYTE [SEDIMENTATION RATE] IN BLOOD: 86 MM/HR (ref 0–20)
GLOBULIN UR ELPH-MCNC: 3.8 GM/DL
GLUCOSE SERPL-MCNC: 87 MG/DL (ref 65–99)
HCT VFR BLD AUTO: 42.3 % (ref 37.5–51)
HGB BLD-MCNC: 13 G/DL (ref 13–17.7)
IMM GRANULOCYTES # BLD AUTO: 0.02 10*3/MM3 (ref 0–0.05)
IMM GRANULOCYTES NFR BLD AUTO: 0.2 % (ref 0–0.5)
LYMPHOCYTES # BLD AUTO: 0.98 10*3/MM3 (ref 0.7–3.1)
LYMPHOCYTES NFR BLD AUTO: 11.3 % (ref 19.6–45.3)
MCH RBC QN AUTO: 30.1 PG (ref 26.6–33)
MCHC RBC AUTO-ENTMCNC: 30.7 G/DL (ref 31.5–35.7)
MCV RBC AUTO: 97.9 FL (ref 79–97)
MONOCYTES # BLD AUTO: 0.61 10*3/MM3 (ref 0.1–0.9)
MONOCYTES NFR BLD AUTO: 7.1 % (ref 5–12)
NEUTROPHILS NFR BLD AUTO: 6.86 10*3/MM3 (ref 1.7–7)
NEUTROPHILS NFR BLD AUTO: 79.5 % (ref 42.7–76)
NRBC BLD AUTO-RTO: 0 /100 WBC (ref 0–0.2)
PLATELET # BLD AUTO: 298 10*3/MM3 (ref 140–450)
PMV BLD AUTO: 9.6 FL (ref 6–12)
POTASSIUM SERPL-SCNC: 4.5 MMOL/L (ref 3.5–5.2)
PROT SERPL-MCNC: 7.5 G/DL (ref 6–8.5)
RBC # BLD AUTO: 4.32 10*6/MM3 (ref 4.14–5.8)
SODIUM SERPL-SCNC: 137 MMOL/L (ref 136–145)
WBC NRBC COR # BLD: 8.64 10*3/MM3 (ref 3.4–10.8)

## 2023-01-01 PROCEDURE — 80053 COMPREHEN METABOLIC PANEL: CPT

## 2023-01-01 PROCEDURE — 82550 ASSAY OF CK (CPK): CPT

## 2023-01-01 PROCEDURE — 36415 COLL VENOUS BLD VENIPUNCTURE: CPT

## 2023-01-01 PROCEDURE — 86140 C-REACTIVE PROTEIN: CPT

## 2023-01-01 PROCEDURE — 85652 RBC SED RATE AUTOMATED: CPT

## 2023-01-01 PROCEDURE — 85025 COMPLETE CBC W/AUTO DIFF WBC: CPT

## 2023-01-03 ENCOUNTER — TRANSCRIBE ORDERS (OUTPATIENT)
Dept: ADMINISTRATIVE | Facility: HOSPITAL | Age: 73
End: 2023-01-03
Payer: MEDICARE

## 2023-01-03 DIAGNOSIS — L02.11 ABSCESS OF NECK: Primary | ICD-10-CM

## 2023-01-04 RX ORDER — LEVOTHYROXINE SODIUM 0.07 MG/1
TABLET ORAL
Qty: 90 TABLET | Refills: 0 | OUTPATIENT
Start: 2023-01-04

## 2023-01-04 RX ORDER — SILDENAFIL 100 MG/1
TABLET, FILM COATED ORAL
Qty: 6 TABLET | Refills: 0 | OUTPATIENT
Start: 2023-01-04

## 2023-01-09 ENCOUNTER — HOSPITAL ENCOUNTER (OUTPATIENT)
Dept: CT IMAGING | Facility: HOSPITAL | Age: 73
Discharge: HOME | End: 2023-01-09
Payer: MEDICARE

## 2023-01-09 ENCOUNTER — TRANSCRIBE ORDERS (OUTPATIENT)
Dept: LAB | Facility: HOSPITAL | Age: 73
End: 2023-01-09
Payer: MEDICARE

## 2023-01-09 ENCOUNTER — LAB (OUTPATIENT)
Dept: LAB | Facility: HOSPITAL | Age: 73
End: 2023-01-09
Payer: MEDICARE

## 2023-01-09 DIAGNOSIS — L02.11 CELLULITIS AND ABSCESS OF NECK: Primary | ICD-10-CM

## 2023-01-09 DIAGNOSIS — L02.11 CELLULITIS AND ABSCESS OF NECK: ICD-10-CM

## 2023-01-09 DIAGNOSIS — L02.11 ABSCESS OF NECK: ICD-10-CM

## 2023-01-09 DIAGNOSIS — L03.221 CELLULITIS AND ABSCESS OF NECK: Primary | ICD-10-CM

## 2023-01-09 DIAGNOSIS — L03.221 CELLULITIS AND ABSCESS OF NECK: ICD-10-CM

## 2023-01-09 LAB
ALBUMIN SERPL-MCNC: 3.5 G/DL (ref 3.5–5.2)
ALBUMIN/GLOB SERPL: 0.9 G/DL
ALP SERPL-CCNC: 109 U/L (ref 39–117)
ALT SERPL W P-5'-P-CCNC: 13 U/L (ref 1–41)
ANION GAP SERPL CALCULATED.3IONS-SCNC: 5 MMOL/L (ref 5–15)
AST SERPL-CCNC: 19 U/L (ref 1–40)
BASOPHILS # BLD AUTO: 0.03 10*3/MM3 (ref 0–0.2)
BASOPHILS NFR BLD AUTO: 0.4 % (ref 0–1.5)
BILIRUB SERPL-MCNC: 0.8 MG/DL (ref 0–1.2)
BUN SERPL-MCNC: 26 MG/DL (ref 8–23)
BUN/CREAT SERPL: 34.7 (ref 7–25)
CALCIUM SPEC-SCNC: 9.2 MG/DL (ref 8.6–10.5)
CHLORIDE SERPL-SCNC: 98 MMOL/L (ref 98–107)
CK SERPL-CCNC: 44 U/L (ref 20–200)
CO2 SERPL-SCNC: 35 MMOL/L (ref 22–29)
CREAT SERPL-MCNC: 0.75 MG/DL (ref 0.76–1.27)
CRP SERPL-MCNC: 0.38 MG/DL (ref 0–0.5)
DEPRECATED RDW RBC AUTO: 47.8 FL (ref 37–54)
EGFRCR SERPLBLD CKD-EPI 2021: 97.1 ML/MIN/1.73
EOSINOPHIL # BLD AUTO: 0.23 10*3/MM3 (ref 0–0.4)
EOSINOPHIL NFR BLD AUTO: 3.3 % (ref 0.3–6.2)
ERYTHROCYTE [DISTWIDTH] IN BLOOD BY AUTOMATED COUNT: 13.3 % (ref 12.3–15.4)
ERYTHROCYTE [SEDIMENTATION RATE] IN BLOOD: 57 MM/HR (ref 0–20)
GLOBULIN UR ELPH-MCNC: 3.8 GM/DL
GLUCOSE SERPL-MCNC: 116 MG/DL (ref 65–99)
HCT VFR BLD AUTO: 42 % (ref 37.5–51)
HGB BLD-MCNC: 13 G/DL (ref 13–17.7)
IMM GRANULOCYTES # BLD AUTO: 0.01 10*3/MM3 (ref 0–0.05)
IMM GRANULOCYTES NFR BLD AUTO: 0.1 % (ref 0–0.5)
LYMPHOCYTES # BLD AUTO: 1.01 10*3/MM3 (ref 0.7–3.1)
LYMPHOCYTES NFR BLD AUTO: 14.4 % (ref 19.6–45.3)
MCH RBC QN AUTO: 30.4 PG (ref 26.6–33)
MCHC RBC AUTO-ENTMCNC: 31 G/DL (ref 31.5–35.7)
MCV RBC AUTO: 98.1 FL (ref 79–97)
MONOCYTES # BLD AUTO: 0.64 10*3/MM3 (ref 0.1–0.9)
MONOCYTES NFR BLD AUTO: 9.1 % (ref 5–12)
NEUTROPHILS NFR BLD AUTO: 5.1 10*3/MM3 (ref 1.7–7)
NEUTROPHILS NFR BLD AUTO: 72.7 % (ref 42.7–76)
NRBC BLD AUTO-RTO: 0 /100 WBC (ref 0–0.2)
PLATELET # BLD AUTO: 208 10*3/MM3 (ref 140–450)
PMV BLD AUTO: 10 FL (ref 6–12)
POTASSIUM SERPL-SCNC: 4.8 MMOL/L (ref 3.5–5.2)
PROT SERPL-MCNC: 7.3 G/DL (ref 6–8.5)
RBC # BLD AUTO: 4.28 10*6/MM3 (ref 4.14–5.8)
SODIUM SERPL-SCNC: 138 MMOL/L (ref 136–145)
WBC NRBC COR # BLD: 7.02 10*3/MM3 (ref 3.4–10.8)

## 2023-01-09 PROCEDURE — 82550 ASSAY OF CK (CPK): CPT

## 2023-01-09 PROCEDURE — 85652 RBC SED RATE AUTOMATED: CPT

## 2023-01-09 PROCEDURE — 36415 COLL VENOUS BLD VENIPUNCTURE: CPT

## 2023-01-09 PROCEDURE — 80053 COMPREHEN METABOLIC PANEL: CPT

## 2023-01-09 PROCEDURE — 86140 C-REACTIVE PROTEIN: CPT

## 2023-01-09 PROCEDURE — 70490 CT SOFT TISSUE NECK W/O DYE: CPT

## 2023-01-09 PROCEDURE — 85025 COMPLETE CBC W/AUTO DIFF WBC: CPT

## 2023-01-11 ENCOUNTER — APPOINTMENT (OUTPATIENT)
Dept: CT IMAGING | Facility: HOSPITAL | Age: 73
End: 2023-01-11
Payer: MEDICARE

## 2023-01-15 ENCOUNTER — LAB (OUTPATIENT)
Dept: LAB | Facility: HOSPITAL | Age: 73
End: 2023-01-15
Payer: MEDICARE

## 2023-01-15 ENCOUNTER — TRANSCRIBE ORDERS (OUTPATIENT)
Dept: LAB | Facility: HOSPITAL | Age: 73
End: 2023-01-15
Payer: MEDICARE

## 2023-01-15 DIAGNOSIS — L03.221 CELLULITIS AND ABSCESS OF NECK: Primary | ICD-10-CM

## 2023-01-15 DIAGNOSIS — L03.221 CELLULITIS AND ABSCESS OF NECK: ICD-10-CM

## 2023-01-15 DIAGNOSIS — L02.11 CELLULITIS AND ABSCESS OF NECK: Primary | ICD-10-CM

## 2023-01-15 DIAGNOSIS — L02.11 CELLULITIS AND ABSCESS OF NECK: ICD-10-CM

## 2023-01-15 LAB
ALBUMIN SERPL-MCNC: 3.4 G/DL (ref 3.5–5.2)
ALBUMIN/GLOB SERPL: 0.9 G/DL
ALP SERPL-CCNC: 94 U/L (ref 39–117)
ALT SERPL W P-5'-P-CCNC: 14 U/L (ref 1–41)
ANION GAP SERPL CALCULATED.3IONS-SCNC: 5 MMOL/L (ref 5–15)
AST SERPL-CCNC: 20 U/L (ref 1–40)
BASOPHILS # BLD AUTO: 0.02 10*3/MM3 (ref 0–0.2)
BASOPHILS NFR BLD AUTO: 0.3 % (ref 0–1.5)
BILIRUB SERPL-MCNC: 1.1 MG/DL (ref 0–1.2)
BUN SERPL-MCNC: 26 MG/DL (ref 8–23)
BUN/CREAT SERPL: 29.2 (ref 7–25)
CALCIUM SPEC-SCNC: 9.3 MG/DL (ref 8.6–10.5)
CHLORIDE SERPL-SCNC: 98 MMOL/L (ref 98–107)
CK SERPL-CCNC: 41 U/L (ref 20–200)
CO2 SERPL-SCNC: 35 MMOL/L (ref 22–29)
CREAT SERPL-MCNC: 0.89 MG/DL (ref 0.76–1.27)
CRP SERPL-MCNC: 3.48 MG/DL (ref 0–0.5)
DEPRECATED RDW RBC AUTO: 49.4 FL (ref 37–54)
EGFRCR SERPLBLD CKD-EPI 2021: 92.2 ML/MIN/1.73
EOSINOPHIL # BLD AUTO: 0.31 10*3/MM3 (ref 0–0.4)
EOSINOPHIL NFR BLD AUTO: 4.4 % (ref 0.3–6.2)
ERYTHROCYTE [DISTWIDTH] IN BLOOD BY AUTOMATED COUNT: 13.7 % (ref 12.3–15.4)
ERYTHROCYTE [SEDIMENTATION RATE] IN BLOOD: 60 MM/HR (ref 0–20)
GLOBULIN UR ELPH-MCNC: 3.8 GM/DL
GLUCOSE SERPL-MCNC: 122 MG/DL (ref 65–99)
HCT VFR BLD AUTO: 43 % (ref 37.5–51)
HGB BLD-MCNC: 13.4 G/DL (ref 13–17.7)
IMM GRANULOCYTES # BLD AUTO: 0.01 10*3/MM3 (ref 0–0.05)
IMM GRANULOCYTES NFR BLD AUTO: 0.1 % (ref 0–0.5)
LYMPHOCYTES # BLD AUTO: 0.72 10*3/MM3 (ref 0.7–3.1)
LYMPHOCYTES NFR BLD AUTO: 10.2 % (ref 19.6–45.3)
MCH RBC QN AUTO: 30.5 PG (ref 26.6–33)
MCHC RBC AUTO-ENTMCNC: 31.2 G/DL (ref 31.5–35.7)
MCV RBC AUTO: 97.9 FL (ref 79–97)
MONOCYTES # BLD AUTO: 0.57 10*3/MM3 (ref 0.1–0.9)
MONOCYTES NFR BLD AUTO: 8.1 % (ref 5–12)
NEUTROPHILS NFR BLD AUTO: 5.4 10*3/MM3 (ref 1.7–7)
NEUTROPHILS NFR BLD AUTO: 76.9 % (ref 42.7–76)
NRBC BLD AUTO-RTO: 0 /100 WBC (ref 0–0.2)
PLATELET # BLD AUTO: 188 10*3/MM3 (ref 140–450)
PMV BLD AUTO: 10.2 FL (ref 6–12)
POTASSIUM SERPL-SCNC: 4.3 MMOL/L (ref 3.5–5.2)
PROT SERPL-MCNC: 7.2 G/DL (ref 6–8.5)
RBC # BLD AUTO: 4.39 10*6/MM3 (ref 4.14–5.8)
SODIUM SERPL-SCNC: 138 MMOL/L (ref 136–145)
WBC NRBC COR # BLD: 7.03 10*3/MM3 (ref 3.4–10.8)

## 2023-01-15 PROCEDURE — 85652 RBC SED RATE AUTOMATED: CPT

## 2023-01-15 PROCEDURE — 86140 C-REACTIVE PROTEIN: CPT

## 2023-01-15 PROCEDURE — 36415 COLL VENOUS BLD VENIPUNCTURE: CPT

## 2023-01-15 PROCEDURE — 80053 COMPREHEN METABOLIC PANEL: CPT

## 2023-01-15 PROCEDURE — 85025 COMPLETE CBC W/AUTO DIFF WBC: CPT

## 2023-01-15 PROCEDURE — 82550 ASSAY OF CK (CPK): CPT

## 2023-03-27 RX ORDER — LEVOTHYROXINE SODIUM 0.07 MG/1
75 TABLET ORAL DAILY
Qty: 90 TABLET | Refills: 1 | Status: SHIPPED | OUTPATIENT
Start: 2023-03-27

## 2023-03-27 NOTE — TELEPHONE ENCOUNTER
Caller: Caterina Bermudez    Relationship: Emergency Contact    Best call back number: [phone]    Requested Prescriptions:   Requested Prescriptions     Pending Prescriptions Disp Refills   • levothyroxine (SYNTHROID, LEVOTHROID) 75 MCG tablet 90 tablet 1     Sig: Take 1 tablet by mouth Daily.        Pharmacy where request should be sent: [facility] [address] [place] [address] [phone] [facility] [phone]      Last office visit with prescribing clinician: 12/16/2022   Last telemedicine visit with prescribing clinician: Visit date not found   Next office visit with prescribing clinician: 1/29/2024     Additional details provided by patient: PATIENT NEEDS REFILL AND IS IN [place]    Does the patient have less than a 3 day supply:  [] Yes  [x] No    Would you like a call back once the refill request has been completed: [x] Yes [] No    If the office needs to give you a call back, can they leave a voicemail: [x] Yes [] No    Stevo Gonzalez Rep   03/27/23 11:26 EDT

## 2023-08-28 ENCOUNTER — OFFICE VISIT (OUTPATIENT)
Dept: FAMILY MEDICINE CLINIC | Facility: CLINIC | Age: 73
End: 2023-08-28
Payer: MEDICARE

## 2023-08-28 VITALS
TEMPERATURE: 98.1 F | BODY MASS INDEX: 20.88 KG/M2 | HEIGHT: 72 IN | SYSTOLIC BLOOD PRESSURE: 128 MMHG | HEART RATE: 89 BPM | DIASTOLIC BLOOD PRESSURE: 60 MMHG | OXYGEN SATURATION: 98 % | WEIGHT: 154.2 LBS

## 2023-08-28 DIAGNOSIS — R05.1 ACUTE COUGH: ICD-10-CM

## 2023-08-28 DIAGNOSIS — M25.562 ACUTE PAIN OF LEFT KNEE: Primary | ICD-10-CM

## 2023-08-28 DIAGNOSIS — J18.9 PNEUMONIA OF LEFT LUNG DUE TO INFECTIOUS ORGANISM, UNSPECIFIED PART OF LUNG: ICD-10-CM

## 2023-08-28 PROBLEM — E88.09 HYPOALBUMINEMIA: Status: ACTIVE | Noted: 2022-12-29

## 2023-08-28 PROCEDURE — 3074F SYST BP LT 130 MM HG: CPT | Performed by: FAMILY MEDICINE

## 2023-08-28 PROCEDURE — 3078F DIAST BP <80 MM HG: CPT | Performed by: FAMILY MEDICINE

## 2023-08-28 PROCEDURE — 99214 OFFICE O/P EST MOD 30 MIN: CPT | Performed by: FAMILY MEDICINE

## 2023-08-28 PROCEDURE — 1159F MED LIST DOCD IN RCRD: CPT | Performed by: FAMILY MEDICINE

## 2023-08-28 PROCEDURE — 1160F RVW MEDS BY RX/DR IN RCRD: CPT | Performed by: FAMILY MEDICINE

## 2023-08-28 RX ORDER — AMOXICILLIN AND CLAVULANATE POTASSIUM 250; 62.5 MG/5ML; MG/5ML
500 POWDER, FOR SUSPENSION ORAL 3 TIMES DAILY
Qty: 300 ML | Refills: 0 | Status: SHIPPED | OUTPATIENT
Start: 2023-08-28 | End: 2023-09-07

## 2023-08-28 RX ORDER — PREDNISONE 5 MG/ML
20 SOLUTION ORAL 2 TIMES DAILY
Qty: 200 ML | Refills: 0 | Status: SHIPPED | OUTPATIENT
Start: 2023-08-28 | End: 2023-09-02

## 2023-08-28 NOTE — ASSESSMENT & PLAN NOTE
Given his history and exam findings there is concern for pneumonia.  I am treating with prednisone and Augmentin.  Chest x-ray to be obtained in office today.

## 2023-08-28 NOTE — PROGRESS NOTES
Answers submitted by the patient for this visit:  Primary Reason for Visit (Submitted on 8/27/2023)  What is the primary reason for your visit?: Other  Other (Submitted on 8/27/2023)  Please describe your symptoms.: Left knee,  Have you had these symptoms before?: No  How long have you been having these symptoms?: 5-7 days        Subjective     Chief Complaint  Knee Pain (Left ) and URI    Subjective          Lucian Bermudez is a 73 y.o. male who presents today to [facility] for follow up.    HPI:   History of Present Illness    Mr. Bermudez is a very pleasant 73-year-old male who presents today with complaint of left knee pain for the last 1 week.  He reports he has been having knee pain off and on for the last few years especially with walking up stairs.  He reports that he tried to play golf last week and his knee pain has gotten worse.  He also has pain behind the knee.     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  He has a G-tube.  He has had a history of aspiration pneumonia in the past.  He reports increased cough over the last few days.  He has had increased sputum production as well.  He is not acutely short of breath.      The following portions of the patient's history were reviewed and updated as appropriate: allergies, current medications, past family history, past medical history, past social history, past surgical history and problem list.    Objective     Objective     Allergy:   No Known Allergies     Current Medications:   Current Outpatient Medications   Medication Sig Dispense Refill    albuterol (PROVENTIL) (2.5 MG/3ML) 0.083% nebulizer solution Take 2.5 mg by nebulization Every 4 (Four) Hours As Needed for Wheezing. 30 each 5    dextromethorphan polistirex ER (DELSYM) 30 MG/5ML Suspension Extended Release oral suspension 10 mL by Per PEG Tube route Every 12 (Twelve) Hours As Needed (cough). 280 mL 0    levothyroxine (SYNTHROID, LEVOTHROID)  75 MCG tablet Take 1 tablet by mouth Daily. 90 tablet 1    pseudoephedrine (SUDAFED) 30 MG tablet Take 1 tablet by mouth Every 8 (Eight) Hours As Needed for Congestion (cough). 30 tablet 0    sildenafil (VIAGRA) 100 MG tablet Take 1 tablet by mouth Daily As Needed for Erectile Dysfunction (Take 1/2 to 1 tablet as needed for ED). 30 tablet 3    sodium chloride 0.65 % nasal spray 2 sprays into the nostril(s) as directed by provider As Needed for Congestion. 15 mL 12    amoxicillin-clavulanate (Augmentin) 250-62.5 MG/5ML suspension Administer 10 mL per G tube 3 (Three) Times a Day for 10 days. 300 mL 0    predniSONE 5 MG/5ML solution Administer 20 mL per G tube 2 (Two) Times a Day for 5 days. 200 mL 0     No current facility-administered medications for this visit.       Past Medical History:  Past Medical History:   Diagnosis Date    Cancer     H/O colonoscopy     Hypothyroidism        Past Surgical History:  Past Surgical History:   Procedure Laterality Date    CATARACT EXTRACTION      COLONOSCOPY      LYMPH NODE DISSECTION      PEG TUBE INSERTION  2000    PEG TUBE REPLACEMENT  2016       Social History:  Social History     Socioeconomic History    Marital status:    Tobacco Use    Smoking status: Never    Smokeless tobacco: Never   Vaping Use    Vaping Use: Never used   Substance and Sexual Activity    Alcohol use: Not Currently     Comment: occasional    Drug use: Never    Sexual activity: Yes     Partners: Female       Family History:  Family History   Problem Relation Age of Onset    Cancer Mother     Lung cancer Father     Cancer Father     Melanoma Sister     No Known Problems Son     No Known Problems Maternal Grandmother     No Known Problems Maternal Grandfather     No Known Problems Paternal Grandmother     No Known Problems Paternal Grandfather          Vital Signs:   /60 (BP Location: Left arm, Patient Position: Sitting, Cuff Size: Adult)   Pulse 89   Temp 98.1 øF (36.7 øC) (Temporal)   Ht  "182.9 cm (72\")   Wt 69.9 kg (154 lb 3.2 oz)   SpO2 98%   BMI 20.91 kg/mý      Physical Exam:  Physical Exam  Constitutional:       Appearance: He is not ill-appearing.   Eyes:      Pupils: Pupils are equal, round, and reactive to light.   Cardiovascular:      Rate and Rhythm: Normal rate.      Pulses: Normal pulses.   Pulmonary:      Effort: Pulmonary effort is normal.      Breath sounds: Normal breath sounds. Examination of the left-upper field reveals rhonchi. Examination of the left-middle field reveals rhonchi.   Musculoskeletal:      Left knee: Crepitus present. Decreased range of motion. Tenderness present over the medial joint line.   Neurological:      General: No focal deficit present.      Mental Status: He is alert. Mental status is at baseline.             PHQ-9 Score  PHQ-9 Total Score: 0     Lab Review  No visits with results within 3 Month(s) from this visit.   Latest known visit with results is:   Lab on 01/15/2023   Component Date Value Ref Range Status    Glucose 01/15/2023 122 (H)  65 - 99 mg/dL Final    BUN 01/15/2023 26 (H)  8 - 23 mg/dL Final    Creatinine 01/15/2023 0.89  0.76 - 1.27 mg/dL Final    Sodium 01/15/2023 138  136 - 145 mmol/L Final    Potassium 01/15/2023 4.3  3.5 - 5.2 mmol/L Final    Chloride 01/15/2023 98  98 - 107 mmol/L Final    CO2 01/15/2023 35.0 (H)  22.0 - 29.0 mmol/L Final    Calcium 01/15/2023 9.3  8.6 - 10.5 mg/dL Final    Total Protein 01/15/2023 7.2  6.0 - 8.5 g/dL Final    Albumin 01/15/2023 3.4 (L)  3.5 - 5.2 g/dL Final    ALT (SGPT) 01/15/2023 14  1 - 41 U/L Final    AST (SGOT) 01/15/2023 20  1 - 40 U/L Final    Alkaline Phosphatase 01/15/2023 94  39 - 117 U/L Final    Total Bilirubin 01/15/2023 1.1  0.0 - 1.2 mg/dL Final    Globulin 01/15/2023 3.8  gm/dL Final    Calculated Result    A/G Ratio 01/15/2023 0.9  g/dL Final    BUN/Creatinine Ratio 01/15/2023 29.2 (H)  7.0 - 25.0 Final    Anion Gap 01/15/2023 5.0  5.0 - 15.0 mmol/L Final    eGFR 01/15/2023 92.2  " >60.0 mL/min/1.73 Final    National Kidney Foundation and American Society of Nephrology (ASN) Task Force recommended calculation based on the Chronic Kidney Disease Epidemiology Collaboration (CKD-EPI) equation refit without adjustment for race.    Sed Rate 01/15/2023 60 (H)  0 - 20 mm/hr Final    C-Reactive Protein 01/15/2023 3.48 (H)  0.00 - 0.50 mg/dL Final    Creatine Kinase 01/15/2023 41  20 - 200 U/L Final    WBC 01/15/2023 7.03  3.40 - 10.80 10*3/mm3 Final    RBC 01/15/2023 4.39  4.14 - 5.80 10*6/mm3 Final    Hemoglobin 01/15/2023 13.4  13.0 - 17.7 g/dL Final    Hematocrit 01/15/2023 43.0  37.5 - 51.0 % Final    MCV 01/15/2023 97.9 (H)  79.0 - 97.0 fL Final    MCH 01/15/2023 30.5  26.6 - 33.0 pg Final    MCHC 01/15/2023 31.2 (L)  31.5 - 35.7 g/dL Final    RDW 01/15/2023 13.7  12.3 - 15.4 % Final    RDW-SD 01/15/2023 49.4  37.0 - 54.0 fl Final    MPV 01/15/2023 10.2  6.0 - 12.0 fL Final    Platelets 01/15/2023 188  140 - 450 10*3/mm3 Final    Neutrophil % 01/15/2023 76.9 (H)  42.7 - 76.0 % Final    Lymphocyte % 01/15/2023 10.2 (L)  19.6 - 45.3 % Final    Monocyte % 01/15/2023 8.1  5.0 - 12.0 % Final    Eosinophil % 01/15/2023 4.4  0.3 - 6.2 % Final    Basophil % 01/15/2023 0.3  0.0 - 1.5 % Final    Immature Grans % 01/15/2023 0.1  0.0 - 0.5 % Final    Neutrophils, Absolute 01/15/2023 5.40  1.70 - 7.00 10*3/mm3 Final    Lymphocytes, Absolute 01/15/2023 0.72  0.70 - 3.10 10*3/mm3 Final    Monocytes, Absolute 01/15/2023 0.57  0.10 - 0.90 10*3/mm3 Final    Eosinophils, Absolute 01/15/2023 0.31  0.00 - 0.40 10*3/mm3 Final    Basophils, Absolute 01/15/2023 0.02  0.00 - 0.20 10*3/mm3 Final    Immature Grans, Absolute 01/15/2023 0.01  0.00 - 0.05 10*3/mm3 Final    nRBC 01/15/2023 0.0  0.0 - 0.2 /100 WBC Final        Radiology Results        Assessment / Plan         Assessment and Plan   Diagnoses and all orders for this visit:    1. Acute pain of left knee (Primary)  Assessment & Plan:  Acute pain of the left knee.   Most likely due to osteoarthritis/inflammation.  X-ray of the knee obtained in office today.  Once available I will give further recommendations.  Prescribing prednisone for concern for pneumonia and for anti-inflammatory effect.    Orders:  -     XR Knee 1 or 2 View Left (In Office)    2. Acute cough  -     XR Chest PA & Lateral (In Office)    3. Pneumonia of left lung due to infectious organism, unspecified part of lung  Assessment & Plan:  Given his history and exam findings there is concern for pneumonia.  I am treating with prednisone and Augmentin.  Chest x-ray to be obtained in office today.    Orders:  -     predniSONE 5 MG/5ML solution; Administer 20 mL per G tube 2 (Two) Times a Day for 5 days.  Dispense: 200 mL; Refill: 0  -     amoxicillin-clavulanate (Augmentin) 250-62.5 MG/5ML suspension; Administer 10 mL per G tube 3 (Three) Times a Day for 10 days.  Dispense: 300 mL; Refill: 0        Discussed possible differential diagnoses, testing, treatment, recommended non-pharmacological interventions, risks, warning signs to monitor for that would indicate need for follow-up in clinic or ER. If no improvement with these regimens or you have new or worsening symptoms follow-up. Patient verbalizes understanding and agreement with plan of care. Denies further needs or concerns.     Patient was given instructions and counseling regarding her condition and for health maintenance advised.    BMI is within normal parameters. No other follow-up for BMI required.         Health Maintenance  Health Maintenance:   Health Maintenance Due   Topic Date Due    TDAP/TD VACCINES (1 - Tdap) Never done    COVID-19 Vaccine (4 - Moderna series) 01/23/2022        Meds ordered during this visit  New Medications Ordered This Visit   Medications    predniSONE 5 MG/5ML solution     Sig: Administer 20 mL per G tube 2 (Two) Times a Day for 5 days.     Dispense:  200 mL     Refill:  0    amoxicillin-clavulanate (Augmentin) 250-62.5 MG/5ML  suspension     Sig: Administer 10 mL per G tube 3 (Three) Times a Day for 10 days.     Dispense:  300 mL     Refill:  0       Meds stopped during this visit:  There are no discontinued medications.     Visit Diagnoses    ICD-10-CM ICD-9-CM   1. Acute pain of left knee  M25.562 719.46   2. Acute cough  R05.1 786.2   3. Pneumonia of left lung due to infectious organism, unspecified part of lung  J18.9 486       Patient was given instructions and counseling regarding his condition or for health maintenance advice. Please see specific information pulled into the AVS if appropriate.     Follow Up   Return for Next scheduled follow up.      This document has been electronically signed by Aria Cordova DO   August 28, 2023 09:16 EDT    Dictated Utilizing Dragon Dictation: Part of this note may be an electronic transcription/translation of spoken language to printed text using the Dragon Dictation System.    Aria Cordova D.O.  [facility] [place]

## 2023-08-28 NOTE — ASSESSMENT & PLAN NOTE
Acute pain of the left knee.  Most likely due to osteoarthritis/inflammation.  X-ray of the knee obtained in office today.  Once available I will give further recommendations.  Prescribing prednisone for concern for pneumonia and for anti-inflammatory effect.

## 2023-08-29 DIAGNOSIS — M17.12 PRIMARY OSTEOARTHRITIS OF LEFT KNEE: Primary | ICD-10-CM

## 2023-09-01 ENCOUNTER — OFFICE VISIT (OUTPATIENT)
Dept: ORTHOPEDIC SURGERY | Facility: CLINIC | Age: 73
End: 2023-09-01
Payer: MEDICARE

## 2023-09-01 VITALS
SYSTOLIC BLOOD PRESSURE: 126 MMHG | DIASTOLIC BLOOD PRESSURE: 72 MMHG | HEIGHT: 71 IN | WEIGHT: 153.8 LBS | BODY MASS INDEX: 21.53 KG/M2

## 2023-09-01 DIAGNOSIS — M17.12 PRIMARY OSTEOARTHRITIS OF LEFT KNEE: ICD-10-CM

## 2023-09-01 DIAGNOSIS — M25.562 LEFT KNEE PAIN, UNSPECIFIED CHRONICITY: Primary | ICD-10-CM

## 2023-09-01 RX ORDER — TRIAMCINOLONE ACETONIDE 40 MG/ML
40 INJECTION, SUSPENSION INTRA-ARTICULAR; INTRAMUSCULAR
Status: COMPLETED | OUTPATIENT
Start: 2023-09-01 | End: 2023-09-01

## 2023-09-01 RX ORDER — LIDOCAINE HYDROCHLORIDE 10 MG/ML
4 INJECTION, SOLUTION EPIDURAL; INFILTRATION; INTRACAUDAL; PERINEURAL
Status: COMPLETED | OUTPATIENT
Start: 2023-09-01 | End: 2023-09-01

## 2023-09-01 RX ORDER — BUPIVACAINE HYDROCHLORIDE 5 MG/ML
4 INJECTION, SOLUTION EPIDURAL; INTRACAUDAL
Status: COMPLETED | OUTPATIENT
Start: 2023-09-01 | End: 2023-09-01

## 2023-09-01 RX ADMIN — BUPIVACAINE HYDROCHLORIDE 4 ML: 5 INJECTION, SOLUTION EPIDURAL; INTRACAUDAL at 09:11

## 2023-09-01 RX ADMIN — LIDOCAINE HYDROCHLORIDE 4 ML: 10 INJECTION, SOLUTION EPIDURAL; INFILTRATION; INTRACAUDAL; PERINEURAL at 09:11

## 2023-09-01 RX ADMIN — TRIAMCINOLONE ACETONIDE 40 MG: 40 INJECTION, SUSPENSION INTRA-ARTICULAR; INTRAMUSCULAR at 09:11

## 2023-09-01 NOTE — PROGRESS NOTES
Procedure   - Large Joint Arthrocentesis: L knee on 9/1/2023 9:11 AM  Indications: pain  Details: 21 G needle, anterolateral approach  Medications: 4 mL bupivacaine (PF) 0.5 %; 40 mg triamcinolone acetonide 40 MG/ML; 4 mL lidocaine PF 1% 1 %  Outcome: tolerated well, no immediate complications  Procedure, treatment alternatives, risks and benefits explained, specific risks discussed. Consent was given by the patient. Immediately prior to procedure a time out was called to verify the correct patient, procedure, equipment, support staff and site/side marked as required. Patient was prepped and draped in the usual sterile fashion.

## 2023-09-01 NOTE — PROGRESS NOTES
[facility] [place] [facility] [place]        Subjective     Pain of the Left Knee      HPI    Lucian Bermudez is a 73 y.o. male.  He is here today about left knee pain.  He has had it for 2 months.  He had a cortisone injection about 10 years ago that helped a lot.  I last saw him June 19, 2019 for his back.  I referred him to Dr. Griffiths.  No recent treatment for his knee.  He is retired.    Past Medical History:   Diagnosis Date    Cancer     H/O colonoscopy     Hypothyroidism       Past Surgical History:   Procedure Laterality Date    CATARACT EXTRACTION      COLONOSCOPY      LYMPH NODE DISSECTION      PEG TUBE INSERTION  2000    PEG TUBE REPLACEMENT  2016      Family History   Problem Relation Age of Onset    Cancer Mother     Lung cancer Father     Cancer Father     Melanoma Sister     No Known Problems Son     No Known Problems Maternal Grandmother     No Known Problems Maternal Grandfather     No Known Problems Paternal Grandmother     No Known Problems Paternal Grandfather      Social History     Socioeconomic History    Marital status:    Tobacco Use    Smoking status: Never    Smokeless tobacco: Never   Vaping Use    Vaping Use: Never used   Substance and Sexual Activity    Alcohol use: Not Currently     Comment: occasional    Drug use: Never    Sexual activity: Yes     Partners: Female      Current Outpatient Medications on File Prior to Visit   Medication Sig Dispense Refill    albuterol (PROVENTIL) (2.5 MG/3ML) 0.083% nebulizer solution Take 2.5 mg by nebulization Every 4 (Four) Hours As Needed for Wheezing. 30 each 5    amoxicillin-clavulanate (Augmentin) 250-62.5 MG/5ML suspension Administer 10 mL per G tube 3 (Three) Times a Day for 10 days. 300 mL 0    dextromethorphan polistirex ER (DELSYM) 30 MG/5ML Suspension Extended Release oral suspension 10 mL by Per PEG Tube route Every 12 (Twelve) Hours As Needed (cough). 280 mL 0    levothyroxine (SYNTHROID, LEVOTHROID) 75 MCG tablet Take 1 tablet  "by mouth Daily. 90 tablet 1    predniSONE 5 MG/5ML solution Administer 20 mL per G tube 2 (Two) Times a Day for 5 days. 200 mL 0    pseudoephedrine (SUDAFED) 30 MG tablet Take 1 tablet by mouth Every 8 (Eight) Hours As Needed for Congestion (cough). 30 tablet 0    sildenafil (VIAGRA) 100 MG tablet Take 1 tablet by mouth Daily As Needed for Erectile Dysfunction (Take 1/2 to 1 tablet as needed for ED). 30 tablet 3    sodium chloride 0.65 % nasal spray 2 sprays into the nostril(s) as directed by provider As Needed for Congestion. 15 mL 12     No current facility-administered medications on file prior to visit.      No Known Allergies       Review of Systems   Constitutional: Negative.    HENT: Negative.     Eyes: Negative.    Respiratory: Negative.     Cardiovascular: Negative.    Gastrointestinal: Negative.    Endocrine: Negative.    Genitourinary: Negative.    Musculoskeletal:  Positive for arthralgias.   Skin: Negative.    Allergic/Immunologic: Negative.    Neurological: Negative.    Hematological: Negative.    Psychiatric/Behavioral: Negative.        I reviewed the patient's chief complaint, history of present illness, review of systems, past medical history, surgical history, family history, social history, medications and allergy list.        Objective      Physical Exam  /72   Ht 180.3 cm (71\")   Wt 69.8 kg (153 lb 12.8 oz)   BMI 21.45 kg/mý     Body mass index is 21.45 kg/mý.    General  Mental Status - alert  General Appearance - cooperative, well groomed, not in acute distress  Orientation - Oriented X3  Build & Nutrition - well developed and well nourished  Posture - normal posture  Gait - normal gait       Ortho Exam  Left knee with crepitus.  No swelling no effusion.  Stable ligaments.  Range of motion 10 to 110 degrees    Imaging/Studies  Imaging Results (Last 24 Hours)       Procedure Component Value Units Date/Time    XR Knee 4+ View Left [[id number]] Resulted: 09/01/23 0904     Updated: " 09/01/23 0905    Narrative:      Knee X-Ray  Indication: Pain    Upright AP of bilateral knees. Lateral, skiers and Sunrise views of left   knee     Findings:  Bone-on-bone lateral patellofemoral joint with osteophytes in all 3   compartments  Questionable loose body in the asymptomatic right knee    No prior studies were available for comparison.              Assessment    Assessment:  1. Left knee pain, unspecified chronicity    2. Primary osteoarthritis of left knee        Plan:  Continue over-the-counter medication as needed for discomfort  I injected his left knee with cortisone.   I discussed with the patient the potential benefits of performing a therapeutic injection of the cortisone as well as potential risks including but not limited to infection, swelling, pain, bleeding, bruising, nerve/vessel damage, skin color changes, transient elevation in blood glucose levels, and fat atrophy. After informed consent and verifying correct patient, procedure site, and type of procedure, the area was prepped with Hibiclens, ethyl chloride was used to numb the skin. The patient tolerated the procedure well. There were no complications.        Neil Hyman MD  09/01/23  09:08 EDT      Dictated Utilizing Dragon Dictation.

## 2023-09-06 ENCOUNTER — OFFICE VISIT (OUTPATIENT)
Dept: ORTHOPEDIC SURGERY | Facility: CLINIC | Age: 73
End: 2023-09-06
Payer: MEDICARE

## 2023-09-06 VITALS
BODY MASS INDEX: 21.42 KG/M2 | HEIGHT: 71 IN | WEIGHT: 153 LBS | SYSTOLIC BLOOD PRESSURE: 120 MMHG | DIASTOLIC BLOOD PRESSURE: 70 MMHG

## 2023-09-06 DIAGNOSIS — M17.12 PRIMARY OSTEOARTHRITIS OF LEFT KNEE: Primary | ICD-10-CM

## 2023-09-06 DIAGNOSIS — M25.562 LEFT KNEE PAIN, UNSPECIFIED CHRONICITY: ICD-10-CM

## 2023-09-06 DIAGNOSIS — E44.0 MODERATE MALNUTRITION: ICD-10-CM

## 2023-09-06 DIAGNOSIS — R13.14 PHARYNGOESOPHAGEAL DYSPHAGIA: ICD-10-CM

## 2023-09-06 PROCEDURE — 3074F SYST BP LT 130 MM HG: CPT | Performed by: ORTHOPAEDIC SURGERY

## 2023-09-06 PROCEDURE — 99214 OFFICE O/P EST MOD 30 MIN: CPT | Performed by: ORTHOPAEDIC SURGERY

## 2023-09-06 PROCEDURE — 3078F DIAST BP <80 MM HG: CPT | Performed by: ORTHOPAEDIC SURGERY

## 2023-09-06 NOTE — PROGRESS NOTES
"    [facility] [place] [facility] [place]        Subjective     CC: Follow-up (5 day follow up - Left knee pain)      HPI    Lucian Bermudez is a 73 y.o. male.  He said the cortisone injection did not help.  He injection was last week on September 1.  It has not even been a weekend.    Overall, patient's symptoms are unchanged.  He has chronic medical malnutrition has a history of using a feeding tube.  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 He has a G-tube. He has had a history of aspiration pneumonia in the past. He reports increased cough over the last few days. He has had increased sputum production as well.     ROS:    Constiutional:Pt denies fever, chills, nausea, or vomiting.  MSK:as above        Objective      Past Medical History  Past Medical History:   Diagnosis Date   • Cancer    • H/O colonoscopy    • Hypothyroidism      Social History     Socioeconomic History   • Marital status:    Tobacco Use   • Smoking status: Never   • Smokeless tobacco: Never   Vaping Use   • Vaping Use: Never used   Substance and Sexual Activity   • Alcohol use: Not Currently     Comment: occasional   • Drug use: Never   • Sexual activity: Yes     Partners: Female          Physical Exam  /70   Ht 180.3 cm (70.98\")   Wt 69.4 kg (153 lb)   BMI 21.35 kg/m²     Body mass index is 21.35 kg/m².    Patient is well nourished and well developed.        Ortho Exam  No change in left knee exam.  No effusion.    Imaging/Labs/EMG Reviewed:  Imaging Results (Last 24 Hours)       ** No results found for the last 24 hours. **              Assessment    Assessment:  1. Primary osteoarthritis of left knee    2. Moderate malnutrition    3. Left knee pain, unspecified chronicity    4. Pharyngoesophageal dysphagia        Plan:  Recommend over the counter anti-inflammatories for pain and/or swelling  I have ordered Orthovisc for his left knee.  I have ordered physical " therapy for his left knee.  He is not a good candidate for knee replacement with his chronic malnutrition and history of cancer.  His dysphagia makes it difficult for him to speak  Pain management with genicular nerve block may be the next best option if Orthovisc does not work      Neil Hyman MD  09/06/23  10:00 EDT      Dictated Utilizing Dragon Dictation.

## 2023-09-11 ENCOUNTER — CLINICAL SUPPORT (OUTPATIENT)
Dept: ORTHOPEDIC SURGERY | Facility: CLINIC | Age: 73
End: 2023-09-11
Payer: MEDICARE

## 2023-09-11 ENCOUNTER — TELEPHONE (OUTPATIENT)
Dept: ORTHOPEDIC SURGERY | Facility: CLINIC | Age: 73
End: 2023-09-11

## 2023-09-11 DIAGNOSIS — M17.12 PRIMARY OSTEOARTHRITIS OF LEFT KNEE: Primary | ICD-10-CM

## 2023-09-11 NOTE — PROGRESS NOTES
Procedure   - Large Joint Arthrocentesis: L knee on 9/11/2023 3:00 PM  Indications: pain  Details: 21 G needle, anterolateral approach  Medications: 30 mg Hyaluronan 30 MG/2ML  Outcome: tolerated well, no immediate complications  Procedure, treatment alternatives, risks and benefits explained, specific risks discussed. Consent was given by the patient. Immediately prior to procedure a time out was called to verify the correct patient, procedure, equipment, support staff and site/side marked as required. Patient was prepped and draped in the usual sterile fashion.      I injected his left knee with Orthovisc.  He tolerated the injection well.

## 2023-09-11 NOTE — TELEPHONE ENCOUNTER
Caller: ILEANA     Relationship to patient: SPOUSE     Best call back number: [phone]    Patient is needing: PT SPOUSE CALLED IN STATING DR STANLEY MENTIONED PT HAVING GEL INJECTIONS FOR LEFT KNEE BUT THEY HAVE NOT HEARD ANYTHING AFTER THAT , SHE IS CALLING IN TO CHECK ON THAT. SHE IS ALSO WANTING TO KNOW HOW MUCH THAT ALL COSTS IF OFFICE OR PROVIDER IS AWARE OF THAT. PLEASE CALL BACK TO DISCUSS AND ADVISE , TY

## 2023-09-11 NOTE — TELEPHONE ENCOUNTER
CALLED PATIENT TO SCHEDULE INJECTIONS. NA, LVM. REQUESTED CALL BACK TO OFFICE TO SCHEDULE ORTHOVISC INJECTIONS.

## 2023-09-18 ENCOUNTER — CLINICAL SUPPORT (OUTPATIENT)
Dept: ORTHOPEDIC SURGERY | Facility: CLINIC | Age: 73
End: 2023-09-18
Payer: MEDICARE

## 2023-09-18 DIAGNOSIS — M17.12 PRIMARY OSTEOARTHRITIS OF LEFT KNEE: Primary | ICD-10-CM

## 2023-09-18 PROCEDURE — 20610 DRAIN/INJ JOINT/BURSA W/O US: CPT | Performed by: ORTHOPAEDIC SURGERY

## 2023-09-18 NOTE — PROGRESS NOTES
Procedure   - Large Joint Arthrocentesis: L knee on 9/18/2023 9:36 AM  Indications: pain  Details: 21 G needle, anterolateral approach  Medications: 30 mg Hyaluronan 30 MG/2ML  Outcome: tolerated well, no immediate complications  Procedure, treatment alternatives, risks and benefits explained, specific risks discussed. Consent was given by the patient. Immediately prior to procedure a time out was called to verify the correct patient, procedure, equipment, support staff and site/side marked as required. Patient was prepped and draped in the usual sterile fashion.      I injected his left knee with Orthovisc No. 2.  He said the first shot was already helping him.  He will follow-up next week for the third shot.

## 2023-09-20 RX ORDER — LEVOTHYROXINE SODIUM 0.07 MG/1
TABLET ORAL
Qty: 90 TABLET | Refills: 0 | Status: SHIPPED | OUTPATIENT
Start: 2023-09-20

## 2023-09-20 NOTE — TELEPHONE ENCOUNTER
"    Caller: Lucian Bermudez \"Maximo\"    Relationship: Self    Best call back number: [phone]    Requested Prescriptions:   Requested Prescriptions     Pending Prescriptions Disp Refills    levothyroxine (SYNTHROID, LEVOTHROID) 75 MCG tablet [Pharmacy Med Name: Levothyroxine Sodium 75 MCG Oral Tablet] 90 tablet 0     Sig: Take 1 tablet by mouth once daily        Pharmacy where request should be sent: [facility] [phone] [place], [place] [phone] FX     Last office visit with prescribing clinician: 8/28/2023   Last telemedicine visit with prescribing clinician: Visit date not found   Next office visit with prescribing clinician: Visit date not found     Additional details provided by patient:     Does the patient have less than a 3 day supply:  [x] Yes  [] No    Would you like a call back once the refill request has been completed: [x] Yes [] No    If the office needs to give you a call back, can they leave a voicemail: [x] Yes [] No    Stevo Yap Rep   09/20/23 09:57 EDT         "

## 2023-09-25 ENCOUNTER — CLINICAL SUPPORT (OUTPATIENT)
Dept: ORTHOPEDIC SURGERY | Facility: CLINIC | Age: 73
End: 2023-09-25

## 2023-09-25 DIAGNOSIS — M17.12 PRIMARY OSTEOARTHRITIS OF LEFT KNEE: Primary | ICD-10-CM

## 2023-09-25 NOTE — PROGRESS NOTES
Procedure   - Large Joint Arthrocentesis: L knee on 9/25/2023 9:20 AM  Indications: pain  Details: 21 G needle, anterolateral approach  Medications: 30 mg Hyaluronan 30 MG/2ML  Outcome: tolerated well, no immediate complications  Procedure, treatment alternatives, risks and benefits explained, specific risks discussed. Consent was given by the patient. Immediately prior to procedure a time out was called to verify the correct patient, procedure, equipment, support staff and site/side marked as required. Patient was prepped and draped in the usual sterile fashion.      I injected his left knee with Orthovisc.  The next option would be nerve block injections if this does not work.  Last resort is surgery.  He does not want surgery.

## 2023-12-19 RX ORDER — LEVOTHYROXINE SODIUM 0.07 MG/1
TABLET ORAL
Qty: 90 TABLET | Refills: 0 | Status: SHIPPED | OUTPATIENT
Start: 2023-12-19

## 2024-01-17 ENCOUNTER — TELEPHONE (OUTPATIENT)
Dept: FAMILY MEDICINE CLINIC | Facility: CLINIC | Age: 74
End: 2024-01-17
Payer: MEDICARE

## 2024-01-17 NOTE — TELEPHONE ENCOUNTER
Hub and front can relay      Tried to reach the pt to relay provider message:  Message below:  Patient will need to see a provider in [place]. If he does not have a doctor that he sees down there I would recommend he go to a [facility] [place].   JARETT Simmons MD

## 2024-01-17 NOTE — TELEPHONE ENCOUNTER
Caller: Caterina Bermudez    Relationship to patient: Emergency Contact    Best call back number: [phone]     Date of positive COVID19 test: 01-17-24    Date of possible COVID19 exposure: UNKNOWN    COVID19 symptoms: COUGH-CONGESTION-FEVER    Date of initial quarantine: 01-17-24    Additional information or concerns: SPOUSE CALLED IN REQUESTING THE MEDICATION FOR TREATMENT OF COVID-PATIENT IS CURRENTLY IN [place] WHERE HE TESTED POSITIVE FOR COVID WITH AN IN HOME TEST    What is the patients preferred pharmacy: [address] - [address] [place] [address] [phone] [facility] [phone]  [phone]

## 2024-01-17 NOTE — TELEPHONE ENCOUNTER
Name: Caterina Bermudez      Relationship: Emergency Contact      Best Callback Number: [phone]      HUB PROVIDED THE RELAY MESSAGE FROM THE OFFICE      PATIENT: VOICED UNDERSTANDING AND HAS NO FURTHER QUESTIONS AT THIS TIME

## 2024-05-28 DIAGNOSIS — N52.03 COMBINED ARTERIAL INSUFFICIENCY AND CORPORO-VENOUS OCCLUSIVE ERECTILE DYSFUNCTION: ICD-10-CM

## 2024-05-28 RX ORDER — SILDENAFIL 100 MG/1
TABLET, FILM COATED ORAL
Qty: 12 TABLET | Refills: 0 | Status: SHIPPED | OUTPATIENT
Start: 2024-05-28

## 2024-06-12 RX ORDER — LEVOTHYROXINE SODIUM 0.07 MG/1
TABLET ORAL
Qty: 90 TABLET | Refills: 0 | Status: SHIPPED | OUTPATIENT
Start: 2024-06-12

## 2024-06-12 NOTE — TELEPHONE ENCOUNTER
Rx Refill Note  Requested Prescriptions     Pending Prescriptions Disp Refills    levothyroxine (SYNTHROID, LEVOTHROID) 75 MCG tablet [Pharmacy Med Name: Levothyroxine Sodium 75 MCG Oral Tablet] 90 tablet 0     Sig: Take 1 tablet by mouth once daily      Last office visit with prescribing clinician: 12/16/2022   Last telemedicine visit with prescribing clinician: Visit date not found   Next office visit with prescribing clinician: 8/2/2024                         Would you like a call back once the refill request has been completed: [] Yes [] No    If the office needs to give you a call back, can they leave a voicemail: [] Yes [] No    Fay Long MA  06/12/24, 10:06 EDT

## 2024-07-15 DIAGNOSIS — N52.03 COMBINED ARTERIAL INSUFFICIENCY AND CORPORO-VENOUS OCCLUSIVE ERECTILE DYSFUNCTION: ICD-10-CM

## 2024-07-15 RX ORDER — SILDENAFIL 100 MG/1
TABLET, FILM COATED ORAL
Qty: 12 TABLET | Refills: 0 | Status: SHIPPED | OUTPATIENT
Start: 2024-07-15

## 2024-08-02 ENCOUNTER — LAB (OUTPATIENT)
Dept: LAB | Facility: HOSPITAL | Age: 74
End: 2024-08-02
Payer: MEDICARE

## 2024-08-02 ENCOUNTER — OFFICE VISIT (OUTPATIENT)
Dept: FAMILY MEDICINE CLINIC | Facility: CLINIC | Age: 74
End: 2024-08-02
Payer: MEDICARE

## 2024-08-02 VITALS
TEMPERATURE: 98.2 F | HEART RATE: 68 BPM | DIASTOLIC BLOOD PRESSURE: 66 MMHG | SYSTOLIC BLOOD PRESSURE: 110 MMHG | BODY MASS INDEX: 21.78 KG/M2 | WEIGHT: 155.6 LBS | HEIGHT: 71 IN

## 2024-08-02 DIAGNOSIS — Z12.5 SCREENING FOR PROSTATE CANCER: ICD-10-CM

## 2024-08-02 DIAGNOSIS — Z00.00 MEDICARE ANNUAL WELLNESS VISIT, SUBSEQUENT: Primary | ICD-10-CM

## 2024-08-02 DIAGNOSIS — N52.03 COMBINED ARTERIAL INSUFFICIENCY AND CORPORO-VENOUS OCCLUSIVE ERECTILE DYSFUNCTION: ICD-10-CM

## 2024-08-02 DIAGNOSIS — E03.9 HYPOTHYROIDISM, UNSPECIFIED TYPE: ICD-10-CM

## 2024-08-02 DIAGNOSIS — I10 PRIMARY HYPERTENSION: ICD-10-CM

## 2024-08-02 PROBLEM — U07.1 PNEUMONIA DUE TO COVID-19 VIRUS: Status: RESOLVED | Noted: 2024-01-22 | Resolved: 2024-08-02

## 2024-08-02 PROBLEM — L02.91 ABSCESS: Status: RESOLVED | Noted: 2022-12-16 | Resolved: 2024-08-02

## 2024-08-02 PROBLEM — J12.82 PNEUMONIA DUE TO COVID-19 VIRUS: Status: ACTIVE | Noted: 2024-01-22

## 2024-08-02 PROBLEM — I65.21 STENOSIS OF RIGHT CAROTID ARTERY: Status: ACTIVE | Noted: 2023-11-28

## 2024-08-02 PROBLEM — U07.1 PNEUMONIA DUE TO COVID-19 VIRUS: Status: ACTIVE | Noted: 2024-01-22

## 2024-08-02 PROBLEM — L03.221 CELLULITIS AND ABSCESS OF NECK: Status: RESOLVED | Noted: 2022-12-16 | Resolved: 2024-08-02

## 2024-08-02 PROBLEM — J18.9 PNEUMONIA OF LEFT LUNG DUE TO INFECTIOUS ORGANISM: Status: RESOLVED | Noted: 2023-08-28 | Resolved: 2024-08-02

## 2024-08-02 PROBLEM — L02.11 CELLULITIS AND ABSCESS OF NECK: Status: RESOLVED | Noted: 2022-12-16 | Resolved: 2024-08-02

## 2024-08-02 PROBLEM — M25.562 ACUTE PAIN OF LEFT KNEE: Status: RESOLVED | Noted: 2023-08-28 | Resolved: 2024-08-02

## 2024-08-02 PROBLEM — J12.82 PNEUMONIA DUE TO COVID-19 VIRUS: Status: RESOLVED | Noted: 2024-01-22 | Resolved: 2024-08-02

## 2024-08-02 LAB
ALBUMIN SERPL-MCNC: 3.9 G/DL (ref 3.5–5.2)
ALBUMIN/GLOB SERPL: 1.3 G/DL
ALP SERPL-CCNC: 116 U/L (ref 39–117)
ALT SERPL W P-5'-P-CCNC: 26 U/L (ref 1–41)
ANION GAP SERPL CALCULATED.3IONS-SCNC: 11.4 MMOL/L (ref 5–15)
AST SERPL-CCNC: 39 U/L (ref 1–40)
BASOPHILS # BLD AUTO: 0.03 10*3/MM3 (ref 0–0.2)
BASOPHILS NFR BLD AUTO: 0.4 % (ref 0–1.5)
BILIRUB SERPL-MCNC: 1.1 MG/DL (ref 0–1.2)
BUN SERPL-MCNC: 30 MG/DL (ref 8–23)
BUN/CREAT SERPL: 32.3 (ref 7–25)
CALCIUM SPEC-SCNC: 9.7 MG/DL (ref 8.6–10.5)
CHLORIDE SERPL-SCNC: 100 MMOL/L (ref 98–107)
CHOLEST SERPL-MCNC: 130 MG/DL (ref 0–200)
CO2 SERPL-SCNC: 27.6 MMOL/L (ref 22–29)
CREAT SERPL-MCNC: 0.93 MG/DL (ref 0.76–1.27)
DEPRECATED RDW RBC AUTO: 47.3 FL (ref 37–54)
EGFRCR SERPLBLD CKD-EPI 2021: 87.2 ML/MIN/1.73
EOSINOPHIL # BLD AUTO: 0.24 10*3/MM3 (ref 0–0.4)
EOSINOPHIL NFR BLD AUTO: 2.9 % (ref 0.3–6.2)
ERYTHROCYTE [DISTWIDTH] IN BLOOD BY AUTOMATED COUNT: 13.2 % (ref 12.3–15.4)
GLOBULIN UR ELPH-MCNC: 3.1 GM/DL
GLUCOSE SERPL-MCNC: 99 MG/DL (ref 65–99)
HCT VFR BLD AUTO: 45.7 % (ref 37.5–51)
HDLC SERPL-MCNC: 61 MG/DL (ref 40–60)
HGB BLD-MCNC: 14.3 G/DL (ref 13–17.7)
IMM GRANULOCYTES # BLD AUTO: 0.02 10*3/MM3 (ref 0–0.05)
IMM GRANULOCYTES NFR BLD AUTO: 0.2 % (ref 0–0.5)
LDLC SERPL CALC-MCNC: 53 MG/DL (ref 0–100)
LDLC/HDLC SERPL: 0.85 {RATIO}
LYMPHOCYTES # BLD AUTO: 1.07 10*3/MM3 (ref 0.7–3.1)
LYMPHOCYTES NFR BLD AUTO: 13.1 % (ref 19.6–45.3)
MCH RBC QN AUTO: 31 PG (ref 26.6–33)
MCHC RBC AUTO-ENTMCNC: 31.3 G/DL (ref 31.5–35.7)
MCV RBC AUTO: 99.1 FL (ref 79–97)
MONOCYTES # BLD AUTO: 0.58 10*3/MM3 (ref 0.1–0.9)
MONOCYTES NFR BLD AUTO: 7.1 % (ref 5–12)
NEUTROPHILS NFR BLD AUTO: 6.23 10*3/MM3 (ref 1.7–7)
NEUTROPHILS NFR BLD AUTO: 76.3 % (ref 42.7–76)
NRBC BLD AUTO-RTO: 0 /100 WBC (ref 0–0.2)
PLATELET # BLD AUTO: 193 10*3/MM3 (ref 140–450)
PMV BLD AUTO: 10.7 FL (ref 6–12)
POTASSIUM SERPL-SCNC: 4.2 MMOL/L (ref 3.5–5.2)
PROT SERPL-MCNC: 7 G/DL (ref 6–8.5)
PSA SERPL-MCNC: 3.64 NG/ML (ref 0–4)
RBC # BLD AUTO: 4.61 10*6/MM3 (ref 4.14–5.8)
SODIUM SERPL-SCNC: 139 MMOL/L (ref 136–145)
T4 FREE SERPL-MCNC: 1.05 NG/DL (ref 0.92–1.68)
TRIGL SERPL-MCNC: 85 MG/DL (ref 0–150)
TSH SERPL DL<=0.05 MIU/L-ACNC: 10.1 UIU/ML (ref 0.27–4.2)
VLDLC SERPL-MCNC: 16 MG/DL (ref 5–40)
WBC NRBC COR # BLD AUTO: 8.17 10*3/MM3 (ref 3.4–10.8)

## 2024-08-02 PROCEDURE — 85025 COMPLETE CBC W/AUTO DIFF WBC: CPT | Performed by: FAMILY MEDICINE

## 2024-08-02 PROCEDURE — 80061 LIPID PANEL: CPT | Performed by: FAMILY MEDICINE

## 2024-08-02 PROCEDURE — 80053 COMPREHEN METABOLIC PANEL: CPT | Performed by: FAMILY MEDICINE

## 2024-08-02 PROCEDURE — 1126F AMNT PAIN NOTED NONE PRSNT: CPT | Performed by: FAMILY MEDICINE

## 2024-08-02 PROCEDURE — 84443 ASSAY THYROID STIM HORMONE: CPT | Performed by: FAMILY MEDICINE

## 2024-08-02 PROCEDURE — G0103 PSA SCREENING: HCPCS | Performed by: FAMILY MEDICINE

## 2024-08-02 PROCEDURE — G0439 PPPS, SUBSEQ VISIT: HCPCS | Performed by: FAMILY MEDICINE

## 2024-08-02 PROCEDURE — 1160F RVW MEDS BY RX/DR IN RCRD: CPT | Performed by: FAMILY MEDICINE

## 2024-08-02 PROCEDURE — 1159F MED LIST DOCD IN RCRD: CPT | Performed by: FAMILY MEDICINE

## 2024-08-02 PROCEDURE — 84439 ASSAY OF FREE THYROXINE: CPT | Performed by: FAMILY MEDICINE

## 2024-08-02 PROCEDURE — 1170F FXNL STATUS ASSESSED: CPT | Performed by: FAMILY MEDICINE

## 2024-08-02 RX ORDER — AMOXICILLIN AND CLAVULANATE POTASSIUM 600; 42.9 MG/5ML; MG/5ML
POWDER, FOR SUSPENSION ORAL
COMMUNITY
End: 2024-08-02

## 2024-08-02 RX ORDER — NUT TX, LACT-REDUCED, IRON 0.09G-2.25
LIQUID (ML) ORAL
COMMUNITY
Start: 2024-04-29

## 2024-08-02 RX ORDER — ASPIRIN 81 MG/1
81 TABLET, CHEWABLE ORAL DAILY
COMMUNITY

## 2024-08-02 RX ORDER — CEPHALEXIN 250 MG/5ML
POWDER, FOR SUSPENSION ORAL
COMMUNITY
Start: 2024-07-18 | End: 2024-08-02

## 2024-08-02 RX ORDER — SILDENAFIL 100 MG/1
100 TABLET, FILM COATED ORAL AS NEEDED
Qty: 30 TABLET | Refills: 5 | Status: SHIPPED | OUTPATIENT
Start: 2024-08-02

## 2024-08-02 RX ORDER — LEVOTHYROXINE SODIUM 0.07 MG/1
75 TABLET ORAL DAILY
Qty: 90 TABLET | Refills: 3 | Status: SHIPPED | OUTPATIENT
Start: 2024-08-02

## 2024-08-02 NOTE — PATIENT INSTRUCTIONS
Advance Directive    Advance directives are legal documents that allow you to make decisions about your health care and medical treatment in case you become unable to communicate for yourself. Advance directives let your wishes be known to family, friends, and health care providers.  Discussing and writing advance directives should happen over time rather than all at once. Advance directives can be changed and updated at any time. There are different types of advance directives, such as:  Medical power of .  Living will.  Do not resuscitate (DNR) order or do not attempt resuscitation (DNAR) order.  Health care proxy and medical power of   A health care proxy is also called a health care agent. This person is appointed to make medical decisions for you when you are unable to make decisions for yourself. Generally, people ask a trusted friend or family member to act as their proxy and represent their preferences. Make sure you have an agreement with your trusted person to act as your proxy. A proxy may have to make a medical decision on your behalf if your wishes are not known.  A medical power of , also called a durable power of  for health care, is a legal document that names your health care proxy. Depending on the laws in your state, the document may need to be:  Signed.  Notarized.  Dated.  Copied.  Witnessed.  Incorporated into your medical record.  You may also want to appoint a trusted person to manage your money in the event you are unable to do so. This is called a durable power of  for finances. It is a separate legal document from the durable power of  for health care. You may choose your health care proxy or someone different to act as your agent in money matters.  If you do not appoint a proxy, or there is a concern that the proxy is not acting in your best interest, a court may appoint a guardian to act on your behalf.  Living will  A living will is a set  of instructions that state your wishes about medical care when you cannot express them yourself. Health care providers should keep a copy of your living will in your medical record. You may want to give a copy to family members or friends. To alert caregivers in case of an emergency, you can place a card in your wallet to let them know that you have a living will and where they can find it. A living will is used if you become:  Terminally ill.  Disabled.  Unable to communicate or make decisions.  The following decisions should be included in your living will:  To use or not to use life support equipment, such as dialysis machines and breathing machines (ventilators).  Whether you want a DNR or DNAR order. This tells health care providers not to use cardiopulmonary resuscitation (CPR) if breathing or heartbeat stops.  To use or not to use tube feeding.  To be given or not to be given food and fluids.  Whether you want comfort (palliative) care when the goal becomes comfort rather than a cure.  Whether you want to donate your organs and tissues.  A living will does not give instructions for distributing your money and property if you should pass away.  DNR or DNAR  A DNR or DNAR order is a request not to have CPR in the event that your heart stops beating or you stop breathing. If a DNR or DNAR order has not been made and shared, a health care provider will try to help any patient whose heart has stopped or who has stopped breathing. If you plan to have surgery, talk with your health care provider about how your DNR or DNAR order will be followed if problems occur.  What if I do not have an advance directive?  Some states assign family decision makers to act on your behalf if you do not have an advance directive. Each state has its own laws about advance directives. You may want to check with your health care provider, , or state representative about the laws in your state.  Summary  Advance directives are  legal documents that allow you to make decisions about your health care and medical treatment in case you become unable to communicate for yourself.  The process of discussing and writing advance directives should happen over time. You can change and update advance directives at any time.  Advance directives may include a medical power of , a living will, and a DNR or DNAR order.  This information is not intended to replace advice given to you by your health care provider. Make sure you discuss any questions you have with your health care provider.  Document Revised: 09/21/2021 Document Reviewed: 09/21/2021  [employer] Patient Education © 2024 [employer]    Advance Care Planning and Advance Directives     You make decisions on a daily basis - decisions about where you want to live, your career, your home, your life. Perhaps one of the most important decisions you face is your choice for future medical care. Take time to talk with your family and your healthcare team and start planning today.  Advance Care Planning is a process that can help you:  Understand possible future healthcare decisions in light of your own experiences  Reflect on those decision in light of your goals and values  Discuss your decisions with those closest to you and the healthcare professionals that care for you  Make a plan by creating a document that reflects your wishes    Surrogate Decision Maker  In the event of a medical emergency, which has left you unable to communicate or to make your own decisions, you would need someone to make decisions for you.  It is important to discuss your preferences for medical treatment with this person while you are in good health.     Qualities of a surrogate decision maker:  Willing to take on this role and responsibility  Knows what you want for future medical care  Willing to follow your wishes even if they don't agree with them  Able to make difficult medical decisions under stressful  circumstances    Advance Directives  These are legal documents you can create that will guide your healthcare team and decision maker(s) when needed. These documents can be stored in the electronic medical record.    Living Will - a legal document to guide your care if you have a terminal condition or a serious illness and are unable to communicate. States vary by statute in document names/types, but most forms may include one or more of the following:        -  Directions regarding life-prolonging treatments        -  Directions regarding artificially provided nutrition/hydration        -  Choosing a healthcare decision maker        -  Direction regarding organ/tissue donation    Durable Power of  for Healthcare - this document names an -in-fact to make medical decisions for you, but it may also allow this person to make personal and financial decisions for you. Please seek the advice of an  if you need this type of document.    **Advance Directives are not required and no one may discriminate against you if you do not sign one.    Medical Orders  Many states allow specific forms/orders signed by your physician to record your wishes for medical treatment in your current state of health. This form, signed in personal communication with your physician, addresses resuscitation and other medical interventions that you may or may not want.      For more information or to schedule a time with a [facility] Advance Care Planning Facilitator contact: [email or web address] or call [phone] and someone will contact you directly.  Medicare Wellness  Personal Prevention Plan of Service     Date of Office Visit:    Encounter Provider:  Wei Simmons MD  Place of Service:  [facility]  Patient Name: Lucian Bermudez  :  1950    As part of the Medicare Wellness portion of your visit today, we are providing you with this personalized preventive plan of  services (PPPS). This plan is based upon recommendations of the [place] Preventive Services Task Force (USPSTF) and the Advisory Committee on Immunization Practices (ACIP).    This lists the preventive care services that should be considered, and provides dates of when you are due. Items listed as completed are up-to-date and do not require any further intervention.    Health Maintenance   Topic Date Due    ANNUAL WELLNESS VISIT  12/16/2023    COVID-19 Vaccine (8 - 2023-24 season) 10/22/2024 (Originally 2/3/2024)    INFLUENZA VACCINE  03/31/2025 (Originally 8/1/2024)    HEPATITIS C SCREENING  08/02/2025 (Originally 10/10/2016)    COLORECTAL CANCER SCREENING  06/28/2027    TDAP/TD VACCINES (3 - Td or Tdap) 10/13/2031    Pneumococcal Vaccine 65+  Completed    ZOSTER VACCINE  Completed       Orders Placed This Encounter   Procedures    Comprehensive Metabolic Panel     Standing Status:   Future     Standing Expiration Date:   8/2/2025     Order Specific Question:   Release to patient     Answer:   Routine Release [[id number]]    TSH Rfx On Abnormal To Free T4     Standing Status:   Future     Standing Expiration Date:   8/2/2025     Order Specific Question:   Release to patient     Answer:   Routine Release [[id number]]    Lipid Panel     Standing Status:   Future     Standing Expiration Date:   8/2/2025     Order Specific Question:   Release to patient     Answer:   Routine Release [[id number]]    PSA Screen     Standing Status:   Future     Standing Expiration Date:   8/2/2025     Order Specific Question:   Release to patient     Answer:   Routine Release [[id number]]    CBC & Differential     Standing Status:   Future     Standing Expiration Date:   8/2/2025     Order Specific Question:   Manual Differential     Answer:   No     Order Specific Question:   Release to patient     Answer:   Routine Release [[id number]]       Return in about 1 year (around 8/2/2025) for Medicare Wellness.

## 2024-08-02 NOTE — PROGRESS NOTES
Subjective   The ABCs of the Annual Wellness Visit  Medicare Wellness Visit      Lucian Bermudez is a 74 y.o. patient who presents for a Medicare Wellness Visit.    The following portions of the patient's history were reviewed and   updated as appropriate: allergies, current medications, past family history, past medical history, past social history, past surgical history, and problem list.    Compared to one year ago, the patient's physical   health is better.  Compared to one year ago, the patient's mental   health is better.    Recent Hospitalizations:  He was admitted within the past 365 days at [facility] in [facility].     Current Medical Providers:  Patient Care Team:  Wei Simmons MD as PCP - General (Family Medicine)  Valentino, Joseph, MD as Consulting Physician (Otolaryngology)  Kilo Valle MD as Consulting Physician (Dermatology)  Jamey Swann MD as Consulting Physician (Orthopedic Surgery)  Manisha Funk MD as Consulting Physician (Dermatology)    Outpatient Medications Prior to Visit   Medication Sig Dispense Refill    aspirin 81 MG chewable tablet Chew 1 tablet Daily.      Nutritional Supplements (Boost Very High Calorie) liquid ADMINISTER THE CONTENTS OF 1 CARTON PER PEG TUBE 5 TIMES PER DAY      pseudoephedrine (SUDAFED) 30 MG tablet Take 1 tablet by mouth Every 8 (Eight) Hours As Needed for Congestion (cough). 30 tablet 0    levothyroxine (SYNTHROID, LEVOTHROID) 75 MCG tablet Take 1 tablet by mouth once daily 90 tablet 0    sildenafil (VIAGRA) 100 MG tablet TAKE 1/2 TO 1 TABLET BY MOUTH AS NEEDED FOR ERECTILE DYSFUNCTION 12 tablet 0    albuterol (PROVENTIL) (2.5 MG/3ML) 0.083% nebulizer solution Take 2.5 mg by nebulization Every 4 (Four) Hours As Needed for Wheezing. (Patient not taking: Reported on 8/2/2024) 30 each 5    amoxicillin-clavulanate (AUGMENTIN) 600-42.9 MG/5ML suspension TAKE 7.3 ML BY MOUTH TWICE DAILY FOR 10 DAYS (Patient not  taking: Reported on 8/2/2024)      cephALEXin (KEFLEX) 250 MG/5ML suspension TAKE 10 ML BY MOUTH EVERY 8 HOURS WITH MEALS FOR 5 DAYS (Patient not taking: Reported on 8/2/2024)      dextromethorphan polistirex ER (DELSYM) 30 MG/5ML Suspension Extended Release oral suspension 10 mL by Per PEG Tube route Every 12 (Twelve) Hours As Needed (cough). (Patient not taking: Reported on 8/2/2024) 280 mL 0    sodium chloride 0.65 % nasal spray 2 sprays into the nostril(s) as directed by provider As Needed for Congestion. (Patient not taking: Reported on 8/2/2024) 15 mL 12     No facility-administered medications prior to visit.     No opioid medication identified on active medication list. I have reviewed chart for other potential  high risk medication/s and harmful drug interactions in the elderly.      Aspirin is on active medication list. Aspirin use is indicated based on review of current medical condition/s. Pros and cons of this therapy have been discussed today. Benefits of this medication outweigh potential harm.  Patient has been encouraged to continue taking this medication.  .      Patient Active Problem List   Diagnosis    Hypothyroidism    Abnormal findings on diagnostic imaging of lung    Chronic bronchitis    Uses feeding tube    Hypothyroid    Parapneumonic effusion    Osteoarthritis of knee    HTN (hypertension)    History of throat cancer    Mediastinal adenopathy    Aspiration into lower respiratory tract    Inguinal hernia, left    Malignant neoplasm of oropharynx    Personal history of malignant neoplasm of unspecified site of lip, oral cavity, and pharynx    Personal history of other endocrine, nutritional and metabolic disease    Pharyngoesophageal dysphagia    Squamous cell carcinoma of base of tongue    Squamous cell carcinoma of skin of scalp    Status post inguinal hernia repair    Medicare annual wellness visit, subsequent    Lower urinary tract symptoms (LUTS)    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  ACP discussion was held with the patient during this visit. Patient has an advance directive (not in EMR), copy requested.        Objective   Vitals:    24 0844 24 0906   BP: 110/54 110/66   BP Location:  Left arm   Patient Position:  Sitting   Cuff Size:  Adult   Pulse: 68    Temp: 98.2 °F (36.8 °C)    TempSrc: Temporal    SpO2: Comment: couldnt read    Weight: 70.6 kg (155 lb 9.6 oz)    Height: 180.3 cm (71\")    PainSc: 0-No pain        Estimated body mass index is 21.7 kg/m² as calculated from the following:    Height as of this encounter: 180.3 cm (71\").    Weight as of this encounter: 70.6 kg (155 lb 9.6 oz).    BMI is within normal parameters. No other follow-up for BMI required.        Does the patient have evidence of cognitive impairment? No                                                                                               Health  Risk Assessment    Smoking Status:  Social History     Tobacco Use   Smoking Status Never   Smokeless Tobacco Never     Alcohol Consumption:  Social History     Substance and Sexual Activity   Alcohol Use Not Currently    Comment: occasional     Fall Risk Screen:  BOBBYADI Fall Risk Assessment was completed, and patient is at LOW risk for falls.Assessment completed on:2024    Depression Screenin/2/2024     8:42 AM   PHQ-2/PHQ-9 Depression Screening   Little Interest or Pleasure in Doing Things 0-->not at all   Feeling Down, Depressed or Hopeless 0-->not at all   PHQ-9: Brief Depression Severity Measure Score 0     Health Habits and Functional and Cognitive Screenin/2/2024     8:42 AM   Functional & Cognitive Status   Do " you have difficulty preparing food and eating? No   Do you have difficulty bathing yourself, getting dressed or grooming yourself? No   Do you have difficulty using the toilet? No   Do you have difficulty moving around from place to place? No   Do you have trouble with steps or getting out of a bed or a chair? No   Current Diet Other   Dental Exam Up to date   Eye Exam Up to date   Exercise (times per week) 3 times per week   Current Exercises Include Walking;Other   Do you need help using the phone?  No   Are you deaf or do you have serious difficulty hearing?  No   Do you need help to go to places out of walking distance? No   Do you need help shopping? No   Do you need help preparing meals?  No   Do you need help with housework?  No   Do you need help with laundry? No   Do you need help taking your medications? No   Do you need help managing money? No   Do you ever drive or ride in a car without wearing a seat belt? No   Have you felt unusual stress, anger or loneliness in the last month? No   Who do you live with? Spouse   If you need help, do you have trouble finding someone available to you? No   Have you been bothered in the last four weeks by sexual problems? No   Do you have difficulty concentrating, remembering or making decisions? No             Age-appropriate Screening Schedule:  Refer to the list below for future screening recommendations based on patient's age, sex and/or medical conditions. Orders for these recommended tests are listed in the plan section. The patient has been provided with a written plan.    Health Maintenance List  Health Maintenance   Topic Date Due    ANNUAL WELLNESS VISIT  12/16/2023    INFLUENZA VACCINE  08/01/2024    COVID-19 Vaccine (8 - 2023-24 season) 10/22/2024 (Originally 2/3/2024)    HEPATITIS C SCREENING  08/02/2025 (Originally 10/10/2016)    COLORECTAL CANCER SCREENING  06/28/2027    TDAP/TD VACCINES (3 - Td or Tdap) 10/13/2031    Pneumococcal Vaccine 65+  Completed     ZOSTER VACCINE  Completed                                                                                                                                                CMS Preventative Services Quick Reference  Risk Factors Identified During Encounter  Immunizations Discussed/Encouraged: Influenza    The above risks/problems have been discussed with the patient.  Pertinent information has been shared with the patient in the After Visit Summary.  An After Visit Summary and PPPS were made available to the patient.    Follow Up:   Next Medicare Wellness visit to be scheduled in 1 year.     Assessment & Plan  Medicare annual wellness visit, subsequent  The patient is here for health maintenance visit.  Currently, the patient consumes a healthy diet and has an adequate exercise regimen.  Screening lab work is ordered.  Immunizations were reviewed today.  Advice and education was given regarding nutrition, aerobic exercise, routine dental evaluations, routine eye exams, reproductive health, cardiovascular risk reduction, sunscreen use, self skin examination (annual dermatology evaluations) and seatbelt use (general overall safety).  Further recommendations will be given if needed after lab evaluation.  Annual wellness evaluation is recommended.    Primary hypertension    Hypothyroidism, unspecified type    Combined arterial insufficiency and corporo-venous occlusive erectile dysfunction    Screening for prostate cancer      Orders Placed This Encounter   Procedures    Comprehensive Metabolic Panel    TSH Rfx On Abnormal To Free T4    Lipid Panel    PSA Screen    CBC & Differential     New Medications Ordered This Visit   Medications    levothyroxine (SYNTHROID, LEVOTHROID) 75 MCG tablet     Sig: Take 1 tablet by mouth Daily.     Dispense:  90 tablet     Refill:  3    sildenafil (VIAGRA) 100 MG tablet     Sig: Take 1 tablet by mouth As Needed for Erectile Dysfunction.     Dispense:  30 tablet     Refill:  5           Follow Up:   No follow-ups on file.

## 2024-08-19 ENCOUNTER — OFFICE VISIT (OUTPATIENT)
Dept: FAMILY MEDICINE CLINIC | Facility: CLINIC | Age: 74
End: 2024-08-19
Payer: MEDICARE

## 2024-08-19 ENCOUNTER — APPOINTMENT (OUTPATIENT)
Dept: GENERAL RADIOLOGY | Facility: HOSPITAL | Age: 74
End: 2024-08-19
Payer: MEDICARE

## 2024-08-19 ENCOUNTER — HOSPITAL ENCOUNTER (EMERGENCY)
Facility: HOSPITAL | Age: 74
Discharge: HOME | End: 2024-08-19
Attending: EMERGENCY MEDICINE | Admitting: EMERGENCY MEDICINE
Payer: MEDICARE

## 2024-08-19 VITALS
HEART RATE: 84 BPM | OXYGEN SATURATION: 96 % | HEIGHT: 72 IN | BODY MASS INDEX: 20.72 KG/M2 | RESPIRATION RATE: 20 BRPM | TEMPERATURE: 97.5 F | DIASTOLIC BLOOD PRESSURE: 95 MMHG | SYSTOLIC BLOOD PRESSURE: 136 MMHG | WEIGHT: 153 LBS

## 2024-08-19 VITALS
HEIGHT: 71 IN | BODY MASS INDEX: 21.53 KG/M2 | OXYGEN SATURATION: 93 % | TEMPERATURE: 98.3 F | HEART RATE: 94 BPM | DIASTOLIC BLOOD PRESSURE: 56 MMHG | SYSTOLIC BLOOD PRESSURE: 88 MMHG | WEIGHT: 153.8 LBS

## 2024-08-19 DIAGNOSIS — E03.9 HYPOTHYROIDISM, UNSPECIFIED TYPE: ICD-10-CM

## 2024-08-19 DIAGNOSIS — R05.1 ACUTE COUGH: Primary | ICD-10-CM

## 2024-08-19 DIAGNOSIS — J18.9 PNEUMONIA OF RIGHT LOWER LOBE DUE TO INFECTIOUS ORGANISM: Primary | ICD-10-CM

## 2024-08-19 LAB
ALBUMIN SERPL-MCNC: 4 G/DL (ref 3.5–5.2)
ALBUMIN/GLOB SERPL: 1.2 G/DL
ALP SERPL-CCNC: 113 U/L (ref 39–117)
ALT SERPL W P-5'-P-CCNC: 19 U/L (ref 1–41)
ANION GAP SERPL CALCULATED.3IONS-SCNC: 8 MMOL/L (ref 5–15)
AST SERPL-CCNC: 25 U/L (ref 1–40)
BASOPHILS # BLD AUTO: 0.03 10*3/MM3 (ref 0–0.2)
BASOPHILS NFR BLD AUTO: 0.3 % (ref 0–1.5)
BILIRUB SERPL-MCNC: 2.1 MG/DL (ref 0–1.2)
BUN SERPL-MCNC: 29 MG/DL (ref 8–23)
BUN/CREAT SERPL: 35.4 (ref 7–25)
CALCIUM SPEC-SCNC: 9.3 MG/DL (ref 8.6–10.5)
CHLORIDE SERPL-SCNC: 97 MMOL/L (ref 98–107)
CO2 SERPL-SCNC: 35 MMOL/L (ref 22–29)
CREAT SERPL-MCNC: 0.82 MG/DL (ref 0.76–1.27)
D-LACTATE SERPL-SCNC: 0.8 MMOL/L (ref 0.5–2)
DEPRECATED RDW RBC AUTO: 49.1 FL (ref 37–54)
EGFRCR SERPLBLD CKD-EPI 2021: 93.3 ML/MIN/1.73
EOSINOPHIL # BLD AUTO: 0.24 10*3/MM3 (ref 0–0.4)
EOSINOPHIL NFR BLD AUTO: 2.6 % (ref 0.3–6.2)
ERYTHROCYTE [DISTWIDTH] IN BLOOD BY AUTOMATED COUNT: 13.3 % (ref 12.3–15.4)
EXPIRATION DATE: NORMAL
EXPIRATION DATE: NORMAL
FLUAV AG NPH QL: NEGATIVE
FLUAV RNA RESP QL NAA+PROBE: NOT DETECTED
FLUBV AG NPH QL: NEGATIVE
FLUBV RNA RESP QL NAA+PROBE: NOT DETECTED
GLOBULIN UR ELPH-MCNC: 3.4 GM/DL
GLUCOSE SERPL-MCNC: 76 MG/DL (ref 65–99)
HCT VFR BLD AUTO: 42.8 % (ref 37.5–51)
HGB BLD-MCNC: 13.6 G/DL (ref 13–17.7)
IMM GRANULOCYTES # BLD AUTO: 0.03 10*3/MM3 (ref 0–0.05)
IMM GRANULOCYTES NFR BLD AUTO: 0.3 % (ref 0–0.5)
INTERNAL CONTROL: NORMAL
INTERNAL CONTROL: NORMAL
LYMPHOCYTES # BLD AUTO: 1.11 10*3/MM3 (ref 0.7–3.1)
LYMPHOCYTES NFR BLD AUTO: 11.9 % (ref 19.6–45.3)
Lab: NORMAL
Lab: NORMAL
MCH RBC QN AUTO: 31.6 PG (ref 26.6–33)
MCHC RBC AUTO-ENTMCNC: 31.8 G/DL (ref 31.5–35.7)
MCV RBC AUTO: 99.3 FL (ref 79–97)
MONOCYTES # BLD AUTO: 0.58 10*3/MM3 (ref 0.1–0.9)
MONOCYTES NFR BLD AUTO: 6.2 % (ref 5–12)
NEUTROPHILS NFR BLD AUTO: 7.37 10*3/MM3 (ref 1.7–7)
NEUTROPHILS NFR BLD AUTO: 78.7 % (ref 42.7–76)
NRBC BLD AUTO-RTO: 0 /100 WBC (ref 0–0.2)
NT-PROBNP SERPL-MCNC: 382.3 PG/ML (ref 0–900)
PLATELET # BLD AUTO: 183 10*3/MM3 (ref 140–450)
PMV BLD AUTO: 10.5 FL (ref 6–12)
POTASSIUM SERPL-SCNC: 3.9 MMOL/L (ref 3.5–5.2)
PROT SERPL-MCNC: 7.4 G/DL (ref 6–8.5)
QT INTERVAL: 372 MS
QTC INTERVAL: 432 MS
RBC # BLD AUTO: 4.31 10*6/MM3 (ref 4.14–5.8)
SARS-COV-2 AG UPPER RESP QL IA.RAPID: NOT DETECTED
SARS-COV-2 RNA RESP QL NAA+PROBE: NOT DETECTED
SODIUM SERPL-SCNC: 140 MMOL/L (ref 136–145)
TROPONIN T SERPL HS-MCNC: 13 NG/L
WBC NRBC COR # BLD AUTO: 9.36 10*3/MM3 (ref 3.4–10.8)

## 2024-08-19 PROCEDURE — 1159F MED LIST DOCD IN RCRD: CPT | Performed by: FAMILY MEDICINE

## 2024-08-19 PROCEDURE — 99213 OFFICE O/P EST LOW 20 MIN: CPT | Performed by: FAMILY MEDICINE

## 2024-08-19 PROCEDURE — G2211 COMPLEX E/M VISIT ADD ON: HCPCS | Performed by: FAMILY MEDICINE

## 2024-08-19 PROCEDURE — 83605 ASSAY OF LACTIC ACID: CPT | Performed by: EMERGENCY MEDICINE

## 2024-08-19 PROCEDURE — 87426 SARSCOV CORONAVIRUS AG IA: CPT | Performed by: FAMILY MEDICINE

## 2024-08-19 PROCEDURE — 80053 COMPREHEN METABOLIC PANEL: CPT | Performed by: EMERGENCY MEDICINE

## 2024-08-19 PROCEDURE — 87636 SARSCOV2 & INF A&B AMP PRB: CPT | Performed by: EMERGENCY MEDICINE

## 2024-08-19 PROCEDURE — 99284 EMERGENCY DEPT VISIT MOD MDM: CPT

## 2024-08-19 PROCEDURE — 93005 ELECTROCARDIOGRAM TRACING: CPT | Performed by: EMERGENCY MEDICINE

## 2024-08-19 PROCEDURE — 83880 ASSAY OF NATRIURETIC PEPTIDE: CPT | Performed by: EMERGENCY MEDICINE

## 2024-08-19 PROCEDURE — 71045 X-RAY EXAM CHEST 1 VIEW: CPT

## 2024-08-19 PROCEDURE — 84484 ASSAY OF TROPONIN QUANT: CPT | Performed by: EMERGENCY MEDICINE

## 2024-08-19 PROCEDURE — 87804 INFLUENZA ASSAY W/OPTIC: CPT | Performed by: FAMILY MEDICINE

## 2024-08-19 PROCEDURE — 85025 COMPLETE CBC W/AUTO DIFF WBC: CPT | Performed by: EMERGENCY MEDICINE

## 2024-08-19 PROCEDURE — 36415 COLL VENOUS BLD VENIPUNCTURE: CPT

## 2024-08-19 PROCEDURE — 1126F AMNT PAIN NOTED NONE PRSNT: CPT | Performed by: FAMILY MEDICINE

## 2024-08-19 PROCEDURE — 1160F RVW MEDS BY RX/DR IN RCRD: CPT | Performed by: FAMILY MEDICINE

## 2024-08-19 RX ORDER — AMOXICILLIN AND CLAVULANATE POTASSIUM 600; 42.9 MG/5ML; MG/5ML
875 POWDER, FOR SUSPENSION ORAL 2 TIMES DAILY
Qty: 102.2 ML | Refills: 0 | Status: SHIPPED | OUTPATIENT
Start: 2024-08-19 | End: 2024-08-26

## 2024-08-19 RX ORDER — DOXYCYCLINE HYCLATE 100 MG/1
CAPSULE ORAL
COMMUNITY
Start: 2024-07-17 | End: 2024-08-19

## 2024-08-19 RX ORDER — AMOXICILLIN AND CLAVULANATE POTASSIUM 400; 57 MG/5ML; MG/5ML
875 POWDER, FOR SUSPENSION ORAL ONCE
Status: DISCONTINUED | OUTPATIENT
Start: 2024-08-19 | End: 2024-08-19

## 2024-08-19 RX ORDER — LEVOTHYROXINE SODIUM 88 UG/1
88 TABLET ORAL
Qty: 90 TABLET | Refills: 1 | Status: SHIPPED | OUTPATIENT
Start: 2024-08-19

## 2024-08-19 RX ORDER — SULFAMETHOXAZOLE AND TRIMETHOPRIM 200; 40 MG/5ML; MG/5ML
160 SUSPENSION ORAL ONCE
Status: DISCONTINUED | OUTPATIENT
Start: 2024-08-19 | End: 2024-08-19

## 2024-08-19 RX ORDER — SODIUM CHLORIDE 0.9 % (FLUSH) 0.9 %
10 SYRINGE (ML) INJECTION AS NEEDED
Status: DISCONTINUED | OUTPATIENT
Start: 2024-08-19 | End: 2024-08-19 | Stop reason: HOSPADM

## 2024-08-19 RX ORDER — AMOXICILLIN 400 MG/5ML
875 POWDER, FOR SUSPENSION ORAL ONCE
Status: DISCONTINUED | OUTPATIENT
Start: 2024-08-19 | End: 2024-08-19 | Stop reason: HOSPADM

## 2024-08-19 RX ORDER — CEPHALEXIN 250 MG/5ML
POWDER, FOR SUSPENSION ORAL
COMMUNITY
Start: 2024-07-18 | End: 2024-08-19

## 2024-08-19 NOTE — ASSESSMENT & PLAN NOTE
Acute cough which I suspect secondary to aspiration pneumonia.  Patient has been getting low oxygen levels at home reading in the upper 80s and low 90s.  We got 93% here today.  Patient's blood pressure typically runs low normal at home and when rechecked today was 88/56.  I have concerns the patient has aspiration pneumonia and has dehydrated and possibly becoming septic.  Patient instructed to go to the ED.  He declines an ambulance and states he will drive himself straight to the [facility] on [facility].

## 2024-08-19 NOTE — PROGRESS NOTES
Lucian Bermudez is a 74 y.o. male who presents today for Cough and Shortness of Breath      Patient states that he has had cough for the past 2 days. He has a feeding tube and sometimes vomits up tube feeds and has had aspiration PNA in the past. He is a little SOA when coughing. He has been checking oxygen at home and sees numbers in the upper 80s to low 90s. He does have some nasal congestion and sinus pressure. He denies fever, chills, CP, palpitations, HA, ear pain, sinus pain, urinary symptoms, diarrhea, and body aches. He has not been sleeping well the last two nights so he does feel a little tired. He does feel weak but denies dizziness and light headedness. Cough is non productive. He states the specialist he sees at  usually puts him on Augmentin for respiratory infections.         The following portions of the patient's history were reviewed and updated as appropriate: allergies, current medications, past family history, past medical history, past social history, past surgical history and problem list.    Current Outpatient Medications on File Prior to Visit   Medication Sig Dispense Refill    aspirin 81 MG chewable tablet Chew 1 tablet Daily.      Nutritional Supplements (Boost Very High Calorie) liquid ADMINISTER THE CONTENTS OF 1 CARTON PER PEG TUBE 5 TIMES PER DAY      pseudoephedrine (SUDAFED) 30 MG tablet Take 1 tablet by mouth Every 8 (Eight) Hours As Needed for Congestion (cough). 30 tablet 0    sildenafil (VIAGRA) 100 MG tablet Take 1 tablet by mouth As Needed for Erectile Dysfunction. 30 tablet 5    [DISCONTINUED] cephALEXin (KEFLEX) 250 MG/5ML suspension Take 10 mL every 8 hours by oral route with meal(s) for 5 days.      [DISCONTINUED] doxycycline (VIBRAMYCIN) 100 MG capsule Take 1 capsule twice a day by oral route with meal(s) for 5 days.      [DISCONTINUED] levothyroxine (SYNTHROID, LEVOTHROID) 75 MCG tablet Take 1 tablet by mouth Daily. 90 tablet 3     No current facility-administered  "medications on file prior to visit.       No Known Allergies     Visit Vitals  BP (!) 88/56 (BP Location: Left arm, Patient Position: Sitting, Cuff Size: Adult)   Pulse 94   Temp 98.3 °F (36.8 °C) (Temporal)   Ht 180.3 cm (71\")   Wt 69.8 kg (153 lb 12.8 oz)   SpO2 93%   BMI 21.45 kg/m²        Physical Exam  Constitutional:       General: He is not in acute distress.     Appearance: He is well-developed. He is not diaphoretic.   HENT:      Head: Atraumatic.   Cardiovascular:      Rate and Rhythm: Normal rate and regular rhythm.      Heart sounds: Normal heart sounds. No murmur heard.     No friction rub. No gallop.   Pulmonary:      Effort: Pulmonary effort is normal. No respiratory distress.      Breath sounds: No stridor. Wheezing and rhonchi (right lower lung field) present. No rales.   Musculoskeletal:      Cervical back: Normal range of motion and neck supple.   Skin:     General: Skin is warm and dry.   Neurological:      Mental Status: He is alert and oriented to person, place, and time.   Psychiatric:         Behavior: Behavior normal.          Results for orders placed or performed in visit on 08/19/24   POC Influenza A / B    Specimen: Swab   Result Value Ref Range    Rapid Influenza A Ag Negative Negative    Rapid Influenza B Ag Negative Negative    Internal Control Passed Passed    Lot Number [id number]     Expiration Date 12/13/2025    POCT SARS-CoV-2 Antigen    Specimen: Nasopharynx; Swab   Result Value Ref Range    SARS Antigen Not Detected Not Detected, Presumptive Negative    Internal Control Passed Passed    Lot Number [id number]     Expiration Date 04/28/2025         Problems Addressed this Visit          Endocrine and Metabolic    Hypothyroidism     On last labs patient had elevated TSH with low normal free T4.  Will increase Synthroid dose to 88 mcg every morning.  Patient follow-up in 8 weeks.         Relevant Medications    levothyroxine (SYNTHROID, LEVOTHROID) 88 MCG tablet       Other    Acute " cough - Primary     Acute cough which I suspect secondary to aspiration pneumonia.  Patient has been getting low oxygen levels at home reading in the upper 80s and low 90s.  We got 93% here today.  Patient's blood pressure typically runs low normal at home and when rechecked today was 88/56.  I have concerns the patient has aspiration pneumonia and has dehydrated and possibly becoming septic.  Patient instructed to go to the ED.  He declines an ambulance and states he will drive himself straight to the [facility] on [facility].         Relevant Orders    POC Influenza A / B (Completed)    POCT SARS-CoV-2 Antigen (Completed)     Diagnoses         Codes Comments    Acute cough    -  Primary ICD-10-CM: R05.1  ICD-9-CM: 786.2     Hypothyroidism, unspecified type     ICD-10-CM: E03.9  ICD-9-CM: 244.9                 Wei Simmons MD   8/19/2024

## 2024-08-19 NOTE — DISCHARGE INSTRUCTIONS
Take prescribed antibiotic Augmentin, this prescription was electronically sent to your pharmacy.  Follow-up with your primary care doctor.  Return to the ER as needed for new or worsening symptoms

## 2024-08-19 NOTE — ED PROVIDER NOTES
Subjective   History of Present Illness  Patient presents for evaluation of cough and difficulty breathing.  Patient states that on Saturday night he felt little bit queasy, nauseated, had 1 episode of vomiting and then afterwards developed a cough that has been persistent over the past 3 days.  He has not had chest pain or abdominal pain or ongoing nausea since that time.  He has a little bit of difficulty breathing.  He has not had fevers or chills.  He reports he has a history of pneumonias.  He went and saw his doctor in the clinic today who referred him to the ER due to concern for an aspiration pneumonia.  Patient states that he has been treated with Augmentin in the past which has been a good antibiotic when he develops similar episodes.    History provided by:  Patient      Review of Systems    Past Medical History:   Diagnosis Date    Cancer     H/O colonoscopy     Hypothyroidism        No Known Allergies    Past Surgical History:   Procedure Laterality Date    CATARACT EXTRACTION      COLONOSCOPY      LYMPH NODE DISSECTION      PEG TUBE INSERTION  2000    PEG TUBE REPLACEMENT  2016       Family History   Problem Relation Age of Onset    Cancer Mother     Lung cancer Father     Cancer Father     Melanoma Sister     No Known Problems Son     No Known Problems Maternal Grandmother     No Known Problems Maternal Grandfather     No Known Problems Paternal Grandmother     No Known Problems Paternal Grandfather        Social History     Socioeconomic History    Marital status:    Tobacco Use    Smoking status: Never    Smokeless tobacco: Never   Vaping Use    Vaping status: Never Used   Substance and Sexual Activity    Alcohol use: Not Currently     Comment: occasional    Drug use: Never    Sexual activity: Yes     Partners: Female           Objective   Physical Exam  Constitutional:       General: He is not in acute distress.  HENT:      Head: Normocephalic and atraumatic.   Eyes:      Conjunctiva/sclera:  Conjunctivae normal.      Pupils: Pupils are equal, round, and reactive to light.   Cardiovascular:      Rate and Rhythm: Normal rate and regular rhythm.      Pulses: Normal pulses.      Heart sounds: No murmur heard.     No gallop.   Pulmonary:      Effort: Pulmonary effort is normal. No respiratory distress.      Breath sounds: No wheezing or rales.   Abdominal:      General: Abdomen is flat. There is no distension.      Tenderness: There is no abdominal tenderness.   Musculoskeletal:         General: No swelling or deformity. Normal range of motion.      Right lower leg: No edema.      Left lower leg: No edema.   Skin:     General: Skin is warm and dry.      Capillary Refill: Capillary refill takes less than 2 seconds.   Neurological:      General: No focal deficit present.      Mental Status: He is alert and oriented to person, place, and time.   Psychiatric:         Mood and Affect: Mood normal.         Behavior: Behavior normal.         Procedures           ED Course  ED Course as of 08/19/24 1936   Mon Aug 19, 2024   1715 Twelve-lead ECG independently interpreted by myself demonstrates normal sinus rhythm, no ST segment elevation or depression.  Normal axis [KB]   1716 Chest x-ray independently interpreted by myself demonstrates abnormal pulmonary markings in the bilateral lower lung fields concerning for developing pneumonia [KB]   1934 Laboratory workup independently interpreted by myself demonstrates no substantial abnormality [KB]      ED Course User Index  [KB] Damon Barclay MD                                             Medical Decision Making  Differential diagnosis includes aspiration pneumonitis or pneumonia, community-acquired pneumonia, viral respiratory infection such as COVID or flu.  Pulmonary embolism is considered but felt to be less likely.  Patient overall well-appearing with normal vital signs, no signs of respiratory distress, no hypoxia.    On reassessment patient is clinically  unchanged, well-appearing.  Discussed diagnosis of pneumonia, plan for outpatient treatment with Augmentin.  Patient counseled strictly on return precautions and feels comfortable going home at this time.  Discharged in good condition    Problems Addressed:  Pneumonia of right lower lobe due to infectious organism: complicated acute illness or injury    Amount and/or Complexity of Data Reviewed  External Data Reviewed: notes.     Details: 8/19/2024 reviewed most recent primary care visit when patient was evaluated for cough and dyspnea and referred to the ER  Labs: ordered. Decision-making details documented in ED Course.  Radiology: ordered and independent interpretation performed. Decision-making details documented in ED Course.  ECG/medicine tests: ordered and independent interpretation performed. Decision-making details documented in ED Course.    Risk  OTC drugs.  Prescription drug management.  Decision regarding hospitalization.        Final diagnoses:   Pneumonia of right lower lobe due to infectious organism       ED Disposition  ED Disposition       ED Disposition   Discharge    Condition   Stable    Comment   --             Recent Results (from the past 24 hour(s))   POC Influenza A / B    Collection Time: 08/19/24  3:31 PM    Specimen: Swab   Result Value Ref Range    Rapid Influenza A Ag Negative Negative    Rapid Influenza B Ag Negative Negative    Internal Control Passed Passed    Lot Number [id number]     Expiration Date 12/13/2025    POCT SARS-CoV-2 Antigen    Collection Time: 08/19/24  3:33 PM    Specimen: Nasopharynx; Swab   Result Value Ref Range    SARS Antigen Not Detected Not Detected, Presumptive Negative    Internal Control Passed Passed    Lot Number [id number]     Expiration Date 04/28/2025    Comprehensive Metabolic Panel    Collection Time: 08/19/24  4:26 PM    Specimen: Blood   Result Value Ref Range    Glucose 76 65 - 99 mg/dL    BUN 29 (H) 8 - 23 mg/dL    Creatinine 0.82 0.76 - 1.27  mg/dL    Sodium 140 136 - 145 mmol/L    Potassium 3.9 3.5 - 5.2 mmol/L    Chloride 97 (L) 98 - 107 mmol/L    CO2 35.0 (H) 22.0 - 29.0 mmol/L    Calcium 9.3 8.6 - 10.5 mg/dL    Total Protein 7.4 6.0 - 8.5 g/dL    Albumin 4.0 3.5 - 5.2 g/dL    ALT (SGPT) 19 1 - 41 U/L    AST (SGOT) 25 1 - 40 U/L    Alkaline Phosphatase 113 39 - 117 U/L    Total Bilirubin 2.1 (H) 0.0 - 1.2 mg/dL    Globulin 3.4 gm/dL    A/G Ratio 1.2 g/dL    BUN/Creatinine Ratio 35.4 (H) 7.0 - 25.0    Anion Gap 8.0 5.0 - 15.0 mmol/L    eGFR 93.3 >60.0 mL/min/1.73   BNP    Collection Time: 08/19/24  4:26 PM    Specimen: Blood   Result Value Ref Range    proBNP 382.3 0.0 - 900.0 pg/mL   Single High Sensitivity Troponin T    Collection Time: 08/19/24  4:26 PM    Specimen: Blood   Result Value Ref Range    HS Troponin T 13 <22 ng/L   Lactic Acid, Plasma    Collection Time: 08/19/24  4:26 PM    Specimen: Blood   Result Value Ref Range    Lactate 0.8 0.5 - 2.0 mmol/L   CBC Auto Differential    Collection Time: 08/19/24  4:26 PM    Specimen: Blood   Result Value Ref Range    WBC 9.36 3.40 - 10.80 10*3/mm3    RBC 4.31 4.14 - 5.80 10*6/mm3    Hemoglobin 13.6 13.0 - 17.7 g/dL    Hematocrit 42.8 37.5 - 51.0 %    MCV 99.3 (H) 79.0 - 97.0 fL    MCH 31.6 26.6 - 33.0 pg    MCHC 31.8 31.5 - 35.7 g/dL    RDW 13.3 12.3 - 15.4 %    RDW-SD 49.1 37.0 - 54.0 fl    MPV 10.5 6.0 - 12.0 fL    Platelets 183 140 - 450 10*3/mm3    Neutrophil % 78.7 (H) 42.7 - 76.0 %    Lymphocyte % 11.9 (L) 19.6 - 45.3 %    Monocyte % 6.2 5.0 - 12.0 %    Eosinophil % 2.6 0.3 - 6.2 %    Basophil % 0.3 0.0 - 1.5 %    Immature Grans % 0.3 0.0 - 0.5 %    Neutrophils, Absolute 7.37 (H) 1.70 - 7.00 10*3/mm3    Lymphocytes, Absolute 1.11 0.70 - 3.10 10*3/mm3    Monocytes, Absolute 0.58 0.10 - 0.90 10*3/mm3    Eosinophils, Absolute 0.24 0.00 - 0.40 10*3/mm3    Basophils, Absolute 0.03 0.00 - 0.20 10*3/mm3    Immature Grans, Absolute 0.03 0.00 - 0.05 10*3/mm3    nRBC 0.0 0.0 - 0.2 /100 WBC   COVID-19 and  "FLU A/B PCR, 1 HR TAT - Swab, Nasopharynx    Collection Time: 08/19/24  4:31 PM    Specimen: Nasopharynx; Swab   Result Value Ref Range    COVID19 Not Detected Not Detected - Ref. Range    Influenza A PCR Not Detected Not Detected    Influenza B PCR Not Detected Not Detected   ECG 12 Lead Dyspnea    Collection Time: 08/19/24  4:48 PM   Result Value Ref Range    QT Interval 372 ms    QTC Interval 432 ms     Note: In addition to lab results from this visit, the labs listed above may include labs taken at another facility or during a different encounter within the last 24 hours. Please correlate lab times with ED admission and discharge times for further clarification of the services performed during this visit.    XR Chest 1 View   Final Result   Impression:   Mildly increased linear and airspace opacities in both lower lungs, concerning for atelectasis and possible pneumonia.         Electronically Signed: Annika Raza     8/19/2024 5:03 PM EDT     Workstation ID: [id number]        Vitals:    08/19/24 1616   BP: 136/95   Pulse: 84   Resp: 20   Temp: 97.5 °F (36.4 °C)   TempSrc: Oral   SpO2: 96%   Weight: 69.4 kg (153 lb)   Height: 182.9 cm (72\")     Medications   sodium chloride 0.9 % flush 10 mL (has no administration in time range)   amoxicillin (AMOXIL) 400 MG/5ML suspension 875 mg (has no administration in time range)     ECG/EMG Results (last 24 hours)       Procedure Component Value Units Date/Time    ECG 12 Lead Dyspnea [[id number]] Collected: 08/19/24 1648     Updated: 08/19/24 1728     QT Interval 372 ms      QTC Interval 432 ms     Narrative:      Test Reason : Dyspnea  Blood Pressure :   */*   mmHG  Vent. Rate :  81 BPM     Atrial Rate :  81 BPM     P-R Int : 158 ms          QRS Dur :  86 ms      QT Int : 372 ms       P-R-T Axes :  36 -15  34 degrees     QTc Int : 432 ms    Normal sinus rhythm  Minimal voltage criteria for LVH, may be normal variant  Cannot rule out Anterior infarct , age " undetermined  Abnormal ECG  When compared with ECG of 20-DEC-2022 06:22,  premature atrial complexes are no longer present  Confirmed by MD MCMILLAN KYLE ([id number]) on 8/19/2024 5:28:15 PM    Referred By: RADHA           Confirmed By: ELIOT MCMILLAN MD          ECG 12 Lead Dyspnea   Final Result   Test Reason : Dyspnea   Blood Pressure :   */*   mmHG   Vent. Rate :  81 BPM     Atrial Rate :  81 BPM      P-R Int : 158 ms          QRS Dur :  86 ms       QT Int : 372 ms       P-R-T Axes :  36 -15  34 degrees      QTc Int : 432 ms      Normal sinus rhythm   Minimal voltage criteria for LVH, may be normal variant   Cannot rule out Anterior infarct , age undetermined   Abnormal ECG   When compared with ECG of 20-DEC-2022 06:22,   premature atrial complexes are no longer present   Confirmed by MD MCMILLAN KYLE ([id number]) on 8/19/2024 5:28:15 PM      Referred By: RADHA           Confirmed By: ELIOT MCMILLAN MD              No follow-up provider specified.       Medication List        New Prescriptions      amoxicillin-clavulanate 600-42.9 MG/5ML suspension  Commonly known as: AUGMENTIN  Take 7.3 mL by mouth 2 (Two) Times a Day for 7 days.               Where to Get Your Medications        These medications were sent to [facility] [address] [place] [address] - [address] [place] [address] [phone]  - [phone] [address] [address]      Phone: [phone]   amoxicillin-clavulanate 600-42.9 MG/5ML suspension            Eliot Mcmillan MD  08/19/24 1936

## 2024-10-12 DIAGNOSIS — R05.1 ACUTE COUGH: Primary | ICD-10-CM

## 2024-10-12 RX ORDER — AMOXICILLIN AND CLAVULANATE POTASSIUM 600; 42.9 MG/5ML; MG/5ML
POWDER, FOR SUSPENSION ORAL
Qty: 150 ML | Refills: 0 | Status: SHIPPED | OUTPATIENT
Start: 2024-10-12

## 2024-10-12 RX ORDER — HYDROCODONE BITARTRATE AND HOMATROPINE METHYLBROMIDE ORAL SOLUTION 5; 1.5 MG/5ML; MG/5ML
5 LIQUID ORAL EVERY 6 HOURS PRN
Qty: 180 ML | Refills: 0 | Status: SHIPPED | OUTPATIENT
Start: 2024-10-12

## 2024-10-12 RX ORDER — HYDROCODONE POLISTIREX AND CHLORPHENIRAMINE POLISTIREX 10; 8 MG/5ML; MG/5ML
5 SUSPENSION, EXTENDED RELEASE ORAL EVERY 12 HOURS PRN
Qty: 160 ML | Refills: 0 | Status: SHIPPED | OUTPATIENT
Start: 2024-10-12

## 2024-10-12 RX ORDER — HYDROCODONE BITARTRATE AND HOMATROPINE METHYLBROMIDE ORAL SOLUTION 5; 1.5 MG/5ML; MG/5ML
5 LIQUID ORAL EVERY 6 HOURS PRN
Qty: 180 ML | Refills: 0 | Status: SHIPPED | OUTPATIENT
Start: 2024-10-12 | End: 2024-10-12 | Stop reason: SDUPTHER

## 2024-12-12 ENCOUNTER — TELEPHONE (OUTPATIENT)
Dept: FAMILY MEDICINE CLINIC | Facility: CLINIC | Age: 74
End: 2024-12-12

## 2024-12-12 DIAGNOSIS — R05.1 ACUTE COUGH: ICD-10-CM

## 2024-12-12 RX ORDER — HYDROCODONE POLISTIREX AND CHLORPHENIRAMINE POLISTIREX 10; 8 MG/5ML; MG/5ML
5 SUSPENSION, EXTENDED RELEASE ORAL EVERY 12 HOURS PRN
Qty: 160 ML | Refills: 0 | Status: SHIPPED | OUTPATIENT
Start: 2024-12-12 | End: 2024-12-15 | Stop reason: SDUPTHER

## 2024-12-12 RX ORDER — AMOXICILLIN AND CLAVULANATE POTASSIUM 600; 42.9 MG/5ML; MG/5ML
POWDER, FOR SUSPENSION ORAL
Qty: 150 ML | Refills: 0 | Status: SHIPPED | OUTPATIENT
Start: 2024-12-12

## 2024-12-12 RX ORDER — HYDROCODONE BITARTRATE AND HOMATROPINE METHYLBROMIDE ORAL SOLUTION 5; 1.5 MG/5ML; MG/5ML
5 LIQUID ORAL EVERY 6 HOURS PRN
Qty: 180 ML | Refills: 0 | Status: SHIPPED | OUTPATIENT
Start: 2024-12-12 | End: 2024-12-15 | Stop reason: SDUPTHER

## 2024-12-12 NOTE — TELEPHONE ENCOUNTER
Caller: [facility] [address] [place] [address] - [address] - [phone]  - [phone]    Relationship to patient: Pharmacy    Patient is needing:   HYDROcodone Bit-Homatrop MBr (HYCODAN) 5-1.5 MG/5ML solution     Hydrocod Ceasar-Chlorphe Ceasar ER (TUSSIONEX PENNKINETIC) 10-8 MG/5ML ER suspension       JESSICA DOES NOT CARRY THESE, COMPANY POLICY CAN NOT FILL THIS MEDICATION DUE TO PATIENT LIVING IN [place] AND MEDICATION BEING A CONTROLED SUBSTANCE. SUGGESTED A [facility] FOR THESE. SHOULD THEY STILL FILL AUGMENTIN. PLEASE ADVISE.

## 2024-12-15 DIAGNOSIS — R05.1 ACUTE COUGH: ICD-10-CM

## 2024-12-15 RX ORDER — HYDROCODONE POLISTIREX AND CHLORPHENIRAMINE POLISTIREX 10; 8 MG/5ML; MG/5ML
5 SUSPENSION, EXTENDED RELEASE ORAL EVERY 12 HOURS PRN
Qty: 160 ML | Refills: 0 | Status: SHIPPED | OUTPATIENT
Start: 2024-12-15 | End: 2024-12-16

## 2024-12-15 RX ORDER — HYDROCODONE BITARTRATE AND HOMATROPINE METHYLBROMIDE ORAL SOLUTION 5; 1.5 MG/5ML; MG/5ML
5 LIQUID ORAL EVERY 6 HOURS PRN
Qty: 180 ML | Refills: 0 | Status: SHIPPED | OUTPATIENT
Start: 2024-12-15

## 2024-12-16 ENCOUNTER — TELEPHONE (OUTPATIENT)
Dept: FAMILY MEDICINE CLINIC | Facility: CLINIC | Age: 74
End: 2024-12-16
Payer: MEDICARE

## 2024-12-16 DIAGNOSIS — R05.1 ACUTE COUGH: ICD-10-CM

## 2024-12-16 RX ORDER — DEXTROMETHORPHAN HYDROBROMIDE AND PROMETHAZINE HYDROCHLORIDE 15; 6.25 MG/5ML; MG/5ML
5 SYRUP ORAL 4 TIMES DAILY PRN
Qty: 180 ML | Refills: 1 | Status: SHIPPED | OUTPATIENT
Start: 2024-12-16

## 2024-12-16 RX ORDER — HYDROCODONE POLISTIREX AND CHLORPHENIRAMINE POLISTIREX 10; 8 MG/5ML; MG/5ML
5 SUSPENSION, EXTENDED RELEASE ORAL EVERY 12 HOURS PRN
Qty: 160 ML | Refills: 0 | Status: CANCELLED | OUTPATIENT
Start: 2024-12-16

## 2024-12-16 RX ORDER — HYDROCODONE BITARTRATE AND HOMATROPINE METHYLBROMIDE ORAL SOLUTION 5; 1.5 MG/5ML; MG/5ML
5 LIQUID ORAL EVERY 6 HOURS PRN
Qty: 180 ML | Refills: 0 | Status: CANCELLED | OUTPATIENT
Start: 2024-12-16

## 2024-12-16 NOTE — TELEPHONE ENCOUNTER
Caller: [facility] [address] [place] [address] - [address] [place] [address] - [phone]  - [phone]     Relationship: Pharmacy    Best call back number: [phone]     Requested Prescriptions:   Requested Prescriptions     Pending Prescriptions Disp Refills    HYDROcodone Bit-Homatrop MBr (HYCODAN) 5-1.5 MG/5ML solution 180 mL 0     Sig: Take 5 mL by mouth Every 6 (Six) Hours As Needed for Cough.    Hydrocod Ceasar-Chlorphe Ceasar ER (TUSSIONEX PENNKINETIC) 10-8 MG/5ML ER suspension 160 mL 0     Sig: Take 5 mL by mouth Every 12 (Twelve) Hours As Needed for Cough.          PHARMACY HAS NEITHER OF THESE MEDICATIONS AND WOULD LIKE TO  HAVE ANOTHER OPTION CALLED IN. PHARMACIST STATED THAT THIS IS UNCOMMON SO IT MIGHT BE HARD TO FIND THESE MEDICATIONS.

## 2025-02-02 DIAGNOSIS — E03.9 HYPOTHYROIDISM, UNSPECIFIED TYPE: ICD-10-CM

## 2025-02-03 RX ORDER — LEVOTHYROXINE SODIUM 88 UG/1
88 TABLET ORAL EVERY MORNING
Qty: 90 TABLET | Refills: 0 | Status: SHIPPED | OUTPATIENT
Start: 2025-02-03

## 2025-04-30 DIAGNOSIS — E03.9 HYPOTHYROIDISM, UNSPECIFIED TYPE: ICD-10-CM

## 2025-04-30 RX ORDER — LEVOTHYROXINE SODIUM 88 UG/1
88 TABLET ORAL EVERY MORNING
Qty: 90 TABLET | Refills: 0 | Status: SHIPPED | OUTPATIENT
Start: 2025-04-30

## 2025-07-29 DIAGNOSIS — E03.9 HYPOTHYROIDISM, UNSPECIFIED TYPE: ICD-10-CM

## 2025-07-29 RX ORDER — LEVOTHYROXINE SODIUM 88 UG/1
88 TABLET ORAL EVERY MORNING
Qty: 90 TABLET | Refills: 0 | Status: SHIPPED | OUTPATIENT
Start: 2025-07-29 | End: 2025-08-04 | Stop reason: SDUPTHER

## 2025-08-04 ENCOUNTER — OFFICE VISIT (OUTPATIENT)
Dept: FAMILY MEDICINE CLINIC | Facility: CLINIC | Age: 75
End: 2025-08-04
Payer: MEDICARE

## 2025-08-04 ENCOUNTER — LAB (OUTPATIENT)
Dept: LAB | Facility: HOSPITAL | Age: 75
End: 2025-08-04
Payer: MEDICARE

## 2025-08-04 VITALS
HEIGHT: 70 IN | BODY MASS INDEX: 20.7 KG/M2 | HEART RATE: 64 BPM | SYSTOLIC BLOOD PRESSURE: 110 MMHG | WEIGHT: 144.6 LBS | TEMPERATURE: 97.5 F | DIASTOLIC BLOOD PRESSURE: 74 MMHG

## 2025-08-04 DIAGNOSIS — Z00.00 MEDICARE ANNUAL WELLNESS VISIT, SUBSEQUENT: Primary | ICD-10-CM

## 2025-08-04 DIAGNOSIS — Z00.00 MEDICARE ANNUAL WELLNESS VISIT, SUBSEQUENT: ICD-10-CM

## 2025-08-04 DIAGNOSIS — E03.9 HYPOTHYROIDISM, UNSPECIFIED TYPE: ICD-10-CM

## 2025-08-04 DIAGNOSIS — Z12.5 SCREENING FOR PROSTATE CANCER: ICD-10-CM

## 2025-08-04 PROBLEM — R47.1 DYSARTHRIA: Status: ACTIVE | Noted: 2025-04-25

## 2025-08-04 LAB
ALBUMIN SERPL-MCNC: 3.6 G/DL (ref 3.5–5.2)
ALBUMIN/GLOB SERPL: 1 G/DL
ALP SERPL-CCNC: 103 U/L (ref 39–117)
ALT SERPL W P-5'-P-CCNC: 23 U/L (ref 1–41)
ANION GAP SERPL CALCULATED.3IONS-SCNC: 9 MMOL/L (ref 5–15)
AST SERPL-CCNC: 29 U/L (ref 1–40)
BASOPHILS # BLD AUTO: 0.01 10*3/MM3 (ref 0–0.2)
BASOPHILS NFR BLD AUTO: 0.1 % (ref 0–1.5)
BILIRUB SERPL-MCNC: 0.8 MG/DL (ref 0–1.2)
BUN SERPL-MCNC: 27 MG/DL (ref 8–23)
BUN/CREAT SERPL: 35.1 (ref 7–25)
CALCIUM SPEC-SCNC: 9.2 MG/DL (ref 8.6–10.5)
CHLORIDE SERPL-SCNC: 98 MMOL/L (ref 98–107)
CHOLEST SERPL-MCNC: 112 MG/DL (ref 0–200)
CO2 SERPL-SCNC: 32 MMOL/L (ref 22–29)
CREAT SERPL-MCNC: 0.77 MG/DL (ref 0.76–1.27)
DEPRECATED RDW RBC AUTO: 41.6 FL (ref 37–54)
EGFRCR SERPLBLD CKD-EPI 2021: 94.5 ML/MIN/1.73
EOSINOPHIL # BLD AUTO: 0.13 10*3/MM3 (ref 0–0.4)
EOSINOPHIL NFR BLD AUTO: 1.9 % (ref 0.3–6.2)
ERYTHROCYTE [DISTWIDTH] IN BLOOD BY AUTOMATED COUNT: 11.9 % (ref 12.3–15.4)
GLOBULIN UR ELPH-MCNC: 3.5 GM/DL
GLUCOSE SERPL-MCNC: 75 MG/DL (ref 65–99)
HCT VFR BLD AUTO: 40.2 % (ref 37.5–51)
HDLC SERPL-MCNC: 57 MG/DL (ref 40–60)
HGB BLD-MCNC: 13.4 G/DL (ref 13–17.7)
IMM GRANULOCYTES # BLD AUTO: 0.02 10*3/MM3 (ref 0–0.05)
IMM GRANULOCYTES NFR BLD AUTO: 0.3 % (ref 0–0.5)
LDLC SERPL CALC-MCNC: 41 MG/DL (ref 0–100)
LDLC/HDLC SERPL: 0.75 {RATIO}
LYMPHOCYTES # BLD AUTO: 0.91 10*3/MM3 (ref 0.7–3.1)
LYMPHOCYTES NFR BLD AUTO: 13.6 % (ref 19.6–45.3)
MCH RBC QN AUTO: 32.1 PG (ref 26.6–33)
MCHC RBC AUTO-ENTMCNC: 33.3 G/DL (ref 31.5–35.7)
MCV RBC AUTO: 96.2 FL (ref 79–97)
MONOCYTES # BLD AUTO: 0.55 10*3/MM3 (ref 0.1–0.9)
MONOCYTES NFR BLD AUTO: 8.2 % (ref 5–12)
NEUTROPHILS NFR BLD AUTO: 5.07 10*3/MM3 (ref 1.7–7)
NEUTROPHILS NFR BLD AUTO: 75.9 % (ref 42.7–76)
NRBC BLD AUTO-RTO: 0 /100 WBC (ref 0–0.2)
PLATELET # BLD AUTO: 217 10*3/MM3 (ref 140–450)
PMV BLD AUTO: 10.6 FL (ref 6–12)
POTASSIUM SERPL-SCNC: 4.1 MMOL/L (ref 3.5–5.2)
PROT SERPL-MCNC: 7.1 G/DL (ref 6–8.5)
PSA SERPL-MCNC: 3.07 NG/ML (ref 0–4)
RBC # BLD AUTO: 4.18 10*6/MM3 (ref 4.14–5.8)
SODIUM SERPL-SCNC: 139 MMOL/L (ref 136–145)
T4 FREE SERPL-MCNC: 1.07 NG/DL (ref 0.92–1.68)
TRIGL SERPL-MCNC: 62 MG/DL (ref 0–150)
TSH SERPL DL<=0.05 MIU/L-ACNC: 7.2 UIU/ML (ref 0.27–4.2)
VLDLC SERPL-MCNC: 14 MG/DL (ref 5–40)
WBC NRBC COR # BLD AUTO: 6.69 10*3/MM3 (ref 3.4–10.8)

## 2025-08-04 PROCEDURE — 80053 COMPREHEN METABOLIC PANEL: CPT

## 2025-08-04 PROCEDURE — G0103 PSA SCREENING: HCPCS

## 2025-08-04 PROCEDURE — 84443 ASSAY THYROID STIM HORMONE: CPT

## 2025-08-04 PROCEDURE — 84439 ASSAY OF FREE THYROXINE: CPT

## 2025-08-04 PROCEDURE — 1159F MED LIST DOCD IN RCRD: CPT | Performed by: FAMILY MEDICINE

## 2025-08-04 PROCEDURE — 1170F FXNL STATUS ASSESSED: CPT | Performed by: FAMILY MEDICINE

## 2025-08-04 PROCEDURE — G0439 PPPS, SUBSEQ VISIT: HCPCS | Performed by: FAMILY MEDICINE

## 2025-08-04 PROCEDURE — 85025 COMPLETE CBC W/AUTO DIFF WBC: CPT

## 2025-08-04 PROCEDURE — 1126F AMNT PAIN NOTED NONE PRSNT: CPT | Performed by: FAMILY MEDICINE

## 2025-08-04 PROCEDURE — 1160F RVW MEDS BY RX/DR IN RCRD: CPT | Performed by: FAMILY MEDICINE

## 2025-08-04 PROCEDURE — 80061 LIPID PANEL: CPT

## 2025-08-04 RX ORDER — LEVOTHYROXINE SODIUM 88 UG/1
88 TABLET ORAL EVERY MORNING
Qty: 90 TABLET | Refills: 3 | Status: SHIPPED | OUTPATIENT
Start: 2025-08-04

## 2025-08-04 RX ORDER — ALBUTEROL SULFATE 0.83 MG/ML
2.5 SOLUTION RESPIRATORY (INHALATION) EVERY 4 HOURS PRN
COMMUNITY
Start: 2025-02-14

## 2025-08-13 DIAGNOSIS — E03.9 HYPOTHYROIDISM, UNSPECIFIED TYPE: Primary | ICD-10-CM

## 2025-08-13 RX ORDER — LEVOTHYROXINE SODIUM 100 UG/1
100 TABLET ORAL
Qty: 30 TABLET | Refills: 5 | Status: SHIPPED | OUTPATIENT
Start: 2025-08-13